# Patient Record
Sex: FEMALE | Race: ASIAN | NOT HISPANIC OR LATINO | Employment: FULL TIME | URBAN - METROPOLITAN AREA
[De-identification: names, ages, dates, MRNs, and addresses within clinical notes are randomized per-mention and may not be internally consistent; named-entity substitution may affect disease eponyms.]

---

## 2017-03-15 ENCOUNTER — OFFICE VISIT (OUTPATIENT)
Dept: URGENT CARE | Facility: PHYSICIAN GROUP | Age: 67
End: 2017-03-15
Payer: COMMERCIAL

## 2017-03-15 VITALS
DIASTOLIC BLOOD PRESSURE: 74 MMHG | HEART RATE: 82 BPM | SYSTOLIC BLOOD PRESSURE: 118 MMHG | OXYGEN SATURATION: 93 % | HEIGHT: 61 IN | TEMPERATURE: 98.8 F | RESPIRATION RATE: 16 BRPM | BODY MASS INDEX: 28.7 KG/M2 | WEIGHT: 152 LBS

## 2017-03-15 DIAGNOSIS — J06.9 VIRAL UPPER RESPIRATORY ILLNESS: ICD-10-CM

## 2017-03-15 LAB
INT CON NEG: NEGATIVE
INT CON POS: POSITIVE
S PYO AG THROAT QL: NEGATIVE

## 2017-03-15 PROCEDURE — 87880 STREP A ASSAY W/OPTIC: CPT | Performed by: FAMILY MEDICINE

## 2017-03-15 PROCEDURE — 99214 OFFICE O/P EST MOD 30 MIN: CPT | Performed by: FAMILY MEDICINE

## 2017-03-15 RX ORDER — BENZONATATE 200 MG/1
200 CAPSULE ORAL 3 TIMES DAILY PRN
Qty: 40 CAP | Refills: 0 | Status: SHIPPED | OUTPATIENT
Start: 2017-03-15 | End: 2017-12-06

## 2017-03-15 ASSESSMENT — ENCOUNTER SYMPTOMS
FEVER: 0
NAUSEA: 1
VOMITING: 0
ABDOMINAL PAIN: 0
HOARSE VOICE: 1
CHILLS: 0
SHORTNESS OF BREATH: 0
COUGH: 1
NECK PAIN: 0
DIZZINESS: 0
HEADACHES: 1

## 2017-03-15 NOTE — PROGRESS NOTES
Subjective:      Sue Can is a 66 y.o. female who presents with Sore Throat            Pharyngitis   This is a new problem. The current episode started 1 to 4 weeks ago (1 week). The problem has been gradually worsening. Neither side of throat is experiencing more pain than the other. There has been no fever. The pain is at a severity of 5/10. The pain is moderate. Associated symptoms include congestion, coughing, headaches and a hoarse voice. Pertinent negatives include no abdominal pain, plugged ear sensation, neck pain, shortness of breath or vomiting. Treatments tried: nyquil, robitussin. The treatment provided mild relief.       Review of Systems   Constitutional: Negative for fever and chills.   HENT: Positive for congestion and hoarse voice.    Respiratory: Positive for cough. Negative for shortness of breath.    Cardiovascular: Negative for chest pain.   Gastrointestinal: Positive for nausea. Negative for vomiting and abdominal pain.   Musculoskeletal: Negative for neck pain.   Neurological: Positive for headaches. Negative for dizziness.     PMH:  has a past medical history of Hyperlipidemia; Hypertension; GOUT; and Microscopic hematuria. She also has no past medical history of Breast cancer (CMS-[facility]).  MEDS:   Current outpatient prescriptions:   •  Pseudoephedrine-DM-GG (ROBITUSSIN COLD & COUGH PO), Take  by mouth., Disp: , Rfl:   •  Pseudoeph-Doxylamine-DM-APAP (NYQUIL PO), Take  by mouth., Disp: , Rfl:   •  losartan (COZAAR) 50 MG Tab, TAKE ONE TABLET BY MOUTH ONCE DAILY, Disp: 90 Tab, Rfl: 3  •  metoprolol SR (TOPROL XL) 25 MG TABLET SR 24 HR, TAKE ONE TABLET BY MOUTH ONCE DAILY, Disp: 90 Tab, Rfl: 3  •  atorvastatin (LIPITOR) 40 MG Tab, Take 1 Tab by mouth every evening., Disp: 90 Tab, Rfl: 1  •  allopurinol (ZYLOPRIM) 300 MG Tab, TAKE ONE TABLET BY MOUTH ONCE DAILY, Disp: 90 Tab, Rfl: 1  •  naproxen (NAPROSYN) 500 MG TABS, Take 1 Tab by mouth 2 times a day, with meals., Disp: 60 Tab, Rfl: 3  •   vitamin D, Ergocalciferol, (DRISDOL) 70268 UNITS CAPS capsule, Take 1 Cap by mouth every 7 days., Disp: 4 Cap, Rfl: 2  •  simvastatin (ZOCOR) 40 MG Tab, TAKE ONE TABLET BY MOUTH ONCE DAILY, Disp: 90 Tab, Rfl: 1  •  propranolol LA (INDERAL LA) 60 MG CAPSULE SR 24 HR, TAKE ONE CAPSULE BY MOUTH ONCE DAILY, Disp: 90 Cap, Rfl: 1  •  guaifenesin-codeine (CHERATUSSIN AC) SOLN, Take 5 mL by mouth every 6 hours as needed for Cough., Disp: 240 mL, Rfl: 0  •  losartan (COZAAR) 25 MG TABS, Take 1 Tab by mouth every day., Disp: 30 Tab, Rfl: 5  •  azithromycin (ZITHROMAX) 250 MG TABS, Use ATTILA as directed, Disp: 6 Tab, Rfl: 0  •  benzonatate (TESSALON) 100 MG CAPS, Take 1 Cap by mouth 3 times a day as needed for Cough., Disp: 30 Cap, Rfl: 0  •  KLOR-CON M20 20 MEQ TBCR, TAKE ONE TABLET BY MOUTH ONCE DAILY, Disp: 30 Tab, Rfl: 0  •  furosemide (LASIX) 20 MG TABS, TAKE ONE TABLET BY MOUTH ONCE DAILY, Disp: 30 Tab, Rfl: 0  •  promethazine-codeine (PHENERGAN-CODEINE) 6.25-10 MG/5ML SYRP, Take 5 mL by mouth 4 times a day as needed for Cough., Disp: 120 mL, Rfl: 0  •  cefdinir (OMNICEF) 300 MG CAPS, Take 1 Cap by mouth 2 times a day., Disp: 20 Cap, Rfl: 0  •  benzonatate (TESSALON) 100 MG CAPS, Take 1 Cap by mouth 3 times a day as needed for Cough., Disp: 30 Cap, Rfl: 0  •  lisinopril (PRINIVIL) 10 MG TABS, Take 1 Tab by mouth every day., Disp: 90 Tab, Rfl: 1  ALLERGIES:   Allergies   Allergen Reactions   • Lisinopril      cough     SURGHX:   Past Surgical History   Procedure Laterality Date   • Appendectomy     • Abdominal hysterectomy total       fibroid tumor   • Colonoscopy with polyp  11/10     benign polyp - repeat in 2020     SOCHX:  reports that she has never smoked. She has never used smokeless tobacco. She reports that she drinks alcohol. She reports that she does not use illicit drugs.  FH: Family history was reviewed, no pertinent findings to report       Objective:     /74 mmHg  Pulse 82  Temp(Src) 37.1 °C (98.8 °F)  " Resp 16  Ht 1.562 m (5' 1.5\")  Wt 68.947 kg (152 lb)  BMI 28.26 kg/m2  SpO2 93%     Physical Exam   Constitutional: She appears well-developed.   HENT:   Head: Normocephalic.   Right Ear: External ear normal.   Left Ear: External ear normal.   Mouth/Throat: Oropharyngeal exudate present.   Nasal congestion   Eyes: Pupils are equal, round, and reactive to light. Right eye exhibits no discharge. Left eye exhibits no discharge.   Neck: Neck supple. No thyromegaly present.   Cardiovascular: Normal rate.  Exam reveals no friction rub.    No murmur heard.  Pulmonary/Chest: Effort normal. No respiratory distress. She has no wheezes.   Abdominal: Soft. She exhibits no distension. There is no tenderness. There is no guarding.   Lymphadenopathy:     She has no cervical adenopathy.   Neurological: She is alert.   Skin: Skin is warm and dry. No erythema.   Psychiatric: She has a normal mood and affect. Her behavior is normal.               Assessment/Plan:     1. Viral upper respiratory illness  benzonatate (TESSALON) 200 MG capsule    mag hydrox-al hydrox-simeth-diphenhydrAMINE-lidocaine viscous 2%     Supportive care  Push fluids  Monitor temperature  Follow-up if symptoms worsen or fail to improve    "

## 2017-03-15 NOTE — MR AVS SNAPSHOT
"        Sue Can   3/15/2017 12:00 PM   Office Visit   MRN: [id number]    Department:  [facility]   Dept Phone:  [phone]    Description:  Female : 1950   Provider:  Hira Alvares M.D.           Reason for Visit     Sore Throat C/o cough, sore throat, runny nose x1 week      Allergies as of 3/15/2017     Allergen Noted Reactions    Lisinopril 2014       cough      You were diagnosed with     Viral upper respiratory illness   [[id number]]         Vital Signs     Blood Pressure Pulse Temperature Respirations Height Weight    118/74 mmHg 82 37.1 °C (98.8 °F) 16 1.562 m (5' 1.5\") 68.947 kg (152 lb)    Body Mass Index Oxygen Saturation Smoking Status             28.26 kg/m2 93% Never Smoker          Basic Information     Date Of Birth Sex Race Ethnicity Preferred Language    1950 Female  Non- English      Your appointments     2017  7:00 AM   Established Patient with Keeley Woodward M.D.   [facility] (--)    [address]  [address]  [facility] [id number] [phone]           You will be receiving a confirmation call a few days before your appointment from our automated call confirmation system.              Problem List              ICD-10-CM Priority Class Noted - Resolved    Gout M10.9   Unknown - Present    HTN (hypertension) I10   2013 - Present    Impaired fasting blood sugar R73.01   10/29/2013 - Present    Dyslipidemia E78.5   10/29/2013 - Present    Low TSH level R94.6   10/29/2013 - Present    Gout, arthropathy M10.9   10/29/2013 - Present    Type 2 diabetes mellitus without complication (CMS-HCC) E11.9   2015 - Present    Essential hypertension I10   2015 - Present      Health Maintenance        Date Due Completion Dates    DIABETES MONOFILAMENT / LE EXAM 1951 ---    IMM DTaP/Tdap/Td Vaccine (1 - Tdap) 1971 ---    RETINAL SCREENING 2015 (Done)    Override on 2014: Done    A1C SCREENING 2017 " 12/5/2016, 12/5/2016, 6/22/2016, 12/2/2015, 7/20/2015 (Done), 11/21/2014 (Done), 10/26/2013    Override on 7/20/2015: Done    Override on 11/21/2014: Done    FASTING LIPID PROFILE 12/5/2017 12/5/2016, 6/22/2016, 12/2/2015, 7/20/2015 (Done), 11/21/2014 (Done), 10/26/2013, 11/21/2011 (Done)    Override on 7/20/2015: Done    Override on 11/21/2014: Done    Override on 11/21/2011: Done    URINE ACR / MICROALBUMIN 12/5/2017 12/5/2016, 12/2/2015, 11/21/2014 (Done)    Override on 11/21/2014: Done    SERUM CREATININE 12/5/2017 12/5/2016, 6/22/2016, 12/2/2015, 7/20/2015 (Done), 11/21/2014 (Done), 10/26/2013    Override on 7/20/2015: Done    Override on 11/21/2014: Done    MAMMOGRAM 12/20/2017 12/20/2016, 11/25/2015, 10/31/2013, 10/30/2013, 2/9/2010 (Done)    Override on 2/9/2010: Done ([facility])    COLONOSCOPY 10/8/2022 10/8/2012 (Done)    Override on 10/8/2012: Done            Results     POCT Rapid Strep A      Component    Rapid Strep Screen    Negative    Internal Control Positive    Positive    Internal Control Negative    Negative                        Current Immunizations     13-VALENT PCV PREVNAR 12/14/2016    Influenza TIV (IM) 10/29/2013  4:36 PM    Influenza Vaccine Quad Inj (Pf) 12/2/2015    Influenza Vaccine Quad Inj (Preserved) 11/14/2016    Pneumococcal polysaccharide vaccine (PPSV-23) 8/11/2015    SHINGLES VACCINE 8/11/2015      Below and/or attached are the medications your provider expects you to take. Review all of your home medications and newly ordered medications with your provider and/or pharmacist. Follow medication instructions as directed by your provider and/or pharmacist. Please keep your medication list with you and share with your provider. Update the information when medications are discontinued, doses are changed, or new medications (including over-the-counter products) are added; and carry medication information at all times in the event of emergency situations     Allergies:   LISINOPRIL - (reactions not documented)               Medications  Valid as of: March 15, 2017 -  2:41 PM    Generic Name Brand Name Tablet Size Instructions for use    Allopurinol (Tab) ZYLOPRIM 300 MG TAKE ONE TABLET BY MOUTH ONCE DAILY        Atorvastatin Calcium (Tab) LIPITOR 40 MG Take 1 Tab by mouth every evening.        Azithromycin (Tab) ZITHROMAX 250 MG Use ATTILA as directed        Benzonatate (Cap) TESSALON 100 MG Take 1 Cap by mouth 3 times a day as needed for Cough.        Benzonatate (Cap) TESSALON 100 MG Take 1 Cap by mouth 3 times a day as needed for Cough.        Benzonatate (Cap) TESSALON 200 MG Take 1 Cap by mouth 3 times a day as needed for Cough.        Cefdinir (Cap) OMNICEF 300 MG Take 1 Cap by mouth 2 times a day.        Ergocalciferol (Cap) DRISDOL 80527 UNITS Take 1 Cap by mouth every 7 days.        Furosemide (Tab) LASIX 20 MG TAKE ONE TABLET BY MOUTH ONCE DAILY        Guaifenesin-Codeine (Solution) ROBITUSSIN -10 mg/5mL Take 5 mL by mouth every 6 hours as needed for Cough.        Lisinopril (Tab) PRINIVIL 10 MG Take 1 Tab by mouth every day.        Losartan Potassium (Tab) COZAAR 25 MG Take 1 Tab by mouth every day.        Losartan Potassium (Tab) COZAAR 50 MG TAKE ONE TABLET BY MOUTH ONCE DAILY        mag hydrox-al hydrox-simeth-diphenhydrAMINE-lidocaine viscous 2%   Take 30 mL by mouth 4 times a day as needed for up to 4 days.        Metoprolol Succinate (TABLET SR 24 HR) TOPROL XL 25 MG TAKE ONE TABLET BY MOUTH ONCE DAILY        Naproxen (Tab) NAPROSYN 500 MG Take 1 Tab by mouth 2 times a day, with meals.        Potassium Chloride Shanae CR (Tab CR) KLOR-CON M20 20 MEQ TAKE ONE TABLET BY MOUTH ONCE DAILY        Promethazine-Codeine (Syrup) PHENERGAN-CODEINE 6.25-10 MG/5ML Take 5 mL by mouth 4 times a day as needed for Cough.        Propranolol HCl (CAPSULE SR 24 HR) INDERAL LA 60 MG TAKE ONE CAPSULE BY MOUTH ONCE DAILY        Pseudoeph-Doxylamine-DM-APAP   Take  by mouth.         Pseudoephedrine-DM-GG   Take  by mouth.        Simvastatin (Tab) ZOCOR 40 MG TAKE ONE TABLET BY MOUTH ONCE DAILY        .                 Medicines prescribed today were sent to:     [facility] [address] [place] [address] - [address] [place] [address]    Phone: [phone] Fax: [phone]    Open 24 Hours?: No      Medication refill instructions:       If your prescription bottle indicates you have medication refills left, it is not necessary to call your provider’s office. Please contact your pharmacy and they will refill your medication.    If your prescription bottle indicates you do not have any refills left, you may request refills at any time through one of the following ways: The online [employer]ank system (except Urgent Care), by calling your provider’s office, or by asking your pharmacy to contact your provider’s office with a refill request. Medication refills are processed only during regular business hours and may not be available until the next business day. Your provider may request additional information or to have a follow-up visit with you prior to refilling your medication.   *Please Note: Medication refills are assigned a new Rx number when refilled electronically. Your pharmacy may indicate that no refills were authorized even though a new prescription for the same medication is available at the pharmacy. Please request the medicine by name with the pharmacy before contacting your provider for a refill.           Futubank Access Code: [email or web address]  Expires: 4/14/2017  2:41 PM    [employer]ank  A secure, online tool to manage your health information     [employer] Fut[employer]ank® is a secure, online tool that connects you to your personalized health information from the privacy of your home -- day or night - making it very easy for you to manage your healthcare. Once the activation process is completed, you can even access your medical information using the Futubank josh,  which is available for free in the Apple Serg store or Google Play store.     Tagwhat provides the following levels of access (as shown below):   My Chart Features   [facility] Primary [facility] Doctor [facility]  Specialists [facility]  Urgent  Care Non-[facility]  Primary Care  Doctor   Email your healthcare team securely and privately 24/7 X X X    Manage appointments: schedule your next appointment; view details of past/upcoming appointments X      Request prescription refills. X      View recent personal medical records, including lab and immunizations X X X X   View health record, including health history, allergies, medications X X X X   Read reports about your outpatient visits, procedures, consult and ER notes X X X X   See your discharge summary, which is a recap of your hospital and/or ER visit that includes your diagnosis, lab results, and care plan. X X       How to register for Tagwhat:  1. Go to  [email or web address].  2. Click on the Sign Up Now box, which takes you to the New Member Sign Up page. You will need to provide the following information:  a. Enter your Tagwhat Access Code exactly as it appears at the top of this page. (You will not need to use this code after you’ve completed the sign-up process. If you do not sign up before the expiration date, you must request a new code.)   b. Enter your date of birth.   c. Enter your home email address.   d. Click Submit, and follow the next screen’s instructions.  3. Create a Tagwhat ID. This will be your Tagwhat login ID and cannot be changed, so think of one that is secure and easy to remember.  4. Create a Tagwhat password. You can change your password at any time.  5. Enter your Password Reset Question and Answer. This can be used at a later time if you forget your password.   6. Enter your e-mail address. This allows you to receive e-mail notifications when new information is available in Tagwhat.  7. Click Sign Up. You can now view your health  information.    For assistance activating your [employer] account, call [phone]

## 2017-05-24 ENCOUNTER — HOSPITAL ENCOUNTER (OUTPATIENT)
Dept: LAB | Facility: MEDICAL CENTER | Age: 67
End: 2017-05-24
Attending: FAMILY MEDICINE
Payer: COMMERCIAL

## 2017-05-24 DIAGNOSIS — I10 ESSENTIAL HYPERTENSION: ICD-10-CM

## 2017-05-24 DIAGNOSIS — E11.9 TYPE 2 DIABETES MELLITUS WITHOUT COMPLICATION (HCC): ICD-10-CM

## 2017-05-24 DIAGNOSIS — R31.29 MICROSCOPIC HEMATURIA: ICD-10-CM

## 2017-05-24 DIAGNOSIS — E78.5 DYSLIPIDEMIA: ICD-10-CM

## 2017-05-24 DIAGNOSIS — M10.9 GOUT: ICD-10-CM

## 2017-05-24 LAB
25(OH)D3 SERPL-MCNC: 30 NG/ML (ref 30–100)
ALBUMIN SERPL BCP-MCNC: 3.7 G/DL (ref 3.2–4.9)
ALBUMIN/GLOB SERPL: 1 G/DL
ALP SERPL-CCNC: 60 U/L (ref 30–99)
ALT SERPL-CCNC: 18 U/L (ref 2–50)
ANION GAP SERPL CALC-SCNC: 7 MMOL/L (ref 0–11.9)
AST SERPL-CCNC: 21 U/L (ref 12–45)
BASOPHILS # BLD AUTO: 0.8 % (ref 0–1.8)
BASOPHILS # BLD: 0.05 K/UL (ref 0–0.12)
BILIRUB SERPL-MCNC: 0.9 MG/DL (ref 0.1–1.5)
BUN SERPL-MCNC: 16 MG/DL (ref 8–22)
CALCIUM SERPL-MCNC: 9.5 MG/DL (ref 8.5–10.5)
CHLORIDE SERPL-SCNC: 105 MMOL/L (ref 96–112)
CHOLEST SERPL-MCNC: 188 MG/DL (ref 100–199)
CO2 SERPL-SCNC: 26 MMOL/L (ref 20–33)
CREAT SERPL-MCNC: 0.59 MG/DL (ref 0.5–1.4)
CREAT UR-MCNC: 107.2 MG/DL
EOSINOPHIL # BLD AUTO: 0.18 K/UL (ref 0–0.51)
EOSINOPHIL NFR BLD: 3 % (ref 0–6.9)
ERYTHROCYTE [DISTWIDTH] IN BLOOD BY AUTOMATED COUNT: 41.7 FL (ref 35.9–50)
GFR SERPL CREATININE-BSD FRML MDRD: >60 ML/MIN/1.73 M 2
GLOBULIN SER CALC-MCNC: 3.8 G/DL (ref 1.9–3.5)
GLUCOSE SERPL-MCNC: 109 MG/DL (ref 65–99)
HCT VFR BLD AUTO: 47 % (ref 37–47)
HDLC SERPL-MCNC: 55 MG/DL
HGB BLD-MCNC: 14.8 G/DL (ref 12–16)
IMM GRANULOCYTES # BLD AUTO: 0.01 K/UL (ref 0–0.11)
IMM GRANULOCYTES NFR BLD AUTO: 0.2 % (ref 0–0.9)
LDLC SERPL CALC-MCNC: 90 MG/DL
LYMPHOCYTES # BLD AUTO: 2.84 K/UL (ref 1–4.8)
LYMPHOCYTES NFR BLD: 47.3 % (ref 22–41)
MCH RBC QN AUTO: 29 PG (ref 27–33)
MCHC RBC AUTO-ENTMCNC: 31.5 G/DL (ref 33.6–35)
MCV RBC AUTO: 92 FL (ref 81.4–97.8)
MICROALBUMIN UR-MCNC: 21.8 MG/DL
MICROALBUMIN/CREAT UR: 203 MG/G (ref 0–30)
MONOCYTES # BLD AUTO: 0.44 K/UL (ref 0–0.85)
MONOCYTES NFR BLD AUTO: 7.3 % (ref 0–13.4)
NEUTROPHILS # BLD AUTO: 2.49 K/UL (ref 2–7.15)
NEUTROPHILS NFR BLD: 41.4 % (ref 44–72)
NRBC # BLD AUTO: 0 K/UL
NRBC BLD AUTO-RTO: 0 /100 WBC
PLATELET # BLD AUTO: 244 K/UL (ref 164–446)
PMV BLD AUTO: 10 FL (ref 9–12.9)
POTASSIUM SERPL-SCNC: 3.6 MMOL/L (ref 3.6–5.5)
PROT SERPL-MCNC: 7.5 G/DL (ref 6–8.2)
RBC # BLD AUTO: 5.11 M/UL (ref 4.2–5.4)
SODIUM SERPL-SCNC: 138 MMOL/L (ref 135–145)
TRIGL SERPL-MCNC: 214 MG/DL (ref 0–149)
URATE SERPL-MCNC: 5.9 MG/DL (ref 1.9–8.2)
WBC # BLD AUTO: 6 K/UL (ref 4.8–10.8)

## 2017-05-24 PROCEDURE — 36415 COLL VENOUS BLD VENIPUNCTURE: CPT

## 2017-05-24 PROCEDURE — 85025 COMPLETE CBC W/AUTO DIFF WBC: CPT

## 2017-05-24 PROCEDURE — 82043 UR ALBUMIN QUANTITATIVE: CPT

## 2017-05-24 PROCEDURE — 80061 LIPID PANEL: CPT

## 2017-05-24 PROCEDURE — 84550 ASSAY OF BLOOD/URIC ACID: CPT

## 2017-05-24 PROCEDURE — 82306 VITAMIN D 25 HYDROXY: CPT

## 2017-05-24 PROCEDURE — 82570 ASSAY OF URINE CREATININE: CPT

## 2017-05-24 PROCEDURE — 83036 HEMOGLOBIN GLYCOSYLATED A1C: CPT

## 2017-05-24 PROCEDURE — 80053 COMPREHEN METABOLIC PANEL: CPT

## 2017-05-26 LAB
EST. AVERAGE GLUCOSE BLD GHB EST-MCNC: 146 MG/DL
HBA1C MFR BLD: 6.7 % (ref 0–5.6)

## 2017-06-01 ENCOUNTER — OFFICE VISIT (OUTPATIENT)
Dept: MEDICAL GROUP | Facility: PHYSICIAN GROUP | Age: 67
End: 2017-06-01
Payer: COMMERCIAL

## 2017-06-01 VITALS
DIASTOLIC BLOOD PRESSURE: 72 MMHG | RESPIRATION RATE: 16 BRPM | HEIGHT: 62 IN | WEIGHT: 160 LBS | SYSTOLIC BLOOD PRESSURE: 132 MMHG | HEART RATE: 94 BPM | TEMPERATURE: 98.6 F | OXYGEN SATURATION: 100 % | BODY MASS INDEX: 29.44 KG/M2

## 2017-06-01 DIAGNOSIS — I10 ESSENTIAL HYPERTENSION: ICD-10-CM

## 2017-06-01 DIAGNOSIS — M10.9 GOUT, ARTHROPATHY: ICD-10-CM

## 2017-06-01 DIAGNOSIS — Z23 NEED FOR TDAP VACCINATION: ICD-10-CM

## 2017-06-01 DIAGNOSIS — E78.5 DYSLIPIDEMIA: ICD-10-CM

## 2017-06-01 DIAGNOSIS — E11.9 TYPE 2 DIABETES MELLITUS WITHOUT COMPLICATION, WITHOUT LONG-TERM CURRENT USE OF INSULIN (HCC): ICD-10-CM

## 2017-06-01 PROCEDURE — 99214 OFFICE O/P EST MOD 30 MIN: CPT | Mod: 25 | Performed by: FAMILY MEDICINE

## 2017-06-01 PROCEDURE — 90471 IMMUNIZATION ADMIN: CPT | Performed by: FAMILY MEDICINE

## 2017-06-01 PROCEDURE — 90715 TDAP VACCINE 7 YRS/> IM: CPT | Performed by: FAMILY MEDICINE

## 2017-06-01 NOTE — PROGRESS NOTES
"Subjective:      Sue Can is a 66 y.o. female who presents with Follow-Up            HPI     Patient is here for follow-up of her medical problems. She is accompanied by her sister always comes to her appointments with her.    She continues to manage her diabetes mellitus type 2 with her own efforts. She however is not very good in watching her diet in terms of sweets and carbohydrates. She snacks on donuts and cookies at work. According to her sister she likes to eat bread at home.    For her hypertension, this is under control on losartan and metoprolol.    In terms of her dyslipidemia, we switched her from simvastatin to atorvastatin because the LDL was still not at target. Blood work was done before this visit. She is tolerating the new medication without any side effects.    She continues to take allopurinol for gout. She has not had any flareup in a long time.    She has some calluses in her feet which cause discomfort.    She needs tdap today.    Past medical history, past surgical history, family history reviewed-no changes    Social history reviewed-no changes    Allergies reviewed-no changes    Medications reviewed-no changes    ROS     Review of systems as per history of present illness, the rest are negative.       Objective:     /72 mmHg  Pulse 94  Temp(Src) 37 °C (98.6 °F)  Resp 16  Ht 1.575 m (5' 2\")  Wt 72.576 kg (160 lb)  BMI 29.26 kg/m2  SpO2 100%     Physical Exam     Examined alert, awake, oriented, not in distress    Neck-supple, no lymphadenopathy, no thyromegaly  Lungs-clear to auscultation, no rales, no wheezes  Heart-regular rate and rhythm, no murmur  Extremities-no edema, clubbing, cyanosis, thick callus plantar aspect of the fifth MTP joint bilaterally, strong pedal pulses, no open sores, wounds, maceration, monofilament test intact on areas of the feet tested       Hospital Outpatient Visit on 05/24/2017   Component Date Value   • Cholesterol,Tot 05/24/2017 188    • " Triglycerides 05/24/2017 214*previously 160    • HDL 05/24/2017 55 previously 55    • LDL 05/24/2017 90 previously 112    • Sodium 05/24/2017 138    • Potassium 05/24/2017 3.6    • Chloride 05/24/2017 105    • Co2 05/24/2017 26    • Anion Gap 05/24/2017 7.0    • Glucose 05/24/2017 109*   • Bun 05/24/2017 16    • Creatinine 05/24/2017 0.59    • Calcium 05/24/2017 9.5    • AST(SGOT) 05/24/2017 21    • ALT(SGPT) 05/24/2017 18    • Alkaline Phosphatase 05/24/2017 60    • Total Bilirubin 05/24/2017 0.9    • Albumin 05/24/2017 3.7    • Total Protein 05/24/2017 7.5    • Globulin 05/24/2017 3.8*   • A-G Ratio 05/24/2017 1.0    • Glycohemoglobin 05/24/2017 6.7*previously 6.5   • Est Avg Glucose 05/24/2017 146    • Creatinine, Urine 05/24/2017 107.20    • Microalbumin, Urine Rand* 05/24/2017 21.8    • Micro Alb Creat Ratio 05/24/2017 203*   • 25-Hydroxy   Vitamin D 25 05/24/2017 30    • WBC 05/24/2017 6.0    • RBC 05/24/2017 5.11    • Hemoglobin 05/24/2017 14.8    • Hematocrit 05/24/2017 47.0    • MCV 05/24/2017 92.0    • MCH 05/24/2017 29.0    • MCHC 05/24/2017 31.5*   • RDW 05/24/2017 41.7    • Platelet Count 05/24/2017 244    • MPV 05/24/2017 10.0    • Neutrophils-Polys 05/24/2017 41.40*   • Lymphocytes 05/24/2017 47.30*   • Monocytes 05/24/2017 7.30    • Eosinophils 05/24/2017 3.00    • Basophils 05/24/2017 0.80    • Immature Granulocytes 05/24/2017 0.20    • Nucleated RBC 05/24/2017 0.00    • Neutrophils (Absolute) 05/24/2017 2.49    • Lymphs (Absolute) 05/24/2017 2.84    • Monos (Absolute) 05/24/2017 0.44    • Eos (Absolute) 05/24/2017 0.18    • Baso (Absolute) 05/24/2017 0.05    • Immature Granulocytes (a* 05/24/2017 0.01    • NRBC (Absolute) 05/24/2017 0.00    • Uric Acid 05/24/2017 5.9    • GFR If  05/24/2017 >60    • GFR If Non  Ameri* 05/24/2017 >60       Assessment/Plan:     1. Type 2 diabetes mellitus without complication, without long-term current use of insulin (CMS-[facility])  Controlled with  her own efforts only. Advised work harder on diet with avoidance of the sweets and decreasing carbohydrates, regular exercises and weight loss. Advised to do yearly eye exam. Elevated microalbumin/creatinine ratio. She is already on losartan.  - LIPID PROFILE; Future  - COMP METABOLIC PANEL; Future  - HEMOGLOBIN A1C; Future  - Diabetic Monofilament Lower Extremity Exam    2. Essential hypertension  Controlled on her 2 medications.  - LIPID PROFILE; Future  - COMP METABOLIC PANEL; Future  - HEMOGLOBIN A1C; Future    3. Dyslipidemia  LDL improved and now down to goal. HDL at goal. Triglycerides increased and out of goal. Advised to avoid sweets and decreasing carbohydrates. Recheck blood work next visit.  - LIPID PROFILE; Future  - COMP METABOLIC PANEL; Future  - HEMOGLOBIN A1C; Future    4. Gout, arthropathy  Uric acid level is below 6.0. Continue allopurinol.  - LIPID PROFILE; Future  - COMP METABOLIC PANEL; Future  - HEMOGLOBIN A1C; Future    5. Need for Tdap vaccination  Tdap was given.  - TDAP VACCINE =>6YO IM        Please note that this dictation was created using voice recognition software. I have worked with consultants from the vendor as well as technical experts from [employer] to optimize the interface. I have made every reasonable attempt to correct obvious errors, but I expect that there are errors of grammar and possibly content I did not discover before finalizing the note.

## 2017-06-01 NOTE — MR AVS SNAPSHOT
"        Sue Millerney   2017 7:20 AM   Office Visit   MRN: [id number]    Department:  [facility]   Dept Phone:  [phone]    Description:  Female : 1950   Provider:  Keeley Woodward M.D.           Reason for Visit     Follow-Up 4 month follow up       Allergies as of 2017     Allergen Noted Reactions    Lisinopril 2014       cough      You were diagnosed with     Type 2 diabetes mellitus without complication, without long-term current use of insulin (CMS-[facility])   [4144156]       Essential hypertension   [4557778]       Dyslipidemia   [353942]       Gout, arthropathy   [230770]       Need for Tdap vaccination   [408494]         Vital Signs     Blood Pressure Pulse Temperature Respirations Height Weight    132/72 mmHg 94 37 °C (98.6 °F) 16 1.575 m (5' 2\") 72.576 kg (160 lb)    Body Mass Index Oxygen Saturation Smoking Status             29.26 kg/m2 100% Never Smoker          Basic Information     Date Of Birth Sex Race Ethnicity Preferred Language    1950 Female  Non- English      Your appointments     Dec 06, 2017  7:00 AM   Established Patient with Keeley Woodward M.D.   [facility] (--)    [address]  [address]  [facility] [address] [phone]           You will be receiving a confirmation call a few days before your appointment from our automated call confirmation system.              Problem List              ICD-10-CM Priority Class Noted - Resolved    Gout M10.9   Unknown - Present    HTN (hypertension) I10   2013 - Present    Impaired fasting blood sugar R73.01   10/29/2013 - Present    Dyslipidemia E78.5   10/29/2013 - Present    Low TSH level R94.6   10/29/2013 - Present    Gout, arthropathy M10.9   10/29/2013 - Present    Type 2 diabetes mellitus without complication (CMS-[facility]) E11.9   2015 - Present    Essential hypertension I10   2015 - Present      Health Maintenance        Date Due Completion Dates    DIABETES MONOFILAMENT / LE " EXAM 2/1/1951 ---    IMM DTaP/Tdap/Td Vaccine (1 - Tdap) 8/1/1971 ---    RETINAL SCREENING 11/1/2017 11/1/2016 (Done), 7/14/2014 (Done)    Override on 11/1/2016: Done    Override on 7/14/2014: Done    A1C SCREENING 11/24/2017 5/24/2017, 12/5/2016, 12/5/2016, 6/22/2016, 12/2/2015, 7/20/2015 (Done), 11/21/2014 (Done), 10/26/2013    Override on 7/20/2015: Done    Override on 11/21/2014: Done    MAMMOGRAM 12/20/2017 12/20/2016, 11/25/2015, 10/31/2013, 2/9/2010 (Done)    Override on 2/9/2010: Done ([facility])    FASTING LIPID PROFILE 5/24/2018 5/24/2017, 12/5/2016, 6/22/2016, 12/2/2015, 7/20/2015 (Done), 11/21/2014 (Done), 10/26/2013, 11/21/2011 (Done)    Override on 7/20/2015: Done    Override on 11/21/2014: Done    Override on 11/21/2011: Done    URINE ACR / MICROALBUMIN 5/24/2018 5/24/2017, 12/5/2016, 12/2/2015, 11/21/2014 (Done)    Override on 11/21/2014: Done    SERUM CREATININE 5/24/2018 5/24/2017, 12/5/2016, 6/22/2016, 12/2/2015, 7/20/2015 (Done), 11/21/2014 (Done), 10/26/2013    Override on 7/20/2015: Done    Override on 11/21/2014: Done    COLONOSCOPY 10/8/2022 10/8/2012 (Done)    Override on 10/8/2012: Done            Current Immunizations     13-VALENT PCV PREVNAR 12/14/2016    Influenza TIV (IM) 10/29/2013  4:36 PM    Influenza Vaccine Quad Inj (Pf) 12/2/2015    Influenza Vaccine Quad Inj (Preserved) 11/14/2016    Pneumococcal polysaccharide vaccine (PPSV-23) 8/11/2015    SHINGLES VACCINE 8/11/2015    Tdap Vaccine  Incomplete      Below and/or attached are the medications your provider expects you to take. Review all of your home medications and newly ordered medications with your provider and/or pharmacist. Follow medication instructions as directed by your provider and/or pharmacist. Please keep your medication list with you and share with your provider. Update the information when medications are discontinued, doses are changed, or new medications (including over-the-counter products) are  added; and carry medication information at all times in the event of emergency situations     Allergies:  LISINOPRIL - (reactions not documented)               Medications  Valid as of: June 01, 2017 -  7:45 AM    Generic Name Brand Name Tablet Size Instructions for use    Allopurinol (Tab) ZYLOPRIM 300 MG TAKE ONE TABLET BY MOUTH ONCE DAILY        Atorvastatin Calcium (Tab) LIPITOR 40 MG Take 1 Tab by mouth every evening.        Azithromycin (Tab) ZITHROMAX 250 MG Use ATTILA as directed        Benzonatate (Cap) TESSALON 100 MG Take 1 Cap by mouth 3 times a day as needed for Cough.        Benzonatate (Cap) TESSALON 100 MG Take 1 Cap by mouth 3 times a day as needed for Cough.        Benzonatate (Cap) TESSALON 200 MG Take 1 Cap by mouth 3 times a day as needed for Cough.        Cefdinir (Cap) OMNICEF 300 MG Take 1 Cap by mouth 2 times a day.        Ergocalciferol (Cap) DRISDOL 48865 UNITS Take 1 Cap by mouth every 7 days.        Furosemide (Tab) LASIX 20 MG TAKE ONE TABLET BY MOUTH ONCE DAILY        Guaifenesin-Codeine (Solution) ROBITUSSIN -10 mg/5mL Take 5 mL by mouth every 6 hours as needed for Cough.        Lisinopril (Tab) PRINIVIL 10 MG Take 1 Tab by mouth every day.        Losartan Potassium (Tab) COZAAR 25 MG Take 1 Tab by mouth every day.        Losartan Potassium (Tab) COZAAR 50 MG TAKE ONE TABLET BY MOUTH ONCE DAILY        Metoprolol Succinate (TABLET SR 24 HR) TOPROL XL 25 MG TAKE ONE TABLET BY MOUTH ONCE DAILY        Naproxen (Tab) NAPROSYN 500 MG Take 1 Tab by mouth 2 times a day, with meals.        Potassium Chloride Shanae CR (Tab CR) KLOR-CON M20 20 MEQ TAKE ONE TABLET BY MOUTH ONCE DAILY        Promethazine-Codeine (Syrup) PHENERGAN-CODEINE 6.25-10 MG/5ML Take 5 mL by mouth 4 times a day as needed for Cough.        Propranolol HCl (CAPSULE SR 24 HR) INDERAL LA 60 MG TAKE ONE CAPSULE BY MOUTH ONCE DAILY        Pseudoeph-Doxylamine-DM-APAP   Take  by mouth.        Pseudoephedrine-DM-GG   Take  by  mouth.        Simvastatin (Tab) ZOCOR 40 MG TAKE ONE TABLET BY MOUTH ONCE DAILY        .                 Medicines prescribed today were sent to:     [facility] [address] [place] [address] - [address] [place] [address]    Phone: [phone] Fax: [phone]    Open 24 Hours?: No      Medication refill instructions:       If your prescription bottle indicates you have medication refills left, it is not necessary to call your provider’s office. Please contact your pharmacy and they will refill your medication.    If your prescription bottle indicates you do not have any refills left, you may request refills at any time through one of the following ways: The online [employer] system (except Urgent Care), by calling your provider’s office, or by asking your pharmacy to contact your provider’s office with a refill request. Medication refills are processed only during regular business hours and may not be available until the next business day. Your provider may request additional information or to have a follow-up visit with you prior to refilling your medication.   *Please Note: Medication refills are assigned a new Rx number when refilled electronically. Your pharmacy may indicate that no refills were authorized even though a new prescription for the same medication is available at the pharmacy. Please request the medicine by name with the pharmacy before contacting your provider for a refill.        Your To Do List     Future Labs/Procedures Complete By Expires    COMP METABOLIC PANEL  As directed 6/2/2018    HEMOGLOBIN A1C  As directed 6/2/2018    LIPID PROFILE  As directed 6/2/2018      Instructions    Patient instructions given regarding labs, x-rays, medications, referral and followup appointment.          Dash [employer] Access Code: [email or web address]  Expires: 7/1/2017  7:45 AM    Dash Hudson  A secure, online tool to manage your health information     [employer]’s Dash Hudson® is a secure,  online tool that connects you to your personalized health information from the privacy of your home -- day or night - making it very easy for you to manage your healthcare. Once the activation process is completed, you can even access your medical information using the AudioSnaps serg, which is available for free in the Apple Serg store or Google Play store.     AudioSnaps provides the following levels of access (as shown below):   My Chart Features   [facility] Doctor [facility]  Specialists [facility]  Urgent  Care Non-[facility]  Primary Care  Doctor   Email your healthcare team securely and privately 24/7 X X X    Manage appointments: schedule your next appointment; view details of past/upcoming appointments X      Request prescription refills. X      View recent personal medical records, including lab and immunizations X X X X   View health record, including health history, allergies, medications X X X X   Read reports about your outpatient visits, procedures, consult and ER notes X X X X   See your discharge summary, which is a recap of your hospital and/or ER visit that includes your diagnosis, lab results, and care plan. X X       How to register for AudioSnaps:  1. Go to  [email or web address].  2. Click on the Sign Up Now box, which takes you to the New Member Sign Up page. You will need to provide the following information:  a. Enter your AudioSnaps Access Code exactly as it appears at the top of this page. (You will not need to use this code after you’ve completed the sign-up process. If you do not sign up before the expiration date, you must request a new code.)   b. Enter your date of birth.   c. Enter your home email address.   d. Click Submit, and follow the next screen’s instructions.  3. Create a AudioSnaps ID. This will be your AudioSnaps login ID and cannot be changed, so think of one that is secure and easy to remember.  4. Create a AudioSnaps password. You can change your password at any time.  5. Enter your  Password Reset Question and Answer. This can be used at a later time if you forget your password.   6. Enter your e-mail address. This allows you to receive e-mail notifications when new information is available in [employer].  7. Click Sign Up. You can now view your health information.    For assistance activating your [employer]st[employer] account, call [phone]

## 2017-06-19 RX ORDER — ALLOPURINOL 300 MG/1
TABLET ORAL
Qty: 90 TAB | Refills: 0 | Status: SHIPPED | OUTPATIENT
Start: 2017-06-19 | End: 2017-10-03 | Stop reason: SDUPTHER

## 2017-11-29 ENCOUNTER — HOSPITAL ENCOUNTER (OUTPATIENT)
Dept: LAB | Facility: MEDICAL CENTER | Age: 67
End: 2017-11-29
Attending: FAMILY MEDICINE
Payer: COMMERCIAL

## 2017-11-29 DIAGNOSIS — I10 ESSENTIAL HYPERTENSION: ICD-10-CM

## 2017-11-29 DIAGNOSIS — E11.9 TYPE 2 DIABETES MELLITUS WITHOUT COMPLICATION, WITHOUT LONG-TERM CURRENT USE OF INSULIN (HCC): ICD-10-CM

## 2017-11-29 DIAGNOSIS — E78.5 DYSLIPIDEMIA: ICD-10-CM

## 2017-11-29 DIAGNOSIS — M10.9 GOUT, ARTHROPATHY: ICD-10-CM

## 2017-11-29 LAB
ALBUMIN SERPL BCP-MCNC: 4.2 G/DL (ref 3.2–4.9)
ALBUMIN/GLOB SERPL: 1.3 G/DL
ALP SERPL-CCNC: 55 U/L (ref 30–99)
ALT SERPL-CCNC: 21 U/L (ref 2–50)
ANION GAP SERPL CALC-SCNC: 8 MMOL/L (ref 0–11.9)
AST SERPL-CCNC: 22 U/L (ref 12–45)
BILIRUB SERPL-MCNC: 0.8 MG/DL (ref 0.1–1.5)
BUN SERPL-MCNC: 11 MG/DL (ref 8–22)
CALCIUM SERPL-MCNC: 9.8 MG/DL (ref 8.5–10.5)
CHLORIDE SERPL-SCNC: 103 MMOL/L (ref 96–112)
CHOLEST SERPL-MCNC: 157 MG/DL (ref 100–199)
CO2 SERPL-SCNC: 25 MMOL/L (ref 20–33)
CREAT SERPL-MCNC: 0.53 MG/DL (ref 0.5–1.4)
GFR SERPL CREATININE-BSD FRML MDRD: >60 ML/MIN/1.73 M 2
GLOBULIN SER CALC-MCNC: 3.3 G/DL (ref 1.9–3.5)
GLUCOSE SERPL-MCNC: 89 MG/DL (ref 65–99)
HDLC SERPL-MCNC: 54 MG/DL
LDLC SERPL CALC-MCNC: 70 MG/DL
POTASSIUM SERPL-SCNC: 3.8 MMOL/L (ref 3.6–5.5)
PROT SERPL-MCNC: 7.5 G/DL (ref 6–8.2)
SODIUM SERPL-SCNC: 136 MMOL/L (ref 135–145)
TRIGL SERPL-MCNC: 167 MG/DL (ref 0–149)

## 2017-11-29 PROCEDURE — 83036 HEMOGLOBIN GLYCOSYLATED A1C: CPT

## 2017-11-29 PROCEDURE — 80061 LIPID PANEL: CPT

## 2017-11-29 PROCEDURE — 80053 COMPREHEN METABOLIC PANEL: CPT

## 2017-11-29 PROCEDURE — 36415 COLL VENOUS BLD VENIPUNCTURE: CPT

## 2017-11-30 LAB
EST. AVERAGE GLUCOSE BLD GHB EST-MCNC: 151 MG/DL
HBA1C MFR BLD: 6.9 % (ref 0–5.6)

## 2017-12-06 ENCOUNTER — OFFICE VISIT (OUTPATIENT)
Dept: MEDICAL GROUP | Facility: PHYSICIAN GROUP | Age: 67
End: 2017-12-06
Payer: COMMERCIAL

## 2017-12-06 VITALS
WEIGHT: 155.2 LBS | SYSTOLIC BLOOD PRESSURE: 126 MMHG | OXYGEN SATURATION: 90 % | HEART RATE: 98 BPM | TEMPERATURE: 99 F | BODY MASS INDEX: 28.56 KG/M2 | DIASTOLIC BLOOD PRESSURE: 80 MMHG | HEIGHT: 62 IN

## 2017-12-06 DIAGNOSIS — M10.9 GOUT, ARTHROPATHY: ICD-10-CM

## 2017-12-06 DIAGNOSIS — E78.5 DYSLIPIDEMIA: ICD-10-CM

## 2017-12-06 DIAGNOSIS — E55.9 VITAMIN D DEFICIENCY: ICD-10-CM

## 2017-12-06 DIAGNOSIS — E11.9 TYPE 2 DIABETES MELLITUS WITHOUT COMPLICATION, WITHOUT LONG-TERM CURRENT USE OF INSULIN (HCC): ICD-10-CM

## 2017-12-06 DIAGNOSIS — I10 ESSENTIAL HYPERTENSION: ICD-10-CM

## 2017-12-06 DIAGNOSIS — Z78.0 POSTMENOPAUSAL: ICD-10-CM

## 2017-12-06 PROCEDURE — 99214 OFFICE O/P EST MOD 30 MIN: CPT | Performed by: FAMILY MEDICINE

## 2017-12-06 ASSESSMENT — PATIENT HEALTH QUESTIONNAIRE - PHQ9: CLINICAL INTERPRETATION OF PHQ2 SCORE: 0

## 2017-12-06 NOTE — PROGRESS NOTES
"Subjective:      Sneha Ho is a 67 y.o. female who presents with Follow-Up            HPI     Patient returns for follow-up of her medical problems. She is accompanied by her sister always comes to her appointment. Her sister drives her to her appointments. Sister is asking for [facility] paperwork to be filled out so she can take time off to take her to her appointments and if she needs to go to urgent care for acute problems.    In terms of her diabetes mellitus type 2, she continues to manage this with her own efforts only. She has lost 5 pounds since last visit. She has been more careful in her diet.    She continues to take atorvastatin for dyslipidemia without myalgias.    For her hypertension, she continues to take losartan and metoprolol with good control of her blood pressure.    She continues to take allopurinol for gout. No recent gout attack.    She has been taking her vitamin D supplementation for vitamin D deficiency regularly.    She is scheduled to have her mammogram later this month. She will also have her eye exam later this month. She already had a flu shot in September at work. She is up-to-date with all the rest of her immunizations.    Past medical history, past surgical history, family history reviewed-no changes    Social history reviewed-no changes    Allergies reviewed-no changes    Medications reviewed-no changes    ROS     Review of systems as per history of present illness, the rest are negative.     Objective:     /80   Pulse 98   Temp 37.2 °C (99 °F)   Ht 1.575 m (5' 2\")   Wt 70.4 kg (155 lb 3.3 oz)   SpO2 90%   BMI 28.39 kg/m²      Physical Exam     Examined alert, awake, oriented, not in distress    Neck-supple, no lymphadenopathy, no thyromegaly  Lungs-clear to auscultation, no rales, no wheezes  Heart-regular rate and rhythm, no murmur  Extremities-no edema, clubbing, cyanosis        Results for SNEHA HO (MRN [id number]) as of 12/6/2017 07:23   Ref. Range " 5/24/2017 06:20 11/29/2017 06:56   Sodium Latest Ref Range: 135 - 145 mmol/L 138 136   Potassium Latest Ref Range: 3.6 - 5.5 mmol/L 3.6 3.8   Chloride Latest Ref Range: 96 - 112 mmol/L 105 103   Co2 Latest Ref Range: 20 - 33 mmol/L 26 25   Anion Gap Latest Ref Range: 0.0 - 11.9  7.0 8.0   Glucose Latest Ref Range: 65 - 99 mg/dL 109 (H) 89   Bun Latest Ref Range: 8 - 22 mg/dL 16 11   Creatinine Latest Ref Range: 0.50 - 1.40 mg/dL 0.59 0.53   GFR If  Latest Ref Range: >60 mL/min/1.73 m 2 >60 >60   GFR If Non  Latest Ref Range: >60 mL/min/1.73 m 2 >60 >60   Calcium Latest Ref Range: 8.5 - 10.5 mg/dL 9.5 9.8   AST(SGOT) Latest Ref Range: 12 - 45 U/L 21 22   ALT(SGPT) Latest Ref Range: 2 - 50 U/L 18 21   Alkaline Phosphatase Latest Ref Range: 30 - 99 U/L 60 55   Total Bilirubin Latest Ref Range: 0.1 - 1.5 mg/dL 0.9 0.8   Albumin Latest Ref Range: 3.2 - 4.9 g/dL 3.7 4.2   Total Protein Latest Ref Range: 6.0 - 8.2 g/dL 7.5 7.5   Globulin Latest Ref Range: 1.9 - 3.5 g/dL 3.8 (H) 3.3   A-G Ratio Latest Units: g/dL 1.0 1.3   Uric Acid Latest Ref Range: 1.9 - 8.2 mg/dL 5.9    Glycohemoglobin Latest Ref Range: 0.0 - 5.6 % 6.7 (H) 6.9 (H)   Estim. Avg Glu Latest Units: mg/dL 146 151   Cholesterol,Tot Latest Ref Range: 100 - 199 mg/dL 188 157   Triglycerides Latest Ref Range: 0 - 149 mg/dL 214 (H) 167 (H)   HDL Latest Ref Range: >=40 mg/dL 55 54   LDL Latest Ref Range: <100 mg/dL 90 70   Micro Alb Creat Ratio Latest Ref Range: 0 - 30 mg/g 203 (H)    Creatinine, Urine Latest Units: mg/dL 107.20    Microalbumin, Urine Random Latest Units: mg/dL 21.8      Assessment/Plan:     1. Type 2 diabetes mellitus without complication, without long-term current use of insulin (CMS-[facility])  This is still controlled with her own efforts. She will continue to watch her diet, exercise regularly and lose weight. Keep appointment for retinal exam.  - LIPID PROFILE; Future  - COMP METABOLIC PANEL; Future  - HEMOGLOBIN  A1C; Future  - CBC WITH DIFFERENTIAL; Future  - VITAMIN D,25 HYDROXY; Future  - MICROALBUMIN CREAT RATIO URINE; Future  - URIC ACID; Future    2. Dyslipidemia  Greatly improved triglycerides and they are now closer to goal. HDL and LDL at target. Continue atorvastatin.  - LIPID PROFILE; Future  - COMP METABOLIC PANEL; Future  - HEMOGLOBIN A1C; Future  - CBC WITH DIFFERENTIAL; Future  - VITAMIN D,25 HYDROXY; Future  - MICROALBUMIN CREAT RATIO URINE; Future  - URIC ACID; Future    3. Essential hypertension  Controlled on her medications.  - LIPID PROFILE; Future  - COMP METABOLIC PANEL; Future  - HEMOGLOBIN A1C; Future  - CBC WITH DIFFERENTIAL; Future  - VITAMIN D,25 HYDROXY; Future  - MICROALBUMIN CREAT RATIO URINE; Future  - URIC ACID; Future    4. Gout, arthropathy  Last uric acid level was 5.9 in May 2017. Continue allopurinol. No attacks of gout in a while. We will check another uric acid level next visit.  - LIPID PROFILE; Future  - COMP METABOLIC PANEL; Future  - HEMOGLOBIN A1C; Future  - CBC WITH DIFFERENTIAL; Future  - VITAMIN D,25 HYDROXY; Future  - MICROALBUMIN CREAT RATIO URINE; Future  - URIC ACID; Future    5. Vitamin D deficiency  Continue vitamin D supplementation. We will check vitamin D level next visit.  - LIPID PROFILE; Future  - COMP METABOLIC PANEL; Future  - HEMOGLOBIN A1C; Future  - CBC WITH DIFFERENTIAL; Future  - VITAMIN D,25 HYDROXY; Future  - MICROALBUMIN CREAT RATIO URINE; Future  - URIC ACID; Future    6. Postmenopausal  She will do her bone density scan for screening purposes.  - DS-BONE DENSITY STUDY (DEXA); Future      Please note that this dictation was created using voice recognition software. I have worked with consultants from the vendor as well as technical experts from [facility]  [employer] to optimize the interface. I have made every reasonable attempt to correct obvious errors, but I expect that there are errors of grammar and possibly content I did not discover before finalizing the  note.

## 2017-12-22 ENCOUNTER — APPOINTMENT (OUTPATIENT)
Dept: RADIOLOGY | Facility: IMAGING CENTER | Age: 67
End: 2017-12-22
Attending: NURSE PRACTITIONER
Payer: COMMERCIAL

## 2017-12-22 ENCOUNTER — OFFICE VISIT (OUTPATIENT)
Dept: URGENT CARE | Facility: PHYSICIAN GROUP | Age: 67
End: 2017-12-22
Payer: COMMERCIAL

## 2017-12-22 VITALS
HEART RATE: 119 BPM | TEMPERATURE: 102 F | HEIGHT: 62 IN | BODY MASS INDEX: 26.5 KG/M2 | DIASTOLIC BLOOD PRESSURE: 60 MMHG | WEIGHT: 144 LBS | OXYGEN SATURATION: 93 % | SYSTOLIC BLOOD PRESSURE: 128 MMHG

## 2017-12-22 DIAGNOSIS — J10.1 INFLUENZA A: ICD-10-CM

## 2017-12-22 DIAGNOSIS — R05.8 NONPRODUCTIVE COUGH: ICD-10-CM

## 2017-12-22 DIAGNOSIS — R50.9 FEVER, UNSPECIFIED FEVER CAUSE: ICD-10-CM

## 2017-12-22 DIAGNOSIS — R68.89 FLU-LIKE SYMPTOMS: ICD-10-CM

## 2017-12-22 LAB
FLUAV+FLUBV AG SPEC QL IA: NORMAL
INT CON NEG: NEGATIVE
INT CON POS: POSITIVE

## 2017-12-22 PROCEDURE — 94760 N-INVAS EAR/PLS OXIMETRY 1: CPT | Performed by: NURSE PRACTITIONER

## 2017-12-22 PROCEDURE — 94640 AIRWAY INHALATION TREATMENT: CPT | Performed by: NURSE PRACTITIONER

## 2017-12-22 PROCEDURE — 99214 OFFICE O/P EST MOD 30 MIN: CPT | Performed by: NURSE PRACTITIONER

## 2017-12-22 PROCEDURE — 71020 DX-CHEST-2 VIEWS: CPT | Mod: TC | Performed by: NURSE PRACTITIONER

## 2017-12-22 PROCEDURE — 87804 INFLUENZA ASSAY W/OPTIC: CPT | Performed by: NURSE PRACTITIONER

## 2017-12-22 RX ORDER — ALBUTEROL SULFATE 90 UG/1
2 AEROSOL, METERED RESPIRATORY (INHALATION) EVERY 6 HOURS PRN
Qty: 8.5 G | Refills: 0 | Status: SHIPPED | OUTPATIENT
Start: 2017-12-22 | End: 2019-10-29

## 2017-12-22 RX ORDER — ACETAMINOPHEN 500 MG
1000 TABLET ORAL ONCE
Status: COMPLETED | OUTPATIENT
Start: 2017-12-22 | End: 2017-12-22

## 2017-12-22 RX ORDER — IPRATROPIUM BROMIDE AND ALBUTEROL SULFATE 2.5; .5 MG/3ML; MG/3ML
3 SOLUTION RESPIRATORY (INHALATION) ONCE
Status: COMPLETED | OUTPATIENT
Start: 2017-12-22 | End: 2017-12-22

## 2017-12-22 RX ORDER — OSELTAMIVIR PHOSPHATE 75 MG/1
75 CAPSULE ORAL 2 TIMES DAILY
Qty: 10 CAP | Refills: 0 | Status: SHIPPED | OUTPATIENT
Start: 2017-12-22 | End: 2018-06-13

## 2017-12-22 RX ORDER — ACETAMINOPHEN 325 MG/1
650 TABLET ORAL EVERY 4 HOURS PRN
COMMUNITY
End: 2019-10-29

## 2017-12-22 RX ORDER — CODEINE PHOSPHATE AND GUAIFENESIN 10; 100 MG/5ML; MG/5ML
5 SOLUTION ORAL EVERY 6 HOURS PRN
Qty: 140 ML | Refills: 0 | Status: SHIPPED | OUTPATIENT
Start: 2017-12-22 | End: 2017-12-29

## 2017-12-22 RX ADMIN — Medication 1000 MG: at 19:22

## 2017-12-22 RX ADMIN — IPRATROPIUM BROMIDE AND ALBUTEROL SULFATE 3 ML: 2.5; .5 SOLUTION RESPIRATORY (INHALATION) at 19:23

## 2017-12-22 ASSESSMENT — ENCOUNTER SYMPTOMS
WEAKNESS: 0
SHORTNESS OF BREATH: 0
CONSTIPATION: 0
SORE THROAT: 1
NAUSEA: 0
DIZZINESS: 0
DIARRHEA: 0
ORTHOPNEA: 0
MYALGIAS: 1
VOMITING: 0
PALPITATIONS: 0
FEVER: 1
ABDOMINAL PAIN: 0
SINUS PAIN: 0
HEADACHES: 0
SPUTUM PRODUCTION: 0
EYE REDNESS: 0
EYE DISCHARGE: 0
CHILLS: 1
WHEEZING: 0
COUGH: 1

## 2017-12-22 NOTE — LETTER
December 22, 2017       Patient: Sue Can   YOB: 1950   Date of Visit: 12/22/2017         To Whom It May Concern:    It is my medical opinion that Sue Can be excused from work due to positive for Influenza A. May return on 12/25/17.    If you have any questions or concerns, please don't hesitate to call [phone]          Sincerely,          NALLELY Figueroa.N.P.  Electronically Signed

## 2017-12-22 NOTE — PROGRESS NOTES
Subjective:      Sue Can is a 67 y.o. female who presents with Cough (Sore throat, fever, headache x 3 days )            HPI  Fever 102 last night, taking Mucinex, works at [employer]. Nasal congestion- runny nose, PND. Admits to poor water drinking. Dry hacking cough without SOB, chest tightness or wheeze, has bronchospasing. Denies sore throat. Requesting work note. Sister/caretaker present. Sister states uncooperative with OTC treatment and water drinking.    PMH:  has a past medical history of GOUT; Hyperlipidemia; Hypertension; and Microscopic hematuria. She also has no past medical history of Breast cancer (CMS-HCC).  MEDS:   Current Outpatient Prescriptions:   •  acetaminophen (TYLENOL) 325 MG Tab, Take 650 mg by mouth every four hours as needed., Disp: , Rfl:   •  GuaiFENesin (MUCINEX PO), Take  by mouth., Disp: , Rfl:   •  oseltamivir (TAMIFLU) 75 MG Cap, Take 1 Cap by mouth 2 times a day., Disp: 10 Cap, Rfl: 0  •  guaifenesin-codeine (ROBITUSSIN AC) Solution oral solution, Take 5 mL by mouth every 6 hours as needed for Cough for up to 7 days. Causes drowsiness, do not drive or work while using, Disp: 140 mL, Rfl: 0  •  albuterol 108 (90 Base) MCG/ACT Aero Soln inhalation aerosol, Inhale 2 Puffs by mouth every 6 hours as needed for Shortness of Breath., Disp: 8.5 g, Rfl: 0  •  allopurinol (ZYLOPRIM) 300 MG Tab, TAKE ONE TABLET BY MOUTH ONCE DAILY, Disp: 90 Tab, Rfl: 1  •  atorvastatin (LIPITOR) 40 MG Tab, TAKE ONE TABLET BY MOUTH ONCE DAILY IN THE EVENING, Disp: 90 Tab, Rfl: 1  •  losartan (COZAAR) 50 MG Tab, TAKE ONE TABLET BY MOUTH ONCE DAILY, Disp: 90 Tab, Rfl: 3  •  metoprolol SR (TOPROL XL) 25 MG TABLET SR 24 HR, TAKE ONE TABLET BY MOUTH ONCE DAILY, Disp: 90 Tab, Rfl: 3  •  propranolol LA (INDERAL LA) 60 MG CAPSULE SR 24 HR, TAKE ONE CAPSULE BY MOUTH ONCE DAILY, Disp: 90 Cap, Rfl: 1  •  vitamin D, Ergocalciferol, (DRISDOL) 72801 UNITS CAPS capsule, Take 1 Cap by mouth every 7 days., Disp: 4  "Cap, Rfl: 2    Current Facility-Administered Medications:   •  ipratropium-albuterol (DUONEB) nebulizer solution 3 mL, 3 mL, Nebulization, Once, Bhumika Branch, F.N.P.  •  acetaminophen (TYLENOL) tablet 1,000 mg, 1,000 mg, Oral, Once, Bhumika Branch F.N.P.  ALLERGIES:   Allergies   Allergen Reactions   • Lisinopril      cough     SURGHX:   Past Surgical History:   Procedure Laterality Date   • COLONOSCOPY WITH POLYP  11/10    benign polyp - repeat in 2020   • ABDOMINAL HYSTERECTOMY TOTAL      fibroid tumor   • APPENDECTOMY       SOCHX:  reports that she has never smoked. She has never used smokeless tobacco. She reports that she drinks alcohol. She reports that she does not use drugs.  FH: Family history was reviewed, no pertinent findings to report    Review of Systems   Constitutional: Positive for chills, fever and malaise/fatigue.   HENT: Positive for congestion and sore throat. Negative for ear pain and sinus pain.    Eyes: Negative for discharge and redness.   Respiratory: Positive for cough. Negative for sputum production, shortness of breath and wheezing.    Cardiovascular: Negative for chest pain, palpitations and orthopnea.   Gastrointestinal: Negative for abdominal pain, constipation, diarrhea, nausea and vomiting.   Musculoskeletal: Positive for myalgias.   Neurological: Negative for dizziness, weakness and headaches.   All other systems reviewed and are negative.         Objective:     /60   Pulse (!) 119   Temp (!) 38.9 °C (102 °F)   Ht 1.575 m (5' 2\")   Wt 65.3 kg (144 lb)   SpO2 93%   BMI 26.34 kg/m²      Physical Exam   Constitutional: She appears well-developed and well-nourished. She is active and cooperative.  Non-toxic appearance. She does not have a sickly appearance. She appears ill. No distress.   HENT:   Head: Normocephalic.   Right Ear: Hearing, tympanic membrane, external ear and ear canal normal.   Left Ear: Hearing, tympanic membrane, external ear and ear canal " normal.   Nose: Mucosal edema and rhinorrhea present. No sinus tenderness.   Mouth/Throat: Uvula is midline. Mucous membranes are dry. No uvula swelling. Posterior oropharyngeal erythema present. No posterior oropharyngeal edema.   Eyes: Conjunctivae and EOM are normal. Pupils are equal, round, and reactive to light.   Neck: Normal range of motion.   Cardiovascular: Regular rhythm.  Tachycardia present.    Pulmonary/Chest: Effort normal and breath sounds normal. No accessory muscle usage. No respiratory distress. She has no decreased breath sounds. She has no wheezes. She has no rhonchi. She has no rales.   Musculoskeletal: Normal range of motion.   Lymphadenopathy:     She has no cervical adenopathy.   Neurological: She is alert.   Skin: Skin is warm and dry. She is not diaphoretic.   Vitals reviewed.            Duo neb breathing treatment: Patient has chest tightness, bronchospasm without wheeze, SOB or CP. Cough induced especially with deep breathing. After, patient states able to breathe deep without coughing. Air movement throughout. Post tx 95%.    CXRFINDINGS:  No pulmonary infiltrates or consolidations are noted.  No pleural effusions, no pneumothorax are appreciated.  Normal cardiopericardial silhouette.    [facility] Aware web site evaluation: I have obtained and reviewed patient utilization report from [facility] pharmacy database prior to writing prescription for controlled substance II, III or IV. Based on the report and my clinical assessment the prescription is medically necessary    Post Tylenol temp 99 degrees  Assessment/Plan:     1. Fever, unspecified fever cause    - POCT Influenza A/B  - acetaminophen (TYLENOL) tablet 1,000 mg; Take 2 Tabs by mouth Once.    2. Flu-like symptoms    - POCT Influenza A/B    3. Influenza A    - oseltamivir (TAMIFLU) 75 MG Cap; Take 1 Cap by mouth 2 times a day.  Dispense: 10 Cap; Refill: 0    4. Nonproductive cough    - guaifenesin-codeine (ROBITUSSIN AC) Solution  oral solution; Take 5 mL by mouth every 6 hours as needed for Cough for up to 7 days. Causes drowsiness, do not drive or work while using  Dispense: 140 mL; Refill: 0  - DX-CHEST-2 VIEWS; Future  - ipratropium-albuterol (DUONEB) nebulizer solution 3 mL; 3 mL by Nebulization route Once.  - albuterol 108 (90 Base) MCG/ACT Aero Soln inhalation aerosol; Inhale 2 Puffs by mouth every 6 hours as needed for Shortness of Breath.  Dispense: 8.5 g; Refill: 0    Increase water intake  May use Ibuprofen/Tylenol prn for fever or body aches  Get rest  May use saline nasal spray prn to flush nasal congestion  May use Mucinex as an expectorant prn  Use inhaler prn for SOB with cough  Monitor for fevers, productive cough, SOB, CP, chest tightness- need re-evaluation

## 2018-03-06 ENCOUNTER — HOSPITAL ENCOUNTER (OUTPATIENT)
Dept: RADIOLOGY | Facility: MEDICAL CENTER | Age: 68
End: 2018-03-06
Attending: FAMILY MEDICINE
Payer: COMMERCIAL

## 2018-03-06 DIAGNOSIS — Z78.0 POSTMENOPAUSAL: ICD-10-CM

## 2018-03-06 DIAGNOSIS — Z12.31 SCREENING MAMMOGRAM, ENCOUNTER FOR: ICD-10-CM

## 2018-03-06 PROCEDURE — 77067 SCR MAMMO BI INCL CAD: CPT

## 2018-03-06 PROCEDURE — 77080 DXA BONE DENSITY AXIAL: CPT

## 2018-05-02 ENCOUNTER — OFFICE VISIT (OUTPATIENT)
Dept: URGENT CARE | Facility: PHYSICIAN GROUP | Age: 68
End: 2018-05-02
Payer: COMMERCIAL

## 2018-05-02 VITALS
DIASTOLIC BLOOD PRESSURE: 80 MMHG | WEIGHT: 152 LBS | HEIGHT: 59 IN | SYSTOLIC BLOOD PRESSURE: 132 MMHG | TEMPERATURE: 98.4 F | BODY MASS INDEX: 30.64 KG/M2 | OXYGEN SATURATION: 95 % | HEART RATE: 104 BPM

## 2018-05-02 DIAGNOSIS — J98.01 ACUTE BRONCHOSPASM: ICD-10-CM

## 2018-05-02 DIAGNOSIS — R05.9 COUGH: ICD-10-CM

## 2018-05-02 DIAGNOSIS — J22 ACUTE LOWER RESPIRATORY INFECTION: Primary | ICD-10-CM

## 2018-05-02 PROCEDURE — 99214 OFFICE O/P EST MOD 30 MIN: CPT | Performed by: PHYSICIAN ASSISTANT

## 2018-05-02 RX ORDER — DOXYCYCLINE HYCLATE 100 MG
100 TABLET ORAL 2 TIMES DAILY
Qty: 20 TAB | Refills: 0 | Status: SHIPPED | OUTPATIENT
Start: 2018-05-02 | End: 2018-05-12

## 2018-05-02 RX ORDER — CODEINE PHOSPHATE/GUAIFENESIN 10-100MG/5
5 LIQUID (ML) ORAL 4 TIMES DAILY PRN
Qty: 100 ML | Refills: 0 | Status: SHIPPED | OUTPATIENT
Start: 2018-05-02 | End: 2018-05-07

## 2018-05-02 RX ORDER — ALBUTEROL SULFATE 90 UG/1
2 AEROSOL, METERED RESPIRATORY (INHALATION) 4 TIMES DAILY
Qty: 1 INHALER | Refills: 0 | Status: SHIPPED | OUTPATIENT
Start: 2018-05-02 | End: 2018-06-13

## 2018-05-02 ASSESSMENT — ENCOUNTER SYMPTOMS
NAUSEA: 0
SPUTUM PRODUCTION: 1
COUGH: 1
VOMITING: 0
SORE THROAT: 1
SHORTNESS OF BREATH: 0
HEADACHES: 1
STRIDOR: 0
RHINORRHEA: 1
WHEEZING: 1
FEVER: 1

## 2018-05-02 NOTE — PROGRESS NOTES
Subjective:      Sue Can is a 67 y.o. female who presents with Cough (Sinus congestion and sore throat x 1 wk )    PMH:  has a past medical history of GOUT; Hyperlipidemia; Hypertension; and Microscopic hematuria. She also has no past medical history of Breast cancer (HCC).  MEDS:   Current Outpatient Prescriptions:   •  doxycycline (VIBRAMYCIN) 100 MG Tab, Take 1 Tab by mouth 2 times a day for 10 days., Disp: 20 Tab, Rfl: 0  •  guaifenesin-codeine (TUSSI-ORGANIDIN NR) 100-10 MG/5ML syrup, Take 5 mL by mouth 4 times a day as needed for up to 5 days., Disp: 100 mL, Rfl: 0  •  albuterol 108 (90 Base) MCG/ACT Aero Soln inhalation aerosol, Inhale 2 Puffs by mouth 4 times a day., Disp: 1 Inhaler, Rfl: 0  •  allopurinol (ZYLOPRIM) 300 MG Tab, TAKE ONE TABLET BY MOUTH ONCE DAILY, Disp: 90 Tab, Rfl: 1  •  metoprolol SR (TOPROL XL) 25 MG TABLET SR 24 HR, TAKE ONE TABLET BY MOUTH ONCE DAILY, Disp: 90 Tab, Rfl: 1  •  atorvastatin (LIPITOR) 40 MG Tab, TAKE ONE TABLET BY MOUTH ONCE DAILY IN THE EVENING, Disp: 90 Tab, Rfl: 1  •  losartan (COZAAR) 50 MG Tab, TAKE ONE TABLET BY MOUTH ONCE DAILY, Disp: 90 Tab, Rfl: 1  •  acetaminophen (TYLENOL) 325 MG Tab, Take 650 mg by mouth every four hours as needed., Disp: , Rfl:   •  GuaiFENesin (MUCINEX PO), Take  by mouth., Disp: , Rfl:   •  oseltamivir (TAMIFLU) 75 MG Cap, Take 1 Cap by mouth 2 times a day., Disp: 10 Cap, Rfl: 0  •  albuterol 108 (90 Base) MCG/ACT Aero Soln inhalation aerosol, Inhale 2 Puffs by mouth every 6 hours as needed for Shortness of Breath., Disp: 8.5 g, Rfl: 0  •  propranolol LA (INDERAL LA) 60 MG CAPSULE SR 24 HR, TAKE ONE CAPSULE BY MOUTH ONCE DAILY, Disp: 90 Cap, Rfl: 1  •  vitamin D, Ergocalciferol, (DRISDOL) 41543 UNITS CAPS capsule, Take 1 Cap by mouth every 7 days., Disp: 4 Cap, Rfl: 2  ALLERGIES:   Allergies   Allergen Reactions   • Lisinopril      cough     SURGHX:   Past Surgical History:   Procedure Laterality Date   • COLONOSCOPY WITH POLYP  " 11/10    benign polyp - repeat in 2020   • ABDOMINAL HYSTERECTOMY TOTAL      fibroid tumor   • APPENDECTOMY       SOCHX:  reports that she has never smoked. She has never used smokeless tobacco. She reports that she drinks alcohol. She reports that she does not use drugs.  FH: Reviewed with patient/family. Not pertinent to this complaint.          Cough   This is a new problem. The current episode started 1 to 4 weeks ago. The problem has been gradually worsening. The problem occurs every few minutes. The cough is productive of sputum. Associated symptoms include a fever, headaches, nasal congestion, postnasal drip, rhinorrhea, a sore throat (when coughing) and wheezing. Pertinent negatives include no shortness of breath. The symptoms are aggravated by exercise and lying down. Risk factors for lung disease include smoking/tobacco exposure. She has tried body position changes, a beta-agonist inhaler, OTC cough suppressant and rest for the symptoms. The treatment provided no relief. Her past medical history is significant for bronchitis.       Review of Systems   Constitutional: Positive for fever.   HENT: Positive for congestion, postnasal drip, rhinorrhea and sore throat (when coughing).    Respiratory: Positive for cough, sputum production and wheezing. Negative for shortness of breath and stridor.    Gastrointestinal: Negative for nausea and vomiting.   Neurological: Positive for headaches.   All other systems reviewed and are negative.         Objective:     /80   Pulse (!) 104   Temp 36.9 °C (98.4 °F)   Ht 1.499 m (4' 11\")   Wt 68.9 kg (152 lb)   SpO2 95%   BMI 30.70 kg/m²      Physical Exam   Constitutional: She is oriented to person, place, and time. She appears well-developed and well-nourished. She is cooperative.  Non-toxic appearance. No distress.   HENT:   Head: Normocephalic and atraumatic.   Right Ear: Tympanic membrane normal.   Left Ear: Tympanic membrane normal.   Nose: Mucosal edema and " rhinorrhea present.   Mouth/Throat: Uvula is midline. Posterior oropharyngeal erythema present. No tonsillar exudate.   Eyes: Conjunctivae and EOM are normal. Pupils are equal, round, and reactive to light.   Neck: Normal range of motion. Neck supple.   Cardiovascular: Regular rhythm and normal heart sounds.  Tachycardia present.    Pulmonary/Chest: Effort normal. No respiratory distress. She has wheezes in the right middle field and the left middle field. She has rhonchi. She has no rales.   Abdominal: Soft. Bowel sounds are normal.   Musculoskeletal: Normal range of motion.   Neurological: She is alert and oriented to person, place, and time. Gait normal.   Skin: Skin is warm and dry. Capillary refill takes less than 2 seconds.   Psychiatric: She has a normal mood and affect.   Nursing note and vitals reviewed.         Assessment/Plan:     1. Acute lower respiratory infection  doxycycline (VIBRAMYCIN) 100 MG Tab    guaifenesin-codeine (TUSSI-ORGANIDIN NR) 100-10 MG/5ML syrup    albuterol 108 (90 Base) MCG/ACT Aero Soln inhalation aerosol   2. Cough  doxycycline (VIBRAMYCIN) 100 MG Tab    guaifenesin-codeine (TUSSI-ORGANIDIN NR) 100-10 MG/5ML syrup    albuterol 108 (90 Base) MCG/ACT Aero Soln inhalation aerosol   3. Acute bronchospasm  doxycycline (VIBRAMYCIN) 100 MG Tab    guaifenesin-codeine (TUSSI-ORGANIDIN NR) 100-10 MG/5ML syrup    albuterol 108 (90 Base) MCG/ACT Aero Soln inhalation aerosol     PT can continue OTC medications, increase fluids and rest until symptoms improve.      PT instructed not to drive or operate heavy machinery or drink alcohol while taking this medication because it contains either narcotic/benzodiazepines and causes drowsiness. PT verbalized understanding of these instructions.     [facility] Aware web site evaluation: I have obtained and reviewed patient utilization report from [facility] pharmacy database prior to writing prescription for controlled substance.  No history of abuse.       PT should follow up with PCP in 1-2 days for re-evaluation if symptoms have not improved.  Discussed red flags and reasons to return to UC or ED.  Pt and/or family verbalized understanding of diagnosis and follow up instructions and was offered informational handout on diagnosis.  PT discharged.

## 2018-05-02 NOTE — LETTER
May 2, 2018         Patient: Sue Can   YOB: 1950   Date of Visit: 5/2/2018           To Whom it May Concern:    Sue Can was seen in my clinic on 5/2/2018. She may return to work on 05/05/2018.     If you have any questions or concerns, please don't hesitate to call.        Sincerely,           Harmony Crabtree P.A.-C.  Electronically Signed

## 2018-06-05 ENCOUNTER — HOSPITAL ENCOUNTER (OUTPATIENT)
Dept: LAB | Facility: MEDICAL CENTER | Age: 68
End: 2018-06-05
Attending: FAMILY MEDICINE
Payer: COMMERCIAL

## 2018-06-05 DIAGNOSIS — M10.9 GOUT, ARTHROPATHY: ICD-10-CM

## 2018-06-05 DIAGNOSIS — I10 ESSENTIAL HYPERTENSION: ICD-10-CM

## 2018-06-05 DIAGNOSIS — E11.9 TYPE 2 DIABETES MELLITUS WITHOUT COMPLICATION, WITHOUT LONG-TERM CURRENT USE OF INSULIN (HCC): ICD-10-CM

## 2018-06-05 DIAGNOSIS — E55.9 VITAMIN D DEFICIENCY: ICD-10-CM

## 2018-06-05 DIAGNOSIS — E78.5 DYSLIPIDEMIA: ICD-10-CM

## 2018-06-05 LAB
25(OH)D3 SERPL-MCNC: 29 NG/ML (ref 30–100)
ALBUMIN SERPL BCP-MCNC: 4.2 G/DL (ref 3.2–4.9)
ALBUMIN/GLOB SERPL: 1.2 G/DL
ALP SERPL-CCNC: 52 U/L (ref 30–99)
ALT SERPL-CCNC: 24 U/L (ref 2–50)
ANION GAP SERPL CALC-SCNC: 8 MMOL/L (ref 0–11.9)
AST SERPL-CCNC: 20 U/L (ref 12–45)
BASOPHILS # BLD AUTO: 0.8 % (ref 0–1.8)
BASOPHILS # BLD: 0.05 K/UL (ref 0–0.12)
BILIRUB SERPL-MCNC: 0.7 MG/DL (ref 0.1–1.5)
BUN SERPL-MCNC: 13 MG/DL (ref 8–22)
CALCIUM SERPL-MCNC: 9.7 MG/DL (ref 8.5–10.5)
CHLORIDE SERPL-SCNC: 102 MMOL/L (ref 96–112)
CHOLEST SERPL-MCNC: 165 MG/DL (ref 100–199)
CO2 SERPL-SCNC: 26 MMOL/L (ref 20–33)
CREAT SERPL-MCNC: 0.55 MG/DL (ref 0.5–1.4)
CREAT UR-MCNC: 107 MG/DL
EOSINOPHIL # BLD AUTO: 0.15 K/UL (ref 0–0.51)
EOSINOPHIL NFR BLD: 2.3 % (ref 0–6.9)
ERYTHROCYTE [DISTWIDTH] IN BLOOD BY AUTOMATED COUNT: 43.5 FL (ref 35.9–50)
EST. AVERAGE GLUCOSE BLD GHB EST-MCNC: 146 MG/DL
GLOBULIN SER CALC-MCNC: 3.4 G/DL (ref 1.9–3.5)
GLUCOSE SERPL-MCNC: 103 MG/DL (ref 65–99)
HBA1C MFR BLD: 6.7 % (ref 0–5.6)
HCT VFR BLD AUTO: 44.7 % (ref 37–47)
HDLC SERPL-MCNC: 51 MG/DL
HGB BLD-MCNC: 14.2 G/DL (ref 12–16)
IMM GRANULOCYTES # BLD AUTO: 0.02 K/UL (ref 0–0.11)
IMM GRANULOCYTES NFR BLD AUTO: 0.3 % (ref 0–0.9)
LDLC SERPL CALC-MCNC: 81 MG/DL
LYMPHOCYTES # BLD AUTO: 3.08 K/UL (ref 1–4.8)
LYMPHOCYTES NFR BLD: 48 % (ref 22–41)
MCH RBC QN AUTO: 29.3 PG (ref 27–33)
MCHC RBC AUTO-ENTMCNC: 31.8 G/DL (ref 33.6–35)
MCV RBC AUTO: 92.2 FL (ref 81.4–97.8)
MICROALBUMIN UR-MCNC: 10.5 MG/DL
MICROALBUMIN/CREAT UR: 98 MG/G (ref 0–30)
MONOCYTES # BLD AUTO: 0.41 K/UL (ref 0–0.85)
MONOCYTES NFR BLD AUTO: 6.4 % (ref 0–13.4)
NEUTROPHILS # BLD AUTO: 2.71 K/UL (ref 2–7.15)
NEUTROPHILS NFR BLD: 42.2 % (ref 44–72)
NRBC # BLD AUTO: 0 K/UL
NRBC BLD-RTO: 0 /100 WBC
PLATELET # BLD AUTO: 219 K/UL (ref 164–446)
PMV BLD AUTO: 10.1 FL (ref 9–12.9)
POTASSIUM SERPL-SCNC: 3.8 MMOL/L (ref 3.6–5.5)
PROT SERPL-MCNC: 7.6 G/DL (ref 6–8.2)
RBC # BLD AUTO: 4.85 M/UL (ref 4.2–5.4)
SODIUM SERPL-SCNC: 136 MMOL/L (ref 135–145)
TRIGL SERPL-MCNC: 165 MG/DL (ref 0–149)
URATE SERPL-MCNC: 4.6 MG/DL (ref 1.9–8.2)
WBC # BLD AUTO: 6.4 K/UL (ref 4.8–10.8)

## 2018-06-05 PROCEDURE — 85025 COMPLETE CBC W/AUTO DIFF WBC: CPT

## 2018-06-05 PROCEDURE — 82570 ASSAY OF URINE CREATININE: CPT

## 2018-06-05 PROCEDURE — 82043 UR ALBUMIN QUANTITATIVE: CPT

## 2018-06-05 PROCEDURE — 80061 LIPID PANEL: CPT

## 2018-06-05 PROCEDURE — 83036 HEMOGLOBIN GLYCOSYLATED A1C: CPT

## 2018-06-05 PROCEDURE — 84550 ASSAY OF BLOOD/URIC ACID: CPT

## 2018-06-05 PROCEDURE — 82306 VITAMIN D 25 HYDROXY: CPT

## 2018-06-05 PROCEDURE — 80053 COMPREHEN METABOLIC PANEL: CPT

## 2018-06-05 PROCEDURE — 36415 COLL VENOUS BLD VENIPUNCTURE: CPT

## 2018-06-13 ENCOUNTER — OFFICE VISIT (OUTPATIENT)
Dept: MEDICAL GROUP | Facility: PHYSICIAN GROUP | Age: 68
End: 2018-06-13
Payer: COMMERCIAL

## 2018-06-13 VITALS
TEMPERATURE: 98.7 F | BODY MASS INDEX: 30.62 KG/M2 | HEART RATE: 109 BPM | HEIGHT: 59 IN | SYSTOLIC BLOOD PRESSURE: 120 MMHG | DIASTOLIC BLOOD PRESSURE: 60 MMHG | WEIGHT: 151.9 LBS | OXYGEN SATURATION: 97 %

## 2018-06-13 DIAGNOSIS — M85.89 OSTEOPENIA OF MULTIPLE SITES: ICD-10-CM

## 2018-06-13 DIAGNOSIS — I10 ESSENTIAL HYPERTENSION: ICD-10-CM

## 2018-06-13 DIAGNOSIS — E66.9 OBESITY (BMI 30-39.9): ICD-10-CM

## 2018-06-13 DIAGNOSIS — E11.9 TYPE 2 DIABETES MELLITUS WITHOUT COMPLICATION, WITHOUT LONG-TERM CURRENT USE OF INSULIN (HCC): ICD-10-CM

## 2018-06-13 DIAGNOSIS — E78.5 DYSLIPIDEMIA: ICD-10-CM

## 2018-06-13 DIAGNOSIS — M10.9 GOUT, ARTHRITIS: ICD-10-CM

## 2018-06-13 DIAGNOSIS — E55.9 VITAMIN D DEFICIENCY: ICD-10-CM

## 2018-06-13 PROCEDURE — 99214 OFFICE O/P EST MOD 30 MIN: CPT | Performed by: FAMILY MEDICINE

## 2018-06-13 RX ORDER — LOSARTAN POTASSIUM 50 MG/1
TABLET ORAL
Qty: 90 TAB | Refills: 1 | Status: SHIPPED | OUTPATIENT
Start: 2018-06-13 | End: 2019-12-31

## 2018-06-13 RX ORDER — METOPROLOL SUCCINATE 25 MG/1
TABLET, EXTENDED RELEASE ORAL
Qty: 90 TAB | Refills: 1 | Status: SHIPPED | OUTPATIENT
Start: 2018-06-13 | End: 2021-11-30

## 2018-06-13 RX ORDER — ATORVASTATIN CALCIUM 40 MG/1
TABLET, FILM COATED ORAL
Qty: 90 TAB | Refills: 1 | Status: SHIPPED | OUTPATIENT
Start: 2018-06-13 | End: 2019-10-29

## 2018-06-13 RX ORDER — ALLOPURINOL 300 MG/1
TABLET ORAL
Qty: 90 TAB | Refills: 1 | Status: SHIPPED | OUTPATIENT
Start: 2018-06-13 | End: 2019-06-03 | Stop reason: SDUPTHER

## 2018-06-13 NOTE — PROGRESS NOTES
"Subjective:      Sneha Ho is a 67 y.o. female who presents with Diabetes            HPI     Patient returns for follow-up for medical problems. She is accompanied by her sister who always comes to her appointments.    She continues to manage her diabetes mellitus type 2 with her own efforts only. Blood work was done before his visit. According to the sister patient has not been good in watching what she eats especially the carbs. Weight has increased since the last visit. Patient also has not been active or exercising regularly.    She continues to take atorvastatin for dyslipidemia. She denies any myalgias.    For her hypertension, this is under control on metoprolol and losartan. She denies any side effects.    For her gout, she continues to take allopurinol. She has not had any gout attacks in a while.    She takes vitamin D supplementation for vitamin D deficiency. She couldn't specify what dose. We sent her for bone density scan recently which came back osteopenia both lumbar spine and hip. She is on calcium and vitamin D supplementation.    Past medical history, past surgical history, family history reviewed-no changes    Social history reviewed-no changes    Allergies reviewed-no changes    Medications reviewed-no changes    ROS     As per history of present illness, the rest are negative.       Objective:     /60   Pulse (!) 109   Temp 37.1 °C (98.7 °F)   Ht 1.499 m (4' 11\")   Wt 68.9 kg (151 lb 14.4 oz)   SpO2 97%   BMI 30.68 kg/m²      Physical Exam     Examined alert, awake, oriented, not in distress    Neck-supple, no lymphadenopathy, no thyromegaly  Lungs-clear to auscultation, no rales, no wheezes  Heart-regular rate and rhythm, no murmur  Extremities-no edema, clubbing, cyanosis       Results for SNEHA HO (MRN [id number]) as of 6/13/2018 07:21   Ref. Range 5/24/2017 06:20 11/29/2017 06:56 6/5/2018 08:25 6/5/2018 08:26   Micro Alb Creat Ratio Latest Ref Range: 0 - 30 " mg/g 203 (H)  98 (H)    Creatinine, Urine Latest Units: mg/dL 107.20  107.00    Microalbumin, Urine Random Latest Units: mg/dL 21.8  10.5       Results for SNEHA HO (MRN [id number]) as of 6/13/2018 07:21   Ref. Range 11/29/2017 06:56 6/5/2018 08:25 6/5/2018 08:26   Sodium Latest Ref Range: 135 - 145 mmol/L 136  136   Potassium Latest Ref Range: 3.6 - 5.5 mmol/L 3.8  3.8   Chloride Latest Ref Range: 96 - 112 mmol/L 103  102   Co2 Latest Ref Range: 20 - 33 mmol/L 25  26   Anion Gap Latest Ref Range: 0.0 - 11.9  8.0  8.0   Glucose Latest Ref Range: 65 - 99 mg/dL 89  103 (H)   Bun Latest Ref Range: 8 - 22 mg/dL 11  13   Creatinine Latest Ref Range: 0.50 - 1.40 mg/dL 0.53  0.55   GFR If  Latest Ref Range: >60 mL/min/1.73 m 2 >60  >60   GFR If Non  Latest Ref Range: >60 mL/min/1.73 m 2 >60  >60   Calcium Latest Ref Range: 8.5 - 10.5 mg/dL 9.8  9.7   AST(SGOT) Latest Ref Range: 12 - 45 U/L 22  20   ALT(SGPT) Latest Ref Range: 2 - 50 U/L 21  24   Alkaline Phosphatase Latest Ref Range: 30 - 99 U/L 55  52   Total Bilirubin Latest Ref Range: 0.1 - 1.5 mg/dL 0.8  0.7   Albumin Latest Ref Range: 3.2 - 4.9 g/dL 4.2  4.2   Total Protein Latest Ref Range: 6.0 - 8.2 g/dL 7.5  7.6   Globulin Latest Ref Range: 1.9 - 3.5 g/dL 3.3  3.4   A-G Ratio Latest Units: g/dL 1.3  1.2   Uric Acid Latest Ref Range: 1.9 - 8.2 mg/dL   4.6   Glycohemoglobin Latest Ref Range: 0.0 - 5.6 % 6.9 (H)  6.7 (H)   Estim. Avg Glu Latest Units: mg/dL 151  146   Cholesterol,Tot Latest Ref Range: 100 - 199 mg/dL 157  165   Triglycerides Latest Ref Range: 0 - 149 mg/dL 167 (H)  165 (H)   HDL Latest Ref Range: >=40 mg/dL 54  51   LDL Latest Ref Range: <100 mg/dL 70  81   Results for SNEHA HO (MRN [id number]) as of 6/13/2018 07:21   Ref. Range 5/24/2017 06:20 6/5/2018 08:26   WBC Latest Ref Range: 4.8 - 10.8 K/uL 6.0 6.4   RBC Latest Ref Range: 4.20 - 5.40 M/uL 5.11 4.85   Hemoglobin Latest Ref Range: 12.0 - 16.0  g/dL 14.8 14.2   Hematocrit Latest Ref Range: 37.0 - 47.0 % 47.0 44.7   MCV Latest Ref Range: 81.4 - 97.8 fL 92.0 92.2   MCH Latest Ref Range: 27.0 - 33.0 pg 29.0 29.3   MCHC Latest Ref Range: 33.6 - 35.0 g/dL 31.5 (L) 31.8 (L)   RDW Latest Ref Range: 35.9 - 50.0 fL 41.7 43.5   Platelet Count Latest Ref Range: 164 - 446 K/uL 244 219   MPV Latest Ref Range: 9.0 - 12.9 fL 10.0 10.1   Neutrophils-Polys Latest Ref Range: 44.00 - 72.00 % 41.40 (L) 42.20 (L)   Neutrophils (Absolute) Latest Ref Range: 2.00 - 7.15 K/uL 2.49 2.71   Lymphocytes Latest Ref Range: 22.00 - 41.00 % 47.30 (H) 48.00 (H)   Lymphs (Absolute) Latest Ref Range: 1.00 - 4.80 K/uL 2.84 3.08   Monocytes Latest Ref Range: 0.00 - 13.40 % 7.30 6.40   Monos (Absolute) Latest Ref Range: 0.00 - 0.85 K/uL 0.44 0.41   Eosinophils Latest Ref Range: 0.00 - 6.90 % 3.00 2.30   Eos (Absolute) Latest Ref Range: 0.00 - 0.51 K/uL 0.18 0.15   Basophils Latest Ref Range: 0.00 - 1.80 % 0.80 0.80   Baso (Absolute) Latest Ref Range: 0.00 - 0.12 K/uL 0.05 0.05   Immature Granulocytes Latest Ref Range: 0.00 - 0.90 % 0.20 0.30   Immature Granulocytes (abs) Latest Ref Range: 0.00 - 0.11 K/uL 0.01 0.02   Nucleated RBC Latest Units: /100 WBC 0.00 0.00   NRBC (Absolute) Latest Units: K/uL 0.00 0.00   Results for SNEHA HO (MRN [id number]) as of 6/13/2018 07:21   Ref. Range 5/24/2017 06:20 6/5/2018 08:26   25-Hydroxy   Vitamin D 25 Latest Ref Range: 30 - 100 ng/mL 30 29 (L)     Assessment/Plan:     1. Type 2 diabetes mellitus without complication, without long-term current use of insulin (HCC)  This is under control without medication. Advised work harder on diet, exercise and weight loss.  - LIPID PROFILE; Future  - COMP METABOLIC PANEL; Future  - HEMOGLOBIN A1C; Future  - VITAMIN D,25 HYDROXY; Future    2. Dyslipidemia  All at target except triglycerides are still slightly high. Advised decreased the carbohydrates and avoid sweets. Advised regular exercises and weight loss.  Continue cholesterol medication.  - LIPID PROFILE; Future  - COMP METABOLIC PANEL; Future  - HEMOGLOBIN A1C; Future  - VITAMIN D,25 HYDROXY; Future  - atorvastatin (LIPITOR) 40 MG Tab; TAKE ONE TABLET BY MOUTH ONCE DAILY IN THE EVENING  Dispense: 90 Tab; Refill: 1    3. Essential hypertension  Controlled on her meds.  - LIPID PROFILE; Future  - COMP METABOLIC PANEL; Future  - HEMOGLOBIN A1C; Future  - VITAMIN D,25 HYDROXY; Future  - metoprolol SR (TOPROL XL) 25 MG TABLET SR 24 HR; TAKE ONE TABLET BY MOUTH ONCE DAILY  Dispense: 90 Tab; Refill: 1  - losartan (COZAAR) 50 MG Tab; TAKE ONE TABLET BY MOUTH ONCE DAILY  Dispense: 90 Tab; Refill: 1    4. Gout, arthritis  Uric acid is at target below 6.0. No attack of gout in a while. Continue allopurinol.  - allopurinol (ZYLOPRIM) 300 MG Tab; TAKE ONE TABLET BY MOUTH ONCE DAILY  Dispense: 90 Tab; Refill: 1    5. Vitamin D deficiency  Vitamin D dropped to 29. Advised to increase vitamin D supplementation by adding 1000 units more to her current dose. Recheck vitamin D level next visit.  - LIPID PROFILE; Future  - COMP METABOLIC PANEL; Future  - HEMOGLOBIN A1C; Future  - VITAMIN D,25 HYDROXY; Future    6. Osteopenia of multiple sites  Discussed weightbearing exercises. Discussed calcium and vitamin D supplementation regularly.  - LIPID PROFILE; Future  - COMP METABOLIC PANEL; Future  - HEMOGLOBIN A1C; Future  - VITAMIN D,25 HYDROXY; Future    7. Obesity (BMI 30-39.9)  Discussed diet, exercise and weight loss.  - Patient identified as having weight management issue.  Appropriate orders and counseling given.      Please note that this dictation was created using voice recognition software. I have worked with consultants from the vendor as well as technical experts from [employer] to optimize the interface. I have made every reasonable attempt to correct obvious errors, but I expect that there are errors of grammar and possibly content I did not discover before finalizing the  note.

## 2018-10-11 ENCOUNTER — OFFICE VISIT (OUTPATIENT)
Dept: MEDICAL GROUP | Facility: PHYSICIAN GROUP | Age: 68
End: 2018-10-11
Payer: COMMERCIAL

## 2018-10-11 VITALS
WEIGHT: 162.04 LBS | HEART RATE: 110 BPM | SYSTOLIC BLOOD PRESSURE: 160 MMHG | BODY MASS INDEX: 32.67 KG/M2 | TEMPERATURE: 99 F | DIASTOLIC BLOOD PRESSURE: 90 MMHG | OXYGEN SATURATION: 94 % | HEIGHT: 59 IN

## 2018-10-11 DIAGNOSIS — Z23 NEED FOR IMMUNIZATION AGAINST INFLUENZA: ICD-10-CM

## 2018-10-11 DIAGNOSIS — G89.29 CHRONIC PAIN OF BOTH KNEES: ICD-10-CM

## 2018-10-11 DIAGNOSIS — M25.562 CHRONIC PAIN OF BOTH KNEES: ICD-10-CM

## 2018-10-11 DIAGNOSIS — M25.561 CHRONIC PAIN OF BOTH KNEES: ICD-10-CM

## 2018-10-11 PROCEDURE — 99214 OFFICE O/P EST MOD 30 MIN: CPT | Mod: 25 | Performed by: FAMILY MEDICINE

## 2018-10-11 PROCEDURE — 90471 IMMUNIZATION ADMIN: CPT | Performed by: FAMILY MEDICINE

## 2018-10-11 PROCEDURE — 90662 IIV NO PRSV INCREASED AG IM: CPT | Performed by: FAMILY MEDICINE

## 2018-10-11 RX ORDER — MELOXICAM 15 MG/1
15 TABLET ORAL DAILY
Qty: 30 TAB | Refills: 1 | Status: SHIPPED | OUTPATIENT
Start: 2018-10-11 | End: 2021-11-30

## 2018-10-11 NOTE — LETTER
October 11, 2018         Patient: Sue Can   YOB: 1950   Date of Visit: 10/11/2018           To Whom it May Concern:    Sue Can was seen in my clinic on 10/11/2018. She has a learning disability which causes problems with understanding and following instructions.     Please allow patient's sister Noa Goodrich or brother in law Chan Goodrich to be her spokeperson and/or .    If you have any questions or concerns, please don't hesitate to call.        Sincerely,           Keeley Woodward M.D.  Electronically Signed

## 2018-10-13 NOTE — PROGRESS NOTES
"Subjective:      Sue Can is a 68 y.o. female who presents with Pain (left leg)            HPI     Patient is here accompanied by her sister always takes her to her appointments because of pain in both knees for the last 5 months.  Pain is noted when she is getting up from bed or from sitting position and when she is walking going up the stairs.  Takes Aleve on as-needed basis.    Sister is asking for a letter that she and her  (patient's brother-in-law) can act as patient's  or spokesperson on her behalf when she goes for interview when she processes her paperwork for Social Security benefits.  Patient has learning disability and has difficulty understanding instructions and filling out forms.    Past medical history, past surgical history, family history reviewed-no changes    Social history reviewed-no changes    Allergies reviewed-no changes    Medications reviewed-no changes        ROS     As per HPI.       Objective:     /90 (BP Location: Left arm, Patient Position: Sitting, BP Cuff Size: Adult)   Pulse (!) 110   Temp 37.2 °C (99 °F) (Temporal)   Ht 1.499 m (4' 11\")   Wt 73.5 kg (162 lb 0.6 oz)   SpO2 94%   BMI 32.73 kg/m²      Physical Exam     Examined alert, awake, oriented, not in distress    Neck-supple, no lymphadenopathy, no thyromegaly  Lungs-clear to auscultation, no rales, no wheezes  Heart-regular rate and rhythm, no murmur  Extremities-no edema, clubbing, cyanosis, knee exam-no effusion, no swelling, no redness, no point tenderness, full range of movement on passive flexion and extension without laxity or instability            Assessment/Plan:     1. Chronic pain of both knees  Most likely from arthritis probably degenerative arthritis/osteoarthritis.  We will get x-rays for evaluation.  Meloxicam 15 mg 1 tablet daily as needed with food.  Further recommendation will depend on results.  - DX-KNEE COMPLETE 4+ LEFT; Future  - DX-KNEE COMPLETE 4+ RIGHT; " Future  - meloxicam (MOBIC) 15 MG tablet; Take 1 Tab by mouth every day.  Dispense: 30 Tab; Refill: 1    2. Need for immunization against influenza  High-dose flu shot was given.  - INFLUENZA VACCINE, HIGH DOSE (65+ ONLY)    3.  Learning disability   Letter was written that sister and brother-in-law can be patient's  or spokesperson on her behalf to assist her with legal matters or paperwork.      Please note that this dictation was created using voice recognition software. I have worked with consultants from the vendor as well as technical experts from [facility] to optimize the interface. I have made every reasonable attempt to correct obvious errors, but I expect that there are errors of grammar and possibly content I did not discover before finalizing the note.

## 2018-10-24 ENCOUNTER — HOSPITAL ENCOUNTER (OUTPATIENT)
Dept: RADIOLOGY | Facility: MEDICAL CENTER | Age: 68
End: 2018-10-24
Attending: FAMILY MEDICINE
Payer: COMMERCIAL

## 2018-10-24 DIAGNOSIS — M25.562 CHRONIC PAIN OF BOTH KNEES: ICD-10-CM

## 2018-10-24 DIAGNOSIS — G89.29 CHRONIC PAIN OF BOTH KNEES: ICD-10-CM

## 2018-10-24 DIAGNOSIS — M25.561 CHRONIC PAIN OF BOTH KNEES: ICD-10-CM

## 2018-10-24 PROCEDURE — 73564 X-RAY EXAM KNEE 4 OR MORE: CPT | Mod: LT

## 2018-12-04 ENCOUNTER — HOSPITAL ENCOUNTER (OUTPATIENT)
Dept: LAB | Facility: MEDICAL CENTER | Age: 68
End: 2018-12-04
Attending: FAMILY MEDICINE
Payer: COMMERCIAL

## 2018-12-04 DIAGNOSIS — E78.5 DYSLIPIDEMIA: ICD-10-CM

## 2018-12-04 DIAGNOSIS — M85.89 OSTEOPENIA OF MULTIPLE SITES: ICD-10-CM

## 2018-12-04 DIAGNOSIS — I10 ESSENTIAL HYPERTENSION: ICD-10-CM

## 2018-12-04 DIAGNOSIS — E55.9 VITAMIN D DEFICIENCY: ICD-10-CM

## 2018-12-04 DIAGNOSIS — E11.9 TYPE 2 DIABETES MELLITUS WITHOUT COMPLICATION, WITHOUT LONG-TERM CURRENT USE OF INSULIN (HCC): ICD-10-CM

## 2018-12-04 LAB
25(OH)D3 SERPL-MCNC: 60 NG/ML (ref 30–100)
ALBUMIN SERPL BCP-MCNC: 4.4 G/DL (ref 3.2–4.9)
ALBUMIN/GLOB SERPL: 1.3 G/DL
ALP SERPL-CCNC: 65 U/L (ref 30–99)
ALT SERPL-CCNC: 20 U/L (ref 2–50)
ANION GAP SERPL CALC-SCNC: 8 MMOL/L (ref 0–11.9)
AST SERPL-CCNC: 21 U/L (ref 12–45)
BILIRUB SERPL-MCNC: 0.8 MG/DL (ref 0.1–1.5)
BUN SERPL-MCNC: 12 MG/DL (ref 8–22)
CALCIUM SERPL-MCNC: 9.8 MG/DL (ref 8.5–10.5)
CHLORIDE SERPL-SCNC: 105 MMOL/L (ref 96–112)
CHOLEST SERPL-MCNC: 185 MG/DL (ref 100–199)
CO2 SERPL-SCNC: 27 MMOL/L (ref 20–33)
CREAT SERPL-MCNC: 0.65 MG/DL (ref 0.5–1.4)
EST. AVERAGE GLUCOSE BLD GHB EST-MCNC: 154 MG/DL
FASTING STATUS PATIENT QL REPORTED: NORMAL
GLOBULIN SER CALC-MCNC: 3.5 G/DL (ref 1.9–3.5)
GLUCOSE SERPL-MCNC: 97 MG/DL (ref 65–99)
HBA1C MFR BLD: 7 % (ref 0–5.6)
HDLC SERPL-MCNC: 55 MG/DL
LDLC SERPL CALC-MCNC: 101 MG/DL
POTASSIUM SERPL-SCNC: 3.9 MMOL/L (ref 3.6–5.5)
PROT SERPL-MCNC: 7.9 G/DL (ref 6–8.2)
SODIUM SERPL-SCNC: 140 MMOL/L (ref 135–145)
TRIGL SERPL-MCNC: 146 MG/DL (ref 0–149)

## 2018-12-04 PROCEDURE — 36415 COLL VENOUS BLD VENIPUNCTURE: CPT

## 2018-12-04 PROCEDURE — 82306 VITAMIN D 25 HYDROXY: CPT

## 2018-12-04 PROCEDURE — 83036 HEMOGLOBIN GLYCOSYLATED A1C: CPT

## 2018-12-04 PROCEDURE — 80053 COMPREHEN METABOLIC PANEL: CPT

## 2018-12-04 PROCEDURE — 80061 LIPID PANEL: CPT

## 2018-12-11 ENCOUNTER — OFFICE VISIT (OUTPATIENT)
Dept: MEDICAL GROUP | Facility: PHYSICIAN GROUP | Age: 68
End: 2018-12-11
Payer: COMMERCIAL

## 2018-12-11 VITALS
SYSTOLIC BLOOD PRESSURE: 124 MMHG | WEIGHT: 153.88 LBS | BODY MASS INDEX: 31.02 KG/M2 | DIASTOLIC BLOOD PRESSURE: 80 MMHG | TEMPERATURE: 98.8 F | OXYGEN SATURATION: 95 % | HEART RATE: 114 BPM | HEIGHT: 59 IN

## 2018-12-11 DIAGNOSIS — M10.9 GOUT, ARTHROPATHY: ICD-10-CM

## 2018-12-11 DIAGNOSIS — E78.5 DYSLIPIDEMIA: ICD-10-CM

## 2018-12-11 DIAGNOSIS — M17.0 BILATERAL PRIMARY OSTEOARTHRITIS OF KNEE: ICD-10-CM

## 2018-12-11 DIAGNOSIS — M25.471 RIGHT ANKLE SWELLING: ICD-10-CM

## 2018-12-11 DIAGNOSIS — E11.9 TYPE 2 DIABETES MELLITUS WITHOUT COMPLICATION, WITHOUT LONG-TERM CURRENT USE OF INSULIN (HCC): ICD-10-CM

## 2018-12-11 DIAGNOSIS — I10 ESSENTIAL HYPERTENSION: ICD-10-CM

## 2018-12-11 PROCEDURE — 99214 OFFICE O/P EST MOD 30 MIN: CPT | Performed by: FAMILY MEDICINE

## 2018-12-11 ASSESSMENT — PATIENT HEALTH QUESTIONNAIRE - PHQ9: CLINICAL INTERPRETATION OF PHQ2 SCORE: 0

## 2018-12-11 NOTE — PROGRESS NOTES
"Subjective:      Sue Can is a 68 y.o. female who presents with Diabetes        HPI:  The patient presents today for follow up after receiving ordered Xrays for bilateral knee pain 2 months. Radiology results showed osteoarthritis with the right knee being worse than the left. She adds the pain has improved since her last visit. She has been taking her Meloxicam which alleviates her pain.  She also uses over-the-counter liniment.  Massage also works.  She was referred to an orthopedist, but she would not like to go.     She also states she has been having right ankle pain and sweling for the past 2 weeks. She notes the pain is exacerbated by walking on it. Denies any recent injury or twisting of her ankle.    She is also here for regular follow up of other medical problems. She does not take medications for her  Diabetes and controls it at home with her diet. She adds she has lost 10 lbs since her last visit due to an improved diet.     She continues to take her medications for hypertension which are metoprolol and losartan.  Blood pressure is now down to goal compared to previous visits.    For the dyslipidemia, she continues to take atorvastatin without myalgias.    She takes allopurinol for gout.  She has not had attack of gout in a while.  The last uric acid level was 4.6 in June 2018.        Past medical history, past surgical history, family history reviewed-no changes    Social history reviewed-no changes    Allergies reviewed-no changes    Medications reviewed-no changes      ROS:  As per the HPI as shown above, the rest are negative.       Objective:     /80 (BP Location: Left arm, Patient Position: Sitting, BP Cuff Size: Adult)   Pulse (!) 114   Temp 37.1 °C (98.8 °F) (Temporal)   Ht 1.499 m (4' 11\")   Wt 69.8 kg (153 lb 14.1 oz)   SpO2 95%   BMI 31.08 kg/m²     Physical Exam   Examined alert, awake, oriented, not in distress    Neck-supple, no lymphadenopathy, no " thyromegaly  Lungs-clear to auscultation, no rales, no wheezes  Heart-regular rate and rhythm, no murmur  Extremities-there is swelling of the area around the right lateral malleolus with mild tenderness, no warmth, no redness, full range of movement of the right foot  Monofilament testing with a 10 gram force: sensation intact: intact bilaterally  Visual Inspection: Feet without maceration, ulcers, fissures.  Pedal pulses: intact bilaterally  No open sores wounds or ulcers.       Labs:  No visits with results within 1 Week(s) from this visit.   Latest known visit with results is:   Hospital Outpatient Visit on 12/04/2018   Component Date Value Ref Range Status   • Cholesterol,Tot 12/04/2018 185 previously 165 100 - 199 mg/dL Final   • Triglycerides 12/04/2018 146 previously 165 0 - 149 mg/dL Final   • HDL 12/04/2018 55 previously 51 >=40 mg/dL Final   • LDL 12/04/2018 101*previously 81 <100 mg/dL Final   • Sodium 12/04/2018 140  135 - 145 mmol/L Final   • Potassium 12/04/2018 3.9  3.6 - 5.5 mmol/L Final   • Chloride 12/04/2018 105  96 - 112 mmol/L Final   • Co2 12/04/2018 27  20 - 33 mmol/L Final   • Anion Gap 12/04/2018 8.0  0.0 - 11.9 Final   • Glucose 12/04/2018 97  65 - 99 mg/dL Final   • Bun 12/04/2018 12  8 - 22 mg/dL Final   • Creatinine 12/04/2018 0.65  0.50 - 1.40 mg/dL Final   • Calcium 12/04/2018 9.8  8.5 - 10.5 mg/dL Final   • AST(SGOT) 12/04/2018 21  12 - 45 U/L Final   • ALT(SGPT) 12/04/2018 20  2 - 50 U/L Final   • Alkaline Phosphatase 12/04/2018 65  30 - 99 U/L Final   • Total Bilirubin 12/04/2018 0.8  0.1 - 1.5 mg/dL Final   • Albumin 12/04/2018 4.4  3.2 - 4.9 g/dL Final   • Total Protein 12/04/2018 7.9  6.0 - 8.2 g/dL Final   • Globulin 12/04/2018 3.5  1.9 - 3.5 g/dL Final   • A-G Ratio 12/04/2018 1.3  g/dL Final   • Glycohemoglobin 12/04/2018 7.0*previously 6.7 0.0 - 5.6 % Final    Comment: Increased risk for diabetes:  5.7 -6.4%  Diabetes:  >6.4%  Glycemic control for adults with diabetes:   <7.0%  The above interpretations are per ADA guidelines.  Diagnosis  of diabetes mellitus on the basis of elevated Hemoglobin A1c  should be confirmed by repeating the Hb A1c test.     • Est Avg Glucose 12/04/2018 154  mg/dL Final    Comment: The eAG calculation is based on the A1c-Derived Daily Glucose  (ADAG) study.  See the ADA's website for additional information.     • 25-Hydroxy   Vitamin D 25 12/04/2018 60  30 - 100 ng/mL Final    Comment: Adult Ranges:   <20 ng/mL - Deficiency  20-29 ng/mL - Insufficiency   ng/mL - Sufficiency  The [employer] Centaur Vitamin D Assay is standardized to the  [facility] reference measurement procedures, a  reference method for the Vitamin D Standardization Program  (VDSP).  The VDSP aligns patient results among 25 (OH)  Vitamin D methods.     • Fasting Status 12/04/2018 Fasting   Final   • GFR If  12/04/2018 >60  >60 mL/min/1.73 m 2 Final   • GFR If Non  12/04/2018 >60  >60 mL/min/1.73 m 2 Final              Assessment/Plan:   1. Type 2 diabetes mellitus without complication, without long-term current use of insulin (HCC)  Hemoglobin A1c increased from 6.7-7.0.  This is at the upper limit of goal.  Advised to work harder on diet, exercise and weight loss.  If hemoglobin A1c goes up higher than 7.0 we may have to start her on medication.  Recheck blood work in 4 months.  - Lipid Profile; Future  - COMP METABOLIC PANEL; Future  - HEMOGLOBIN A1C; Future  - MICROALBUMIN CREAT RATIO URINE; Future  - URIC ACID; Future    2. Essential hypertension  Patient's blood pressure has improved with recent weight loss (secondary to improved diet) and compliance with Metoprolol and Losartan.  - Lipid Profile; Future  - COMP METABOLIC PANEL; Future  - HEMOGLOBIN A1C; Future  - MICROALBUMIN CREAT RATIO URINE; Future  - URIC ACID; Future    3. Dyslipidemia  LDL increased from .  The goal is below 100.  I counseled her on her fat consumption and overall  diet. We will be repeating lab tests to follow-up on these levels.  Continue current dose of atorvastatin.  - Lipid Profile; Future  - COMP METABOLIC PANEL; Future  - HEMOGLOBIN A1C; Future  - MICROALBUMIN CREAT RATIO URINE; Future  - URIC ACID; Future    4. Gout, arthropathy  Patient's gout has been well-managed with compliance of Allopurinol.  We will check uric acid next visit.  - Lipid Profile; Future  - COMP METABOLIC PANEL; Future  - HEMOGLOBIN A1C; Future  - MICROALBUMIN CREAT RATIO URINE; Future  - URIC ACID; Future    5. Bilateral primary osteoarthritis of knee  Patient's radiology results show osteoarthritis in bilateral knees with right worse than left. Patient would not like to follow up with orthopedics at this time.  Continue meloxicam, over-the-counter liniment and massage.    6. Right ankle swelling  The patient has been ordered an ankle xray. Based on physical exam, I have low suspicion for presentation of gout or an ankle fracture.  This may be osteoarthritis.  May take meloxicam as needed for the pain and swelling.  Further recommendation will depend on results.  - DX-ANKLE 2- VIEWS RIGHT; Future          Conner CAMARA (Scribe), am scribing for, and in the presence of, Keeley Woodward MD     Electronically signed by: Conner Pappas (Scribe), 12/11/2018    IKeeley MD personally performed the services described in this documentation, as scribed by Conner Pappas in my presence, and it is both accurate and complete.

## 2018-12-12 ENCOUNTER — HOSPITAL ENCOUNTER (OUTPATIENT)
Dept: RADIOLOGY | Facility: MEDICAL CENTER | Age: 68
End: 2018-12-12
Attending: FAMILY MEDICINE
Payer: COMMERCIAL

## 2018-12-12 DIAGNOSIS — M25.471 RIGHT ANKLE SWELLING: ICD-10-CM

## 2018-12-12 PROCEDURE — 73600 X-RAY EXAM OF ANKLE: CPT | Mod: RT

## 2018-12-13 ENCOUNTER — TELEPHONE (OUTPATIENT)
Dept: MEDICAL GROUP | Facility: PHYSICIAN GROUP | Age: 68
End: 2018-12-13

## 2018-12-13 NOTE — TELEPHONE ENCOUNTER
1. Caller Name: Sue Luo Angelaney                                         Call Back Number: [phone] (home)      Left voicemail to go over xrays

## 2018-12-13 NOTE — TELEPHONE ENCOUNTER
----- Message from Keeley Woodward M.D. sent at 12/12/2018  6:57 PM PST -----  X-ray of the ankle did not show any fracture or dislocation.  It showed soft tissue swelling which may be from twisting the ankle/ankle sprain.  Continue taking meloxicam for pain and inflammation.  May do warm compresses with warm washcloth or heating pad 15 minutes 3 times a day.  Elevate the right foot at the end of the workday when she is already at home.

## 2019-04-16 ENCOUNTER — APPOINTMENT (OUTPATIENT)
Dept: MEDICAL GROUP | Facility: PHYSICIAN GROUP | Age: 69
End: 2019-04-16
Payer: COMMERCIAL

## 2019-07-30 ENCOUNTER — OFFICE VISIT (OUTPATIENT)
Dept: URGENT CARE | Facility: PHYSICIAN GROUP | Age: 69
End: 2019-07-30
Payer: COMMERCIAL

## 2019-07-30 VITALS
HEIGHT: 59 IN | TEMPERATURE: 98.4 F | HEART RATE: 84 BPM | SYSTOLIC BLOOD PRESSURE: 130 MMHG | OXYGEN SATURATION: 98 % | BODY MASS INDEX: 30.84 KG/M2 | WEIGHT: 153 LBS | DIASTOLIC BLOOD PRESSURE: 84 MMHG

## 2019-07-30 DIAGNOSIS — L25.3 CHEMICAL DERMATITIS: ICD-10-CM

## 2019-07-30 PROCEDURE — 99214 OFFICE O/P EST MOD 30 MIN: CPT | Performed by: PHYSICIAN ASSISTANT

## 2019-07-30 RX ORDER — TRIAMCINOLONE ACETONIDE 1 MG/G
1 CREAM TOPICAL 2 TIMES DAILY
Qty: 1 TUBE | Refills: 0 | Status: SHIPPED | OUTPATIENT
Start: 2019-07-30 | End: 2019-10-29

## 2019-07-31 ASSESSMENT — ENCOUNTER SYMPTOMS
DIARRHEA: 0
RHINORRHEA: 0
FATIGUE: 0
SORE THROAT: 0
COUGH: 0
VOMITING: 0
SHORTNESS OF BREATH: 0
FEVER: 0

## 2019-07-31 NOTE — PROGRESS NOTES
"Subjective:      Sue Can is a 68 y.o. female who presents with Rash (red, itchy rash on both shins)            Patient having rash on lower shins.  Upon further questioning she has been having bilateral knee pain and has been trying several over-the-counter measures.  She has used icy hot salonpos, apple cider vinegar, garlic paste for her joint pain.  She then wore knee braces over all of these different topical meds causing a burn    Rash   This is a new problem. The current episode started in the past 7 days. The problem is unchanged. The affected locations include the left lower leg and right lower leg. The rash is characterized by swelling, redness, itchiness and burning. She was exposed to chemicals. Pertinent negatives include no congestion, cough, diarrhea, fatigue, fever, joint pain, rhinorrhea, shortness of breath, sore throat or vomiting. Past treatments include nothing. The treatment provided no relief.       Review of Systems   Constitutional: Negative for fatigue and fever.   HENT: Negative for congestion, rhinorrhea and sore throat.    Respiratory: Negative for cough and shortness of breath.    Gastrointestinal: Negative for diarrhea and vomiting.   Musculoskeletal: Negative for joint pain.   Skin: Positive for rash.          Objective:     /84 (BP Location: Right arm, Patient Position: Sitting, BP Cuff Size: Adult)   Pulse 84   Temp 36.9 °C (98.4 °F) (*RETIRED* Temporal)   Ht 1.499 m (4' 11\")   Wt 69.4 kg (153 lb)   SpO2 98%   BMI 30.90 kg/m²      Physical Exam   Constitutional: She is oriented to person, place, and time. She appears well-developed and well-nourished. No distress.   HENT:   Head: Normocephalic and atraumatic.   Eyes: Conjunctivae are normal.   Neck: Normal range of motion. Neck supple.   Cardiovascular: Normal rate, regular rhythm and normal heart sounds.   Pulmonary/Chest: Effort normal and breath sounds normal. No respiratory distress. She has no wheezes.   "   Neurological: She is alert and oriented to person, place, and time.   Skin: Skin is warm and dry. Rash (Erythematous, burning, pruritic rash on bilateral shins consistent with chemical dermatitis) noted. She is not diaphoretic.        Psychiatric: She has a normal mood and affect. Her behavior is normal. Judgment and thought content normal.   Nursing note and vitals reviewed.              Assessment/Plan:     1. Chemical dermatitis  triamcinolone acetonide (KENALOG) 0.1 % Cream     Advised patient to not use any other over-the-counter topical remedies for her knees at this time.  Kenalog cream for symptomatic relief.  OTC meds and conservative measures as discussed    Return to clinic or go to ED if symptoms worsen or persist. Indications for ED discussed at length. Patient voices understanding. Follow-up with your primary care provider in 3-5 days. Red flags discussed. All side effects of medication discussed including allergic response, GI upset, tendon injury, etc.    Please note that this dictation was created using voice recognition software. I have made every reasonable attempt to correct obvious errors, but I expect that there are errors of grammar and possibly content that I did not discover before finalizing the note.

## 2019-09-21 DIAGNOSIS — M10.9 GOUT, ARTHRITIS: ICD-10-CM

## 2019-09-23 RX ORDER — LOSARTAN POTASSIUM 50 MG/1
TABLET ORAL
Qty: 90 TAB | Refills: 0 | Status: SHIPPED | OUTPATIENT
Start: 2019-09-23 | End: 2019-10-29

## 2019-09-23 RX ORDER — ALLOPURINOL 300 MG/1
TABLET ORAL
Qty: 90 TAB | Refills: 1 | Status: SHIPPED | OUTPATIENT
Start: 2019-09-23 | End: 2020-03-09 | Stop reason: SDUPTHER

## 2019-09-23 RX ORDER — ATORVASTATIN CALCIUM 80 MG/1
TABLET, FILM COATED ORAL
Qty: 45 TAB | Refills: 1 | Status: SHIPPED | OUTPATIENT
Start: 2019-09-23 | End: 2020-05-02

## 2019-09-23 RX ORDER — METOPROLOL SUCCINATE 25 MG/1
TABLET, EXTENDED RELEASE ORAL
Qty: 90 TAB | Refills: 1 | Status: SHIPPED | OUTPATIENT
Start: 2019-09-23 | End: 2019-10-29

## 2019-10-05 ENCOUNTER — IMMUNIZATION (OUTPATIENT)
Dept: SOCIAL WORK | Facility: CLINIC | Age: 69
End: 2019-10-05
Payer: COMMERCIAL

## 2019-10-05 DIAGNOSIS — Z23 NEED FOR VACCINATION: ICD-10-CM

## 2019-10-05 PROCEDURE — 90471 IMMUNIZATION ADMIN: CPT | Performed by: REGISTERED NURSE

## 2019-10-05 PROCEDURE — 90662 IIV NO PRSV INCREASED AG IM: CPT | Performed by: REGISTERED NURSE

## 2019-10-08 ENCOUNTER — HOSPITAL ENCOUNTER (OUTPATIENT)
Dept: LAB | Facility: MEDICAL CENTER | Age: 69
End: 2019-10-08
Attending: FAMILY MEDICINE
Payer: COMMERCIAL

## 2019-10-08 DIAGNOSIS — I10 ESSENTIAL HYPERTENSION: ICD-10-CM

## 2019-10-08 DIAGNOSIS — E11.9 TYPE 2 DIABETES MELLITUS WITHOUT COMPLICATION, WITHOUT LONG-TERM CURRENT USE OF INSULIN (HCC): ICD-10-CM

## 2019-10-08 DIAGNOSIS — M10.9 GOUT, ARTHROPATHY: ICD-10-CM

## 2019-10-08 DIAGNOSIS — E78.5 DYSLIPIDEMIA: ICD-10-CM

## 2019-10-08 LAB
ALBUMIN SERPL BCP-MCNC: 4.2 G/DL (ref 3.2–4.9)
ALBUMIN/GLOB SERPL: 1.4 G/DL
ALP SERPL-CCNC: 63 U/L (ref 30–99)
ALT SERPL-CCNC: 39 U/L (ref 2–50)
ANION GAP SERPL CALC-SCNC: 8 MMOL/L (ref 0–11.9)
AST SERPL-CCNC: 30 U/L (ref 12–45)
BILIRUB SERPL-MCNC: 0.7 MG/DL (ref 0.1–1.5)
BUN SERPL-MCNC: 14 MG/DL (ref 8–22)
CALCIUM SERPL-MCNC: 9.5 MG/DL (ref 8.5–10.5)
CHLORIDE SERPL-SCNC: 107 MMOL/L (ref 96–112)
CHOLEST SERPL-MCNC: 194 MG/DL (ref 100–199)
CO2 SERPL-SCNC: 26 MMOL/L (ref 20–33)
CREAT SERPL-MCNC: 0.55 MG/DL (ref 0.5–1.4)
CREAT UR-MCNC: 83.9 MG/DL
EST. AVERAGE GLUCOSE BLD GHB EST-MCNC: 143 MG/DL
FASTING STATUS PATIENT QL REPORTED: NORMAL
GLOBULIN SER CALC-MCNC: 3 G/DL (ref 1.9–3.5)
GLUCOSE SERPL-MCNC: 104 MG/DL (ref 65–99)
HBA1C MFR BLD: 6.6 % (ref 0–5.6)
HDLC SERPL-MCNC: 57 MG/DL
LDLC SERPL CALC-MCNC: 111 MG/DL
MICROALBUMIN UR-MCNC: 19.2 MG/DL
MICROALBUMIN/CREAT UR: 229 MG/G (ref 0–30)
POTASSIUM SERPL-SCNC: 4 MMOL/L (ref 3.6–5.5)
PROT SERPL-MCNC: 7.2 G/DL (ref 6–8.2)
SODIUM SERPL-SCNC: 141 MMOL/L (ref 135–145)
TRIGL SERPL-MCNC: 128 MG/DL (ref 0–149)
URATE SERPL-MCNC: 4.7 MG/DL (ref 1.9–8.2)

## 2019-10-08 PROCEDURE — 80061 LIPID PANEL: CPT

## 2019-10-08 PROCEDURE — 82570 ASSAY OF URINE CREATININE: CPT

## 2019-10-08 PROCEDURE — 80053 COMPREHEN METABOLIC PANEL: CPT

## 2019-10-08 PROCEDURE — 84550 ASSAY OF BLOOD/URIC ACID: CPT

## 2019-10-08 PROCEDURE — 82043 UR ALBUMIN QUANTITATIVE: CPT

## 2019-10-08 PROCEDURE — 83036 HEMOGLOBIN GLYCOSYLATED A1C: CPT

## 2019-10-08 PROCEDURE — 36415 COLL VENOUS BLD VENIPUNCTURE: CPT

## 2019-10-29 ENCOUNTER — OFFICE VISIT (OUTPATIENT)
Dept: MEDICAL GROUP | Facility: PHYSICIAN GROUP | Age: 69
End: 2019-10-29
Payer: COMMERCIAL

## 2019-10-29 VITALS
HEIGHT: 59 IN | WEIGHT: 155.87 LBS | DIASTOLIC BLOOD PRESSURE: 70 MMHG | BODY MASS INDEX: 31.42 KG/M2 | OXYGEN SATURATION: 95 % | HEART RATE: 79 BPM | TEMPERATURE: 98.7 F | SYSTOLIC BLOOD PRESSURE: 116 MMHG

## 2019-10-29 DIAGNOSIS — E11.29 TYPE 2 DIABETES MELLITUS WITH MICROALBUMINURIA, WITHOUT LONG-TERM CURRENT USE OF INSULIN (HCC): ICD-10-CM

## 2019-10-29 DIAGNOSIS — R80.9 MICROALBUMINURIA: ICD-10-CM

## 2019-10-29 DIAGNOSIS — E78.5 DYSLIPIDEMIA: ICD-10-CM

## 2019-10-29 DIAGNOSIS — M10.9 GOUT, ARTHROPATHY: ICD-10-CM

## 2019-10-29 DIAGNOSIS — I10 ESSENTIAL HYPERTENSION: ICD-10-CM

## 2019-10-29 DIAGNOSIS — R80.9 TYPE 2 DIABETES MELLITUS WITH MICROALBUMINURIA, WITHOUT LONG-TERM CURRENT USE OF INSULIN (HCC): ICD-10-CM

## 2019-10-29 DIAGNOSIS — Z11.59 NEED FOR HEPATITIS C SCREENING TEST: ICD-10-CM

## 2019-10-29 PROCEDURE — 99214 OFFICE O/P EST MOD 30 MIN: CPT | Performed by: FAMILY MEDICINE

## 2019-10-29 NOTE — LETTER
[facility]  Keeley Woodward M.D.  [address]  [facility] [address]  Fax: [phone]   Authorization for Release/Disclosure of   Protected Health Information   Name: SUE HO : 1950 SSN: [id number]   Address: [address] [place] [address]  [place] [address] Phone:    [phone] (home)    I authorize the entity listed below to release/disclose the PHI below to:   [facility]/Keeley Woodward M.D. and Keeley Woodward M.D.   Provider or Entity Name:    [facility]   Address   [facility], [facility], [place], [place] Phone:      Fax:     Reason for request: continuity of care   Information to be released:    [  ] LAST COLONOSCOPY,  including any PATH REPORT and follow-up  [  ] LAST FIT/COLOGUARD RESULT [  ] LAST DEXA  [  ] LAST MAMMOGRAM  [  ] LAST PAP  [  ] LAST LABS [x  ] RETINA EXAM REPORT  [  ] IMMUNIZATION RECORDS  [  ] Release all info      [  ] Check here and initial the line next to each item to release ALL health information INCLUDING  _____ Care and treatment for drug and / or alcohol abuse  _____ HIV testing, infection status, or AIDS  _____ Genetic Testing    DATES OF SERVICE OR TIME PERIOD TO BE DISCLOSED: _____________  I understand and acknowledge that:  * This Authorization may be revoked at any time by you in writing, except if your health information has already been used or disclosed.  * Your health information that will be used or disclosed as a result of you signing this authorization could be re-disclosed by the recipient. If this occurs, your re-disclosed health information may no longer be protected by State or Federal laws.  * You may refuse to sign this Authorization. Your refusal will not affect your ability to obtain treatment.  * This Authorization becomes effective upon signing and will  on (date) __________.      If no date is indicated, this Authorization will  one (1) year from the signature date.    Name: Sue Luo  Juan José    Signature:   Date:     10/29/2019       PLEASE FAX REQUESTED RECORDS BACK TO: [phone]

## 2019-10-29 NOTE — LETTER
[facility]  Keeley Woodward M.D.  [address]  [place] [address]  Fax: [phone]   Authorization for Release/Disclosure of   Protected Health Information   Name: SUE CAN : 1950 SSN: [id number]   Address: [address] [place] [address]  [place] [address] Phone:    [phone] (home)    I authorize the entity listed below to release/disclose the PHI below to:   [facility]/Keeley Woodward M.D. and Keeley Woodward M.D.   Provider or Entity Name:    [facility]   Address   City, State, Zip   Phone:      Fax:     Reason for request: continuity of care   Information to be released:    [ x ] LAST COLONOSCOPY,  including any PATH REPORT and follow-up  [  ] LAST FIT/COLOGUARD RESULT [  ] LAST DEXA  [  ] LAST MAMMOGRAM  [  ] LAST PAP  [  ] LAST LABS [  ] RETINA EXAM REPORT  [  ] IMMUNIZATION RECORDS  [  ] Release all info      [  ] Check here and initial the line next to each item to release ALL health information INCLUDING  _____ Care and treatment for drug and / or alcohol abuse  _____ HIV testing, infection status, or AIDS  _____ Genetic Testing    DATES OF SERVICE OR TIME PERIOD TO BE DISCLOSED: _____________  I understand and acknowledge that:  * This Authorization may be revoked at any time by you in writing, except if your health information has already been used or disclosed.  * Your health information that will be used or disclosed as a result of you signing this authorization could be re-disclosed by the recipient. If this occurs, your re-disclosed health information may no longer be protected by State or Federal laws.  * You may refuse to sign this Authorization. Your refusal will not affect your ability to obtain treatment.  * This Authorization becomes effective upon signing and will  on (date) __________.      If no date is indicated, this Authorization will  one (1) year from the signature date.    Name: Sue Can    Signature:   Date:     10/29/2019       PLEASE  FAX REQUESTED RECORDS BACK TO: [phone]

## 2019-10-29 NOTE — PATIENT INSTRUCTIONS

## 2019-10-29 NOTE — LETTER
[facility]  Keeley Woodward M.D.  [address]  [place] [address]  Fax: [phone]   Authorization for Release/Disclosure of   Protected Health Information   Name: SUE CAN : 1950 SSN: [id number]   Address: [address] [place] [address]  [place] [address] Phone:    [phone] (home)    I authorize the entity listed below to release/disclose the PHI below to:   [facility]/Keeley Woodward M.D. and Keeley Woodward M.D.   Provider or Entity Name:    [facility]   Address   City, [facility], [facility] [[email or web address]]   Phone:      Fax:     Reason for request: continuity of care   Information to be released:    [  ] LAST COLONOSCOPY,  including any PATH REPORT and follow-up  [  ] LAST FIT/COLOGUARD RESULT [  ] LAST DEXA  [  ] LAST MAMMOGRAM  [  ] LAST PAP  [  ] LAST LABS [  ] RETINA EXAM REPORT  [  ] IMMUNIZATION RECORDS  [ x ] Release all info      [  ] Check here and initial the line next to each item to release ALL health information INCLUDING  _____ Care and treatment for drug and / or alcohol abuse  _____ HIV testing, infection status, or AIDS  _____ Genetic Testing    DATES OF SERVICE OR TIME PERIOD TO BE DISCLOSED: _____________  I understand and acknowledge that:  * This Authorization may be revoked at any time by you in writing, except if your health information has already been used or disclosed.  * Your health information that will be used or disclosed as a result of you signing this authorization could be re-disclosed by the recipient. If this occurs, your re-disclosed health information may no longer be protected by State or Federal laws.  * You may refuse to sign this Authorization. Your refusal will not affect your ability to obtain treatment.  * This Authorization becomes effective upon signing and will  on (date) __________.      If no date is indicated, this Authorization will  one (1) year from the signature date.    Name: Sue Can    Signature:   Date:          10/29/2019       PLEASE FAX REQUESTED RECORDS BACK TO: [phone]

## 2019-10-29 NOTE — PROGRESS NOTES
Subjective:      Sue Can is a 69 y.o. female who presents with Diabetes and Annual Exam    HPI:    The patient is here for follow up on her chronic medical problems.  Her last visit was in December 2018.  She was supposed to come back last April but her appointment was canceled and she was not able to reschedule until now.    Diabetes Mellitus  Microalbuminuria  She continues to manage her diabetes mellitus type 2 through her own efforts of diet and exercise. She previously completed a diabetic retinal eye exam in January with Dr. Sue who is with [facility].  She was sequently referred to Dr. Tidwell, ophthalmologist for evaluation and treatment of glaucoma for which she was subsequently started on eyedrops. Blood work was completed prior to this visit. Which show her glucose is 104 and A1C is 6.6.  We have also been following her for microalbuminuria and her most recent results showed increased from .  Patient is already on losartan.    Essential Hypertension  She takes losartan 50 mg daily and metoprolol SR 25 mg daily for her hypertension without any side effects. Blood pressure in the office today is within goal.    Dyslipidemia  She has been taking atorvastatin 80 mg daily as prescribed for her dyslipidemia without myalgias or other side effects. Blood work was done before this visit showed her LDL remains elevated but all other results were at goal. She admits to being non-compliant with the advised diet to control her cholesterol.    Gout  She continues to take Allopurinol 300 mg mg for management of her gout attacks. She has not had any gout attacks recently.  Uric acid level remains below 6.0 at 4.7.    Health Maintenance  Patient is due for her mammogram and this is already scheduled for next month.  According to her record her last colonoscopy was in 2012 but we do not have the official report.  She also qualifies for a Hepatitis C screening based on her age.    Past medical  "history, past surgical history, family history reviewed-no changes    Social history reviewed-no changes    Allergies reviewed-no changes    Medications reviewed-no changes    ROS:  As per the HPI as shown above, the rest are negative.       Objective:     /70 (BP Location: Left arm, Patient Position: Sitting, BP Cuff Size: Adult)   Pulse 79   Temp 37.1 °C (98.7 °F) (Temporal)   Ht 1.499 m (4' 11\")   Wt 70.7 kg (155 lb 13.8 oz)   SpO2 95%   BMI 31.48 kg/m²     Physical Exam   Examined alert, awake, oriented, not in distress    Neck-supple, no lymphadenopathy, no thyromegaly  Lungs-clear to auscultation, no rales, no wheezes  Heart-regular rate and rhythm, no murmur  Extremities-no edema, clubbing, cyanosis     Labs:  No visits with results within 3 Week(s) from this visit.   Latest known visit with results is:   Hospital Outpatient Visit on 10/08/2019   Component Date Value Ref Range Status   • Uric Acid 10/08/2019 4.7  1.9 - 8.2 mg/dL Final   • Creatinine, Urine 10/08/2019 83.90  mg/dL Final   • Microalbumin, Urine Random 10/08/2019 19.2  mg/dL Final   • Micro Alb Creat Ratio 10/08/2019 229* 0 - 30 mg/g Final   • Glycohemoglobin 10/08/2019 6.6* 0.0 - 5.6 % Final    Comment: Increased risk for diabetes:  5.7 -6.4%  Diabetes:  >6.4%  Glycemic control for adults with diabetes:  <7.0%  The above interpretations are per ADA guidelines.  Diagnosis  of diabetes mellitus on the basis of elevated Hemoglobin A1c  should be confirmed by repeating the Hb A1c test.     • Est Avg Glucose 10/08/2019 143  mg/dL Final    Comment: The eAG calculation is based on the A1c-Derived Daily Glucose  (ADAG) study.  See the ADA's website for additional information.     • Sodium 10/08/2019 141  135 - 145 mmol/L Final   • Potassium 10/08/2019 4.0  3.6 - 5.5 mmol/L Final   • Chloride 10/08/2019 107  96 - 112 mmol/L Final   • Co2 10/08/2019 26  20 - 33 mmol/L Final   • Anion Gap 10/08/2019 8.0  0.0 - 11.9 Final   • Glucose 10/08/2019 " 104* 65 - 99 mg/dL Final   • Bun 10/08/2019 14  8 - 22 mg/dL Final   • Creatinine 10/08/2019 0.55  0.50 - 1.40 mg/dL Final   • Calcium 10/08/2019 9.5  8.5 - 10.5 mg/dL Final   • AST(SGOT) 10/08/2019 30  12 - 45 U/L Final   • ALT(SGPT) 10/08/2019 39  2 - 50 U/L Final   • Alkaline Phosphatase 10/08/2019 63  30 - 99 U/L Final   • Total Bilirubin 10/08/2019 0.7  0.1 - 1.5 mg/dL Final   • Albumin 10/08/2019 4.2  3.2 - 4.9 g/dL Final   • Total Protein 10/08/2019 7.2  6.0 - 8.2 g/dL Final   • Globulin 10/08/2019 3.0  1.9 - 3.5 g/dL Final   • A-G Ratio 10/08/2019 1.4  g/dL Final   • Cholesterol,Tot 10/08/2019 194  100 - 199 mg/dL Final   • Triglycerides 10/08/2019 128  0 - 149 mg/dL Final   • HDL 10/08/2019 57  >=40 mg/dL Final   • LDL 10/08/2019 111* <100 mg/dL Final   • Fasting Status 10/08/2019 Fasting   Final   • GFR If  10/08/2019 >60  >60 mL/min/1.73 m 2 Final   • GFR If Non  10/08/2019 >60  >60 mL/min/1.73 m 2 Final            Assessment/Plan:     1. Type 2 diabetes mellitus with microalbuminuria, without long-term current use of insulin (HCC)  Patient's diabetes is well controlled with a blood glucose of 104 and an A1C of 6.6.  She will continue to manage this with her own efforts without the need for medication. I advised her to make diet changes to improve her glucose levels. Advised her to avoid sweets, decrease her carbohydrate intake, exercise regularly and keep a healthy weight. Labs have been ordered for the next follow up visit.   - Lipid Profile; Future  - Comp Metabolic Panel; Future  - HEMOGLOBIN A1C; Future  - CBC WITH DIFFERENTIAL; Future  - HEP C VIRUS ANTIBODY; Future    2. Microalbuminuria  Urine microalbumin has increased from before.  She is already on losartan which affords renal protection.  Her overall kidney function is still in normal range.  We will continue to follow.  - Lipid Profile; Future  - Comp Metabolic Panel; Future  - HEMOGLOBIN A1C; Future  - CBC  WITH DIFFERENTIAL; Future  - HEP C VIRUS ANTIBODY; Future    3. Essential hypertension  Blood pressure is stable and within goal on current medications. We will continue the current dosages. I have advised her to avoid salty foods and exercise regularly.  - Lipid Profile; Future  - Comp Metabolic Panel; Future  - HEMOGLOBIN A1C; Future  - CBC WITH DIFFERENTIAL; Future  - HEP C VIRUS ANTIBODY; Future    4. Dyslipidemia  Her lipid panel shows LDL is not yet at goal.  She is noncompliant with her diet.  Discussed at length the diet she needs to follow.  Also advised regular exercise and weight loss.  She is already on maximum dose of atorvastatin 80 mg daily.  We will recheck lab work in 4 months.  She was given diet sheet to follow.  - Lipid Profile; Future  - Comp Metabolic Panel; Future  - HEMOGLOBIN A1C; Future  - CBC WITH DIFFERENTIAL; Future  - HEP C VIRUS ANTIBODY; Future    5. Gout, arthropathy  Patient has been stable with current management without gout attacks in a long time.  Uric acid level at target.  The new current dose of allopurinol.    - Lipid Profile; Future  - Comp Metabolic Panel; Future  - HEMOGLOBIN A1C; Future  - CBC WITH DIFFERENTIAL; Future  - HEP C VIRUS ANTIBODY; Future    6. Need for hepatitis C screening test  She qualifies for a Hepatitis C screening based on the CDC guidelines. Screening lab work ordered. She agrees to proceed with the test.   - Lipid Profile; Future  - Comp Metabolic Panel; Future  - HEMOGLOBIN A1C; Future  - CBC WITH DIFFERENTIAL; Future  - HEP C VIRUS ANTIBODY; Future      Martin CAMAAR (Segundo), am scribing for, and in the presence of, Keeley Woodward MD     Electronically signed by: Martin Prieto (Segundo), 10/29/2019    Keeley CAMARA MD personally performed the services described in this documentation, as scribed by Martin Prieto in my presence, and it is both accurate and complete.

## 2019-11-02 ENCOUNTER — HOSPITAL ENCOUNTER (OUTPATIENT)
Dept: RADIOLOGY | Facility: MEDICAL CENTER | Age: 69
End: 2019-11-02
Attending: FAMILY MEDICINE
Payer: COMMERCIAL

## 2019-11-02 DIAGNOSIS — Z12.31 VISIT FOR SCREENING MAMMOGRAM: ICD-10-CM

## 2019-11-02 PROCEDURE — 77063 BREAST TOMOSYNTHESIS BI: CPT

## 2019-11-14 ENCOUNTER — OFFICE VISIT (OUTPATIENT)
Dept: MEDICAL GROUP | Facility: PHYSICIAN GROUP | Age: 69
End: 2019-11-14
Payer: COMMERCIAL

## 2019-11-14 VITALS
TEMPERATURE: 99.5 F | HEART RATE: 93 BPM | SYSTOLIC BLOOD PRESSURE: 126 MMHG | WEIGHT: 155.2 LBS | HEIGHT: 59 IN | OXYGEN SATURATION: 94 % | DIASTOLIC BLOOD PRESSURE: 70 MMHG | BODY MASS INDEX: 31.29 KG/M2

## 2019-11-14 DIAGNOSIS — J06.9 ACUTE URI: ICD-10-CM

## 2019-11-14 PROCEDURE — 99214 OFFICE O/P EST MOD 30 MIN: CPT | Performed by: FAMILY MEDICINE

## 2019-11-14 RX ORDER — CODEINE PHOSPHATE AND GUAIFENESIN 10; 100 MG/5ML; MG/5ML
5 SOLUTION ORAL EVERY 4 HOURS PRN
Qty: 210 ML | Refills: 0 | Status: SHIPPED
Start: 2019-11-14 | End: 2020-03-19 | Stop reason: SDUPTHER

## 2019-11-14 ASSESSMENT — PATIENT HEALTH QUESTIONNAIRE - PHQ9: CLINICAL INTERPRETATION OF PHQ2 SCORE: 0

## 2019-11-14 NOTE — PROGRESS NOTES
"Subjective:      Sue Can is a 69 y.o. female who presents with Fever (cough)    HPI:    The patient is here for evaluation of an persistent cough which began 5 days ago.  She is accompanied by her sister.  She notes that 3 days ago the cough was at its worst, and it is productive with clear/white sputum without any blood.  The cough has made it difficult for the patient to sleep. She has not recently been exposed to any illness. The patient endorses associated fevers, headaches, and a sore throat which all began 5 days ago.  She has been drinking tea with turmeric and ankit with minimal help.    Past medical history, past surgical history, family history reviewed-no changes    Social history reviewed-no changes    Allergies reviewed-no changes    Medications reviewed-no changes    ROS:  As per the HPI as shown above, the rest are negative.     Objective:     /70 (BP Location: Left arm, Patient Position: Sitting, BP Cuff Size: Adult)   Pulse 93   Temp 37.5 °C (99.5 °F) (Temporal)   Ht 1.499 m (4' 11\")   Wt 70.4 kg (155 lb 3.3 oz)   SpO2 94%   BMI 31.35 kg/m²     Physical Exam  Examined alert, awake, oriented, not in distress    Ears-bilateral TM intact without signs of infection  Nose-turbinates normal without swelling, discharge, or obstruction   Throat-tonsils are normal size without exudate, or erythema  Neck-supple, no lymphadenopathy, no thyromegaly  Lungs-clear to auscultation, no rales, no wheezes  Heart-regular rate and rhythm, no murmur  Extremities-no edema, clubbing, cyanosis    Assessment/Plan:     1. Acute URI  I informed patient that this is likely a viral URI.  We will do conservative measures.  Advised increase p.o. fluids and rest.  Patient is on vacation all week this week and so she will have a chance to rest.  I will give her guaifenesin with codeine to help with her cough especially at night so she can sleep.  Made aware of potential for dizziness or grogginess.  Patient " does not drive so this will not create the problem in her situation.  Her sister will communicate with me if her symptoms worsen especially if she has colored phlegm as she may need antibiotic at that time.  - guaifenesin-codeine (ROBITUSSIN AC) Solution oral solution; Take 5 mL by mouth every four hours as needed for Cough for up to 7 days.  Dispense: 210 mL; Refill: 0    Conner CAMARA (Scribe), am scribing for, and in the presence of, Keeley Woodward MD     Electronically signed by: Conner Alberto (Scribe), 11/14/2019    Keeley CAMARA MD personally performed the services described in this documentation, as scribed by Conner Alberto in my presence, and it is both accurate and complete.

## 2019-12-31 DIAGNOSIS — I10 ESSENTIAL HYPERTENSION: ICD-10-CM

## 2019-12-31 RX ORDER — LOSARTAN POTASSIUM 50 MG/1
TABLET ORAL
Qty: 90 TAB | Refills: 1 | Status: SHIPPED | OUTPATIENT
Start: 2019-12-31 | End: 2020-08-18

## 2020-03-03 ENCOUNTER — APPOINTMENT (OUTPATIENT)
Dept: MEDICAL GROUP | Facility: PHYSICIAN GROUP | Age: 70
End: 2020-03-03
Payer: COMMERCIAL

## 2020-03-03 ENCOUNTER — HOSPITAL ENCOUNTER (OUTPATIENT)
Dept: LAB | Facility: MEDICAL CENTER | Age: 70
End: 2020-03-03
Attending: FAMILY MEDICINE
Payer: COMMERCIAL

## 2020-03-03 DIAGNOSIS — Z11.59 NEED FOR HEPATITIS C SCREENING TEST: ICD-10-CM

## 2020-03-03 DIAGNOSIS — E11.29 TYPE 2 DIABETES MELLITUS WITH MICROALBUMINURIA, WITHOUT LONG-TERM CURRENT USE OF INSULIN (HCC): ICD-10-CM

## 2020-03-03 DIAGNOSIS — R80.9 TYPE 2 DIABETES MELLITUS WITH MICROALBUMINURIA, WITHOUT LONG-TERM CURRENT USE OF INSULIN (HCC): ICD-10-CM

## 2020-03-03 DIAGNOSIS — M10.9 GOUT, ARTHROPATHY: ICD-10-CM

## 2020-03-03 DIAGNOSIS — E78.5 DYSLIPIDEMIA: ICD-10-CM

## 2020-03-03 DIAGNOSIS — R80.9 MICROALBUMINURIA: ICD-10-CM

## 2020-03-03 DIAGNOSIS — I10 ESSENTIAL HYPERTENSION: ICD-10-CM

## 2020-03-03 LAB
ALBUMIN SERPL BCP-MCNC: 3.8 G/DL (ref 3.2–4.9)
ALBUMIN/GLOB SERPL: 1.2 G/DL
ALP SERPL-CCNC: 53 U/L (ref 30–99)
ALT SERPL-CCNC: 26 U/L (ref 2–50)
ANION GAP SERPL CALC-SCNC: 10 MMOL/L (ref 0–11.9)
AST SERPL-CCNC: 21 U/L (ref 12–45)
BASOPHILS # BLD AUTO: 0.7 % (ref 0–1.8)
BASOPHILS # BLD: 0.05 K/UL (ref 0–0.12)
BILIRUB SERPL-MCNC: 0.9 MG/DL (ref 0.1–1.5)
BUN SERPL-MCNC: 12 MG/DL (ref 8–22)
CALCIUM SERPL-MCNC: 9.5 MG/DL (ref 8.5–10.5)
CHLORIDE SERPL-SCNC: 105 MMOL/L (ref 96–112)
CHOLEST SERPL-MCNC: 174 MG/DL (ref 100–199)
CO2 SERPL-SCNC: 25 MMOL/L (ref 20–33)
CREAT SERPL-MCNC: 0.64 MG/DL (ref 0.5–1.4)
EOSINOPHIL # BLD AUTO: 0.17 K/UL (ref 0–0.51)
EOSINOPHIL NFR BLD: 2.5 % (ref 0–6.9)
ERYTHROCYTE [DISTWIDTH] IN BLOOD BY AUTOMATED COUNT: 45.1 FL (ref 35.9–50)
EST. AVERAGE GLUCOSE BLD GHB EST-MCNC: 148 MG/DL
GLOBULIN SER CALC-MCNC: 3.3 G/DL (ref 1.9–3.5)
GLUCOSE SERPL-MCNC: 95 MG/DL (ref 65–99)
HBA1C MFR BLD: 6.8 % (ref 0–5.6)
HCT VFR BLD AUTO: 46 % (ref 37–47)
HCV AB SER QL: NEGATIVE
HDLC SERPL-MCNC: 58 MG/DL
HGB BLD-MCNC: 14.6 G/DL (ref 12–16)
IMM GRANULOCYTES # BLD AUTO: 0.03 K/UL (ref 0–0.11)
IMM GRANULOCYTES NFR BLD AUTO: 0.4 % (ref 0–0.9)
LDLC SERPL CALC-MCNC: 87 MG/DL
LYMPHOCYTES # BLD AUTO: 3.02 K/UL (ref 1–4.8)
LYMPHOCYTES NFR BLD: 44.9 % (ref 22–41)
MCH RBC QN AUTO: 29.9 PG (ref 27–33)
MCHC RBC AUTO-ENTMCNC: 31.7 G/DL (ref 33.6–35)
MCV RBC AUTO: 94.3 FL (ref 81.4–97.8)
MONOCYTES # BLD AUTO: 0.38 K/UL (ref 0–0.85)
MONOCYTES NFR BLD AUTO: 5.7 % (ref 0–13.4)
NEUTROPHILS # BLD AUTO: 3.07 K/UL (ref 2–7.15)
NEUTROPHILS NFR BLD: 45.8 % (ref 44–72)
NRBC # BLD AUTO: 0 K/UL
NRBC BLD-RTO: 0 /100 WBC
PLATELET # BLD AUTO: 207 K/UL (ref 164–446)
PMV BLD AUTO: 9.7 FL (ref 9–12.9)
POTASSIUM SERPL-SCNC: 3.9 MMOL/L (ref 3.6–5.5)
PROT SERPL-MCNC: 7.1 G/DL (ref 6–8.2)
RBC # BLD AUTO: 4.88 M/UL (ref 4.2–5.4)
SODIUM SERPL-SCNC: 140 MMOL/L (ref 135–145)
TRIGL SERPL-MCNC: 143 MG/DL (ref 0–149)
WBC # BLD AUTO: 6.7 K/UL (ref 4.8–10.8)

## 2020-03-03 PROCEDURE — 36415 COLL VENOUS BLD VENIPUNCTURE: CPT

## 2020-03-03 PROCEDURE — 80053 COMPREHEN METABOLIC PANEL: CPT

## 2020-03-03 PROCEDURE — 83036 HEMOGLOBIN GLYCOSYLATED A1C: CPT

## 2020-03-03 PROCEDURE — 86803 HEPATITIS C AB TEST: CPT

## 2020-03-03 PROCEDURE — 80061 LIPID PANEL: CPT

## 2020-03-03 PROCEDURE — 85025 COMPLETE CBC W/AUTO DIFF WBC: CPT

## 2020-03-09 ENCOUNTER — OFFICE VISIT (OUTPATIENT)
Dept: MEDICAL GROUP | Facility: PHYSICIAN GROUP | Age: 70
End: 2020-03-09
Payer: COMMERCIAL

## 2020-03-09 VITALS
BODY MASS INDEX: 32.36 KG/M2 | WEIGHT: 160.5 LBS | OXYGEN SATURATION: 95 % | TEMPERATURE: 97.7 F | DIASTOLIC BLOOD PRESSURE: 80 MMHG | HEART RATE: 109 BPM | HEIGHT: 59 IN | SYSTOLIC BLOOD PRESSURE: 142 MMHG

## 2020-03-09 DIAGNOSIS — E78.5 DYSLIPIDEMIA: ICD-10-CM

## 2020-03-09 DIAGNOSIS — R80.9 TYPE 2 DIABETES MELLITUS WITH MICROALBUMINURIA, WITHOUT LONG-TERM CURRENT USE OF INSULIN (HCC): ICD-10-CM

## 2020-03-09 DIAGNOSIS — M10.9 GOUT, ARTHRITIS: ICD-10-CM

## 2020-03-09 DIAGNOSIS — E11.29 TYPE 2 DIABETES MELLITUS WITH MICROALBUMINURIA, WITHOUT LONG-TERM CURRENT USE OF INSULIN (HCC): ICD-10-CM

## 2020-03-09 DIAGNOSIS — R80.9 MICROALBUMINURIA: ICD-10-CM

## 2020-03-09 DIAGNOSIS — I10 ESSENTIAL HYPERTENSION: ICD-10-CM

## 2020-03-09 PROCEDURE — 99214 OFFICE O/P EST MOD 30 MIN: CPT | Performed by: FAMILY MEDICINE

## 2020-03-09 RX ORDER — ATORVASTATIN CALCIUM 40 MG/1
40 TABLET, FILM COATED ORAL EVERY EVENING
Qty: 90 TAB | Refills: 1 | Status: SHIPPED | OUTPATIENT
Start: 2020-03-09 | End: 2021-02-17 | Stop reason: SDUPTHER

## 2020-03-09 RX ORDER — LATANOPROST 50 UG/ML
SOLUTION/ DROPS OPHTHALMIC
COMMUNITY
Start: 2020-02-24

## 2020-03-09 RX ORDER — METOPROLOL SUCCINATE 25 MG/1
TABLET, EXTENDED RELEASE ORAL
Qty: 90 TAB | Refills: 1 | Status: SHIPPED | OUTPATIENT
Start: 2020-03-09 | End: 2020-05-02

## 2020-03-09 RX ORDER — ALLOPURINOL 300 MG/1
TABLET ORAL
Qty: 90 TAB | Refills: 1 | Status: SHIPPED | OUTPATIENT
Start: 2020-03-09 | End: 2020-05-02

## 2020-03-09 ASSESSMENT — PATIENT HEALTH QUESTIONNAIRE - PHQ9: CLINICAL INTERPRETATION OF PHQ2 SCORE: 0

## 2020-03-09 ASSESSMENT — FIBROSIS 4 INDEX: FIB4 SCORE: 1.33

## 2020-03-09 NOTE — PROGRESS NOTES
"Subjective:      Sue Can is a 69 y.o. female who presents with Diabetes and Results (labs)      HPI:    The patient is here for follow up on her chronic medical problems.      Diabetes Mellitus  Microalbuminuria  She continues to manage her diabetes mellitus type 2 through her own efforts of diet and exercise. She is due for a monofilament exam today.  She is on losartan for microalbuminuria and hypertension.  Blood work was completed prior to this visit. We have also been following her for microalbuminuria and her most recent results showed an increase from 98 to 229 in 10/2019.  Patient is already on losartan.    Essential Hypertension  She takes losartan 50 mg and metoprolol SR 25 mg for her hypertension without any side effects. Blood pressure in the office today is elevated at 140/80. Upon recheck, it is 142/80.  According to the sister who always comes with her to the appointment, patient has been eating salty foods specifically soy sauce.    Dyslipidemia  She has been taking atorvastatin 80 mg half a tablet daily as prescribed for her dyslipidemia without myalgias or other side effects. Blood work was done before this visit.  She has occasional left leg cramps after sitting for a long time in the bus or when she is sleeping at night but denies any muscle pain.     Gout, arthritis  She continues to take Allopurinol 300 mg mg for management of her gout attacks. She has not had any gout attacks recently.      Past medical history, past surgical history, family history reviewed-no changes    Social history reviewed-no changes    Allergies reviewed-no changes    Medications reviewed-no changes     ROS:  As per the HPI as shown above, the rest are negative.       Objective:     /80   Pulse (!) 109   Temp 36.5 °C (97.7 °F)   Ht 1.499 m (4' 11\")   Wt 72.8 kg (160 lb 7.9 oz)   SpO2 95%   BMI 32.42 kg/m²     Physical Exam  Examined alert, awake, oriented, not in distress    Neck-supple, no " lymphadenopathy, no thyromegaly  Lungs-clear to auscultation, no rales, no wheezes  Heart-regular rate and rhythm, no murmur  Extremities-no edema, clubbing, cyanosis. Callous on the medial side of the left big toe and lateral side of the distal 5th metatarsal. There is another callous on the right distal 5th metatarsal. Left great toe is onychomycotic      Monofilament testing with a 10 gram force: sensation intact: intact bilaterally  Visual Inspection: Feet without maceration, ulcers, fissures.  Pedal pulses: intact bilaterally        Labs:  Hospital Outpatient Visit on 03/03/2020   Component Date Value Ref Range Status   • Hepatitis C Antibody 03/03/2020 Negative  Negative Final    Comment: The [employer] Centaur HCV assay is limited to the detection of IgG  antibodies to hepatitis C virus in human serum.  The results  from this or any other diagnostic kit should be used and interpreted  only in the context of the overall clinical picture.  A negative  test result does not exclude the possibility of exposure to  hepatitis C virus.     • WBC 03/03/2020 6.7  4.8 - 10.8 K/uL Final   • RBC 03/03/2020 4.88  4.20 - 5.40 M/uL Final   • Hemoglobin 03/03/2020 14.6  12.0 - 16.0 g/dL Final   • Hematocrit 03/03/2020 46.0  37.0 - 47.0 % Final   • MCV 03/03/2020 94.3  81.4 - 97.8 fL Final   • MCH 03/03/2020 29.9  27.0 - 33.0 pg Final   • MCHC 03/03/2020 31.7* 33.6 - 35.0 g/dL Final   • RDW 03/03/2020 45.1  35.9 - 50.0 fL Final   • Platelet Count 03/03/2020 207  164 - 446 K/uL Final   • MPV 03/03/2020 9.7  9.0 - 12.9 fL Final   • Neutrophils-Polys 03/03/2020 45.80  44.00 - 72.00 % Final   • Lymphocytes 03/03/2020 44.90* 22.00 - 41.00 % Final   • Monocytes 03/03/2020 5.70  0.00 - 13.40 % Final   • Eosinophils 03/03/2020 2.50  0.00 - 6.90 % Final   • Basophils 03/03/2020 0.70  0.00 - 1.80 % Final   • Immature Granulocytes 03/03/2020 0.40  0.00 - 0.90 % Final   • Nucleated RBC 03/03/2020 0.00  /100 WBC Final   • Neutrophils (Absolute)  03/03/2020 3.07  2.00 - 7.15 K/uL Final    Includes immature neutrophils, if present.   • Lymphs (Absolute) 03/03/2020 3.02  1.00 - 4.80 K/uL Final   • Monos (Absolute) 03/03/2020 0.38  0.00 - 0.85 K/uL Final   • Eos (Absolute) 03/03/2020 0.17  0.00 - 0.51 K/uL Final   • Baso (Absolute) 03/03/2020 0.05  0.00 - 0.12 K/uL Final   • Immature Granulocytes (abs) 03/03/2020 0.03  0.00 - 0.11 K/uL Final   • NRBC (Absolute) 03/03/2020 0.00  K/uL Final   • Glycohemoglobin 03/03/2020 6.8* 0.0 - 5.6 % Final    Comment: Increased risk for diabetes:  5.7 -6.4%  Diabetes:  >6.4%  Glycemic control for adults with diabetes:  <7.0%  The above interpretations are per ADA guidelines.  Diagnosis  of diabetes mellitus on the basis of elevated Hemoglobin A1c  should be confirmed by repeating the Hb A1c test.     • Est Avg Glucose 03/03/2020 148  mg/dL Final    Comment: The eAG calculation is based on the A1c-Derived Daily Glucose  (ADAG) study.  See the ADA's website for additional information.     • Sodium 03/03/2020 140  135 - 145 mmol/L Final   • Potassium 03/03/2020 3.9  3.6 - 5.5 mmol/L Final   • Chloride 03/03/2020 105  96 - 112 mmol/L Final   • Co2 03/03/2020 25  20 - 33 mmol/L Final   • Anion Gap 03/03/2020 10.0  0.0 - 11.9 Final   • Glucose 03/03/2020 95  65 - 99 mg/dL Final   • Bun 03/03/2020 12  8 - 22 mg/dL Final   • Creatinine 03/03/2020 0.64  0.50 - 1.40 mg/dL Final   • Calcium 03/03/2020 9.5  8.5 - 10.5 mg/dL Final   • AST(SGOT) 03/03/2020 21  12 - 45 U/L Final   • ALT(SGPT) 03/03/2020 26  2 - 50 U/L Final   • Alkaline Phosphatase 03/03/2020 53  30 - 99 U/L Final   • Total Bilirubin 03/03/2020 0.9  0.1 - 1.5 mg/dL Final   • Albumin 03/03/2020 3.8  3.2 - 4.9 g/dL Final   • Total Protein 03/03/2020 7.1  6.0 - 8.2 g/dL Final   • Globulin 03/03/2020 3.3  1.9 - 3.5 g/dL Final   • A-G Ratio 03/03/2020 1.2  g/dL Final   • Cholesterol,Tot 03/03/2020 174  100 - 199 mg/dL Final   • Triglycerides 03/03/2020 143  0 - 149 mg/dL Final    • HDL 03/03/2020 58  >=40 mg/dL Final   • LDL 03/03/2020 87  <100 mg/dL Final   • GFR If  03/03/2020 >60  >60 mL/min/1.73 m 2 Final   • GFR If Non  03/03/2020 >60  >60 mL/min/1.73 m 2 Final        Assessment/Plan:     1. Type 2 diabetes mellitus with microalbuminuria, without long-term current use of insulin (HCC)  Patient's diabetes is well controlled with a blood glucose of 95 and an A1C of 6.8. She will continue to manage this with her own efforts without the need for medication. I advised her to make diet changes to improve her glucose levels. Advised her to avoid sweets, decrease her carbohydrate intake, exercise regularly and keep a healthy weight.   - Diabetic Monofilament Lower Extremity Exam  - Lipid Profile; Future  - Comp Metabolic Panel; Future  - HEMOGLOBIN A1C; Future  - MICROALBUMIN CREAT RATIO URINE; Future    2. Microalbuminuria  Most recent urine microalbumin from 10/2019 was 229, increased from before. She is already on losartan which affords renal protection.  Her overall kidney function is still in normal range.  Labs have been ordered for the next follow up visit.   - Lipid Profile; Future  - Comp Metabolic Panel; Future  - HEMOGLOBIN A1C; Future  - MICROALBUMIN CREAT RATIO URINE; Future    3. Essential hypertension  Systolic blood pressure is borderline high and higher than previous visit.  I have advised her to avoid salty foods and exercise regularly.  Advised to lose weight.  Monitor blood pressure at home and keep a record.  I will reevaluate in 4 months.  - Lipid Profile; Future  - Comp Metabolic Panel; Future  - HEMOGLOBIN A1C; Future  - MICROALBUMIN CREAT RATIO URINE; Future  - metoprolol SR (TOPROL XL) 25 MG TABLET SR 24 HR; TAKE 1 TABLET BY MOUTH ONCE DAILY  Dispense: 90 Tab; Refill: 1    4. Dyslipidemia  Her lipid panel shows improved LDL from 111 to 87. Cholesterol, triglycerides and HDL are within normal limits. Stable on current atorvastatin 80 mg  half a tablet daily.  I wrote a prescription for atorvastatin 40 mg and she will take 1 whole tablet every night.  I have advised the patient to increase her exercise regimen, lose weight, and avoid fatty foods. Labs have been ordered for the next follow up visit.    - Lipid Profile; Future  - Comp Metabolic Panel; Future  - HEMOGLOBIN A1C; Future  - MICROALBUMIN CREAT RATIO URINE; Future  - atorvastatin (LIPITOR) 40 MG Tab; Take 1 Tab by mouth every evening.  Dispense: 90 Tab; Refill: 1    5. Gout, arthropathy  Patient is stable with current management without recent gout attacks. She will continue on allopurinol as prescribed.  Last uric acid was at goal 4.7 in October 2019.  - allopurinol (ZYLOPRIM) 300 MG Tab; TAKE 1 TABLET BY MOUTH ONCE DAILY  Dispense: 90 Tab; Refill: 1      Elver CAMARA (Scribe), am scribing for, and in the presence of, Keeley Woodward MD     Electronically signed by: Elver Valles (Sandrae), 3/9/2020    Keeley CAMARA MD personally performed the services described in this documentation, as scribed by Elver Valles in my presence, and it is both accurate and complete.

## 2020-03-19 ENCOUNTER — TELEPHONE (OUTPATIENT)
Dept: HEALTH INFORMATION MANAGEMENT | Facility: OTHER | Age: 70
End: 2020-03-19

## 2020-03-19 DIAGNOSIS — J06.9 ACUTE URI: ICD-10-CM

## 2020-03-19 RX ORDER — CODEINE PHOSPHATE AND GUAIFENESIN 10; 100 MG/5ML; MG/5ML
5 SOLUTION ORAL EVERY 4 HOURS PRN
Qty: 210 ML | Refills: 0 | Status: SHIPPED
Start: 2020-03-19 | End: 2020-03-26

## 2020-03-19 NOTE — TELEPHONE ENCOUNTER
1. Caller Name: Noa                       Call Back Number: [phone]  [facility] PCP or Specialty Provider: Jennyfer Woodward        2.  Does patient have any active symptoms of respiratory illness (fever OR cough OR shortness of breath)? Yes, the patient reports the following respiratory symptoms: cough and fever 99*.    3.  Does patient have any comoribidities? None     4.  In the last 30 days, has the patient traveled outside of the country OR in a high risk area within the [place] OR have any known contact with someone who has or is suspected to have COVID-19?  No.    5. Disposition: Advised to perform self care, monitor for worsening symptoms and to call back in 3 days if no improvement    Note routed to PCP: Provider action needed: Patient needs a note to be excused from work to continue to stay home. Her sister is also wondering if she could get a refill of the cough syrup that she got last 11/2019. Advised that I would route a note PCP to determine if this was an option.

## 2020-03-19 NOTE — TELEPHONE ENCOUNTER
I will excuse her from work for 3 days starting today (3/19-21/2020).  Work note done.  Patient can  the work note at the .  Prescription for cough syrup with codeine will be sent to pharmacy.

## 2020-05-01 DIAGNOSIS — I10 ESSENTIAL HYPERTENSION: ICD-10-CM

## 2020-05-01 DIAGNOSIS — M10.9 GOUT, ARTHRITIS: ICD-10-CM

## 2020-05-02 RX ORDER — METOPROLOL SUCCINATE 25 MG/1
TABLET, EXTENDED RELEASE ORAL
Qty: 90 TAB | Refills: 1 | Status: SHIPPED | OUTPATIENT
Start: 2020-05-02 | End: 2021-02-17 | Stop reason: SDUPTHER

## 2020-05-02 RX ORDER — ALLOPURINOL 300 MG/1
TABLET ORAL
Qty: 90 TAB | Refills: 1 | Status: SHIPPED | OUTPATIENT
Start: 2020-05-02 | End: 2020-11-30

## 2020-05-02 RX ORDER — ATORVASTATIN CALCIUM 80 MG/1
TABLET, FILM COATED ORAL
Qty: 45 TAB | Refills: 1 | Status: SHIPPED | OUTPATIENT
Start: 2020-05-02 | End: 2020-11-30

## 2020-05-29 ENCOUNTER — HOSPITAL ENCOUNTER (OUTPATIENT)
Facility: MEDICAL CENTER | Age: 70
End: 2020-05-29
Payer: COMMERCIAL

## 2020-06-02 LAB
SARS-COV-2 RNA SPEC QL NAA+PROBE: NOT DETECTED
SPECIMEN SOURCE: NORMAL

## 2020-08-18 DIAGNOSIS — I10 ESSENTIAL HYPERTENSION: ICD-10-CM

## 2020-08-18 RX ORDER — LOSARTAN POTASSIUM 50 MG/1
TABLET ORAL
Qty: 90 TAB | Refills: 0 | Status: SHIPPED | OUTPATIENT
Start: 2020-08-18 | End: 2020-11-30

## 2020-09-26 ENCOUNTER — IMMUNIZATION (OUTPATIENT)
Dept: SOCIAL WORK | Facility: CLINIC | Age: 70
End: 2020-09-26
Payer: COMMERCIAL

## 2020-09-26 DIAGNOSIS — Z23 NEED FOR VACCINATION: ICD-10-CM

## 2020-09-26 PROCEDURE — 90471 IMMUNIZATION ADMIN: CPT | Performed by: REGISTERED NURSE

## 2020-09-26 PROCEDURE — 90662 IIV NO PRSV INCREASED AG IM: CPT | Performed by: REGISTERED NURSE

## 2021-02-08 ENCOUNTER — TELEPHONE (OUTPATIENT)
Dept: MEDICAL GROUP | Facility: PHYSICIAN GROUP | Age: 71
End: 2021-02-08

## 2021-02-08 NOTE — TELEPHONE ENCOUNTER
Left message for patient to call back regarding pre-visit planning. Please transfer call to [phone].

## 2021-02-10 ENCOUNTER — APPOINTMENT (OUTPATIENT)
Dept: MEDICAL GROUP | Facility: PHYSICIAN GROUP | Age: 71
End: 2021-02-10
Payer: COMMERCIAL

## 2021-02-10 ENCOUNTER — HOSPITAL ENCOUNTER (OUTPATIENT)
Dept: LAB | Facility: MEDICAL CENTER | Age: 71
End: 2021-02-10
Attending: FAMILY MEDICINE
Payer: COMMERCIAL

## 2021-02-10 DIAGNOSIS — E11.29 TYPE 2 DIABETES MELLITUS WITH MICROALBUMINURIA, WITHOUT LONG-TERM CURRENT USE OF INSULIN (HCC): ICD-10-CM

## 2021-02-10 DIAGNOSIS — R80.9 TYPE 2 DIABETES MELLITUS WITH MICROALBUMINURIA, WITHOUT LONG-TERM CURRENT USE OF INSULIN (HCC): ICD-10-CM

## 2021-02-10 DIAGNOSIS — I10 ESSENTIAL HYPERTENSION: ICD-10-CM

## 2021-02-10 DIAGNOSIS — E78.5 DYSLIPIDEMIA: ICD-10-CM

## 2021-02-10 DIAGNOSIS — R80.9 MICROALBUMINURIA: ICD-10-CM

## 2021-02-10 LAB
ALBUMIN SERPL BCP-MCNC: 4.2 G/DL (ref 3.2–4.9)
ALBUMIN/GLOB SERPL: 1.2 G/DL
ALP SERPL-CCNC: 72 U/L (ref 30–99)
ALT SERPL-CCNC: 23 U/L (ref 2–50)
ANION GAP SERPL CALC-SCNC: 9 MMOL/L (ref 7–16)
AST SERPL-CCNC: 26 U/L (ref 12–45)
BILIRUB SERPL-MCNC: 0.5 MG/DL (ref 0.1–1.5)
BUN SERPL-MCNC: 11 MG/DL (ref 8–22)
CALCIUM SERPL-MCNC: 9.6 MG/DL (ref 8.5–10.5)
CHLORIDE SERPL-SCNC: 102 MMOL/L (ref 96–112)
CHOLEST SERPL-MCNC: 205 MG/DL (ref 100–199)
CO2 SERPL-SCNC: 26 MMOL/L (ref 20–33)
CREAT SERPL-MCNC: 0.52 MG/DL (ref 0.5–1.4)
CREAT UR-MCNC: 94.72 MG/DL
EST. AVERAGE GLUCOSE BLD GHB EST-MCNC: 157 MG/DL
GLOBULIN SER CALC-MCNC: 3.5 G/DL (ref 1.9–3.5)
GLUCOSE SERPL-MCNC: 106 MG/DL (ref 65–99)
HBA1C MFR BLD: 7.1 % (ref 0–5.6)
HDLC SERPL-MCNC: 58 MG/DL
LDLC SERPL CALC-MCNC: 118 MG/DL
MICROALBUMIN UR-MCNC: 29 MG/DL
MICROALBUMIN/CREAT UR: 306 MG/G (ref 0–30)
POTASSIUM SERPL-SCNC: 4.4 MMOL/L (ref 3.6–5.5)
PROT SERPL-MCNC: 7.7 G/DL (ref 6–8.2)
SODIUM SERPL-SCNC: 137 MMOL/L (ref 135–145)
TRIGL SERPL-MCNC: 143 MG/DL (ref 0–149)

## 2021-02-10 PROCEDURE — 80061 LIPID PANEL: CPT

## 2021-02-10 PROCEDURE — 83036 HEMOGLOBIN GLYCOSYLATED A1C: CPT

## 2021-02-10 PROCEDURE — 82043 UR ALBUMIN QUANTITATIVE: CPT

## 2021-02-10 PROCEDURE — 36415 COLL VENOUS BLD VENIPUNCTURE: CPT

## 2021-02-10 PROCEDURE — 82570 ASSAY OF URINE CREATININE: CPT

## 2021-02-10 PROCEDURE — 80053 COMPREHEN METABOLIC PANEL: CPT

## 2021-02-10 NOTE — TELEPHONE ENCOUNTER
Phone Number Called: [phone] (home)       Call outcome: Left detailed message for patient. Informed to call back with any additional questions.    Message: PRE VISIT CARM[facility]

## 2021-02-17 ENCOUNTER — OFFICE VISIT (OUTPATIENT)
Dept: MEDICAL GROUP | Facility: PHYSICIAN GROUP | Age: 71
End: 2021-02-17
Payer: COMMERCIAL

## 2021-02-17 VITALS
HEART RATE: 68 BPM | BODY MASS INDEX: 31.47 KG/M2 | TEMPERATURE: 98.8 F | HEIGHT: 59 IN | OXYGEN SATURATION: 98 % | DIASTOLIC BLOOD PRESSURE: 70 MMHG | SYSTOLIC BLOOD PRESSURE: 140 MMHG | WEIGHT: 156.09 LBS

## 2021-02-17 DIAGNOSIS — Z12.31 ENCOUNTER FOR SCREENING MAMMOGRAM FOR MALIGNANT NEOPLASM OF BREAST: ICD-10-CM

## 2021-02-17 DIAGNOSIS — R80.9 MICROALBUMINURIA: ICD-10-CM

## 2021-02-17 DIAGNOSIS — E78.5 DYSLIPIDEMIA: ICD-10-CM

## 2021-02-17 DIAGNOSIS — M10.9 GOUT, ARTHRITIS: ICD-10-CM

## 2021-02-17 DIAGNOSIS — M85.89 OSTEOPENIA OF MULTIPLE SITES: ICD-10-CM

## 2021-02-17 DIAGNOSIS — I10 ESSENTIAL HYPERTENSION: ICD-10-CM

## 2021-02-17 PROCEDURE — 99214 OFFICE O/P EST MOD 30 MIN: CPT | Performed by: FAMILY MEDICINE

## 2021-02-17 RX ORDER — METOPROLOL SUCCINATE 25 MG/1
25 TABLET, EXTENDED RELEASE ORAL DAILY
Qty: 90 TABLET | Refills: 1 | Status: SHIPPED | OUTPATIENT
Start: 2021-02-17 | End: 2021-11-29

## 2021-02-17 RX ORDER — LOSARTAN POTASSIUM 50 MG/1
50 TABLET ORAL DAILY
Qty: 90 TABLET | Refills: 1 | Status: SHIPPED | OUTPATIENT
Start: 2021-02-17 | End: 2021-11-29

## 2021-02-17 RX ORDER — ALLOPURINOL 300 MG/1
300 TABLET ORAL DAILY
Qty: 90 TABLET | Refills: 1 | Status: SHIPPED | OUTPATIENT
Start: 2021-02-17 | End: 2021-11-29

## 2021-02-17 RX ORDER — ATORVASTATIN CALCIUM 40 MG/1
40 TABLET, FILM COATED ORAL EVERY EVENING
Qty: 90 TABLET | Refills: 1 | Status: SHIPPED | OUTPATIENT
Start: 2021-02-17 | End: 2021-11-29 | Stop reason: SDUPTHER

## 2021-02-17 ASSESSMENT — FIBROSIS 4 INDEX: FIB4 SCORE: 1.78

## 2021-02-17 ASSESSMENT — PATIENT HEALTH QUESTIONNAIRE - PHQ9: CLINICAL INTERPRETATION OF PHQ2 SCORE: 0

## 2021-02-18 NOTE — PROGRESS NOTES
"Subjective:      Sue Can is a 70 y.o. female who presents with Results (labs)            HPI     Patient returns for follow-up of her medical problems.  She is accompanied by her sister who always takes her to her appointments.  It is almost a year since the last visit which was in March 2020.    For her diabetes mellitus type 2, she continues to manage this with her own efforts but she has not been good in watching her diet.  Blood work was done before this visit.    She has microalbuminuria and she is already on losartan both for the microalbuminuria and hypertension.    For her hypertension, she continues to take losartan and metoprolol.  Blood pressure today is borderline elevated at 140/70 which is about the same as the last visit.    She continues to take atorvastatin for dyslipidemia without myalgias.    She continues to take allopurinol for her gout.  She has not had an attack of gout in a while.  She does get on and off pain in her knees and her feet.  She uses Salonpas on these areas.  She wants me to look at her left foot because she thinks this is swollen.    Past medical history, past surgical history, family history reviewed-no changes    Social history reviewed-no changes    Allergies reviewed-no changes    Medications reviewed-no changes    ROS     Per HPI, the rest are negative.       Objective:     /70 (BP Location: Left arm, Patient Position: Sitting, BP Cuff Size: Adult)   Pulse 68   Temp 37.1 °C (98.8 °F) (Temporal)   Ht 1.499 m (4' 11\")   Wt 70.8 kg (156 lb 1.4 oz)   SpO2 98%   BMI 31.53 kg/m²      Physical Exam     Examined alert, awake, oriented, not in distress    Neck-supple, no lymphadenopathy, no thyromegaly  Lungs-clear to auscultation, no rales, no wheezes  Heart-regular rate and rhythm, no murmur  Extremities-no edema, clubbing, cyanosis, she has prominent subcutaneous fat deposit top of the feet lateral aspect more on the left foot than the right " foot  Monofilament testing with a 10 gram force: sensation intact: intact bilaterally  Visual Inspection: Feet without maceration, ulcers, fissures.  Pedal pulses: intact bilaterally       Hospital Outpatient Visit on 02/10/2021   Component Date Value Ref Range Status   • Creatinine, Urine 02/10/2021 94.72  mg/dL Final   • Microalbumin, Urine Random 02/10/2021 29.0  mg/dL Final   • Micro Alb Creat Ratio 02/10/2021 306* 0 - 30 mg/g Final   • Glycohemoglobin 02/10/2021 7.1* 0.0 - 5.6 % Final    Comment: Increased risk for diabetes:  5.7 -6.4%  Diabetes:  >6.4%  Glycemic control for adults with diabetes:  <7.0%  The above interpretations are per ADA guidelines.  Diagnosis  of diabetes mellitus on the basis of elevated Hemoglobin A1c  should be confirmed by repeating the Hb A1c test.     • Est Avg Glucose 02/10/2021 157  mg/dL Final    Comment: The eAG calculation is based on the A1c-Derived Daily Glucose  (ADAG) study.  See the ADA's website for additional information.     • Sodium 02/10/2021 137  135 - 145 mmol/L Final   • Potassium 02/10/2021 4.4  3.6 - 5.5 mmol/L Final   • Chloride 02/10/2021 102  96 - 112 mmol/L Final   • Co2 02/10/2021 26  20 - 33 mmol/L Final   • Anion Gap 02/10/2021 9.0  7.0 - 16.0 Final   • Glucose 02/10/2021 106* 65 - 99 mg/dL Final   • Bun 02/10/2021 11  8 - 22 mg/dL Final   • Creatinine 02/10/2021 0.52  0.50 - 1.40 mg/dL Final   • Calcium 02/10/2021 9.6  8.5 - 10.5 mg/dL Final   • AST(SGOT) 02/10/2021 26  12 - 45 U/L Final   • ALT(SGPT) 02/10/2021 23  2 - 50 U/L Final   • Alkaline Phosphatase 02/10/2021 72  30 - 99 U/L Final   • Total Bilirubin 02/10/2021 0.5  0.1 - 1.5 mg/dL Final   • Albumin 02/10/2021 4.2  3.2 - 4.9 g/dL Final   • Total Protein 02/10/2021 7.7  6.0 - 8.2 g/dL Final   • Globulin 02/10/2021 3.5  1.9 - 3.5 g/dL Final   • A-G Ratio 02/10/2021 1.2  g/dL Final   • Cholesterol,Tot 02/10/2021 205* 100 - 199 mg/dL Final   • Triglycerides 02/10/2021 143  0 - 149 mg/dL Final   • HDL  02/10/2021 58  >=40 mg/dL Final   • LDL 02/10/2021 118* <100 mg/dL Final   • GFR If  02/10/2021 >60  >60 mL/min/1.73 m 2 Final   • GFR If Non  02/10/2021 >60  >60 mL/min/1.73 m 2 Final          Assessment/Plan:        1. Uncontrolled diabetes mellitus with microalbuminuria (HCC)  Hemoglobin A1c is now 7.1 which is slightly out of control.  For now we will have her work hard on avoidance of sweets, decreasing the carbs, regular exercises and weight loss.  She was given diet sheet to follow.  If there is no improvement in 3 months we will start Metformin.  - Diabetic Monofilament Lower Extremity Exam  - Lipid Profile; Future  - Comp Metabolic Panel; Future  - HEMOGLOBIN A1C; Future  - CBC WITH DIFFERENTIAL; Future    2. Microalbuminuria  She still has ongoing microalbuminuria.  This is higher than before.  Continue losartan.  We will get the diabetes under control to improve the microalbuminuria.   - Lipid Profile; Future  - Comp Metabolic Panel; Future  - HEMOGLOBIN A1C; Future  - CBC WITH DIFFERENTIAL; Future    3. Essential hypertension  Mild elevation of diastolic blood pressure.  Advised to watch her salt intake.  Advise regular exercises and weight loss.  Recheck next visit.  We will increase the dose of the losartan if this is not improved.  - Lipid Profile; Future  - Comp Metabolic Panel; Future  - HEMOGLOBIN A1C; Future  - CBC WITH DIFFERENTIAL; Future  - losartan (COZAAR) 50 MG Tab; Take 1 tablet by mouth every day.  Dispense: 90 tablet; Refill: 1  - metoprolol SR (TOPROL XL) 25 MG TABLET SR 24 HR; Take 1 tablet by mouth every day.  Dispense: 90 tablet; Refill: 1    4. Dyslipidemia  The LDL cholesterol has increased from 87-1 18.  She is already on atorvastatin 40 mg at bedtime.  We will have her work harder on avoidance of fatty foods and discussed at length the diet she needs to follow.  She was also given a diet sheet.  If no improvement next visit we will increase the dose  of atorvastatin.  - Lipid Profile; Future  - Comp Metabolic Panel; Future  - HEMOGLOBIN A1C; Future  - CBC WITH DIFFERENTIAL; Future  - atorvastatin (LIPITOR) 40 MG Tab; Take 1 tablet by mouth every evening.  Dispense: 90 tablet; Refill: 1    5. Encounter for screening mammogram for malignant neoplasm of breast  She is due for screening mammogram and this was ordered.  - MA-SCREENING MAMMO BILAT W/CAD; Future    6. Osteopenia of multiple sites  She is due for follow-up bone density scan and this was ordered.  - DS-BONE DENSITY STUDY (DEXA); Future    7. Gout, arthritis  Reassured patient the swelling in the foot is not from gout.  This is consistent with subcutaneous fat.  There is no evidence of infection in the foot.  Continue allopurinol.  - allopurinol (ZYLOPRIM) 300 MG Tab; Take 1 tablet by mouth every day.  Dispense: 90 tablet; Refill: 1    Follow-up in 3 months.      Please note that this dictation was created using voice recognition software. I have worked with consultants from the vendor as well as technical experts from [employer] â€“ [employer] [facility]  [employer] to optimize the interface. I have made every reasonable attempt to correct obvious errors, but I expect that there are errors of grammar and possibly content I did not discover before finalizing the note.

## 2021-02-18 NOTE — PATIENT INSTRUCTIONS
Cholesterol  Cholesterol is a fat. Your body needs a small amount of cholesterol. Cholesterol may build up in your blood vessels. This increases your chance of having a heart attack or stroke.  You cannot feel your cholesterol levels. The only way to know your cholesterol level is high is with a blood test. Keep your test results. Work with your doctor to keep your cholesterol at a good level.  WHAT DO THE TEST RESULTS MEAN?  · Total cholesterol is how much cholesterol is in your blood.  · LDL is bad cholesterol. This is the type that can build up. You want LDL to be low.  · HDL is good cholesterol. It cleans your blood vessels and carries LDL away. You want HDL to be high.  · Triglycerides are fat that the body can burn for energy or store.  WHAT ARE GOOD LEVELS OF CHOLESTEROL?  · Total cholesterol below 200.  · LDL below 100 for people at risk. Below 70 for those at very high risk.  · HDL above 50 is good. Above 60 is best.  · Triglycerides below 150.  HOW CAN I LOWER MY CHOLESTEROL?  · Diet. Follow your diet programs as told by your doctor.  ¨ Choose fish, white meat chicken, roasted turkey, or baked turkey. Try not to eat red meat, fried foods, or processed meats such as sausage and lunch meats.  ¨ Eat lots of fresh fruits and vegetables.  ¨ Choose whole grains, beans, pasta, potatoes, and cereals.  ¨ Use only small amounts of olive, corn, or canola oils.  ¨ Try not to eat butter, mayonnaise, shortening, or palm kernel oils.  ¨ Try not to eat foods with trans fats.  ¨ Drink skim or nonfat milk. Eat low-fat or nonfat yogurt and cheeses. Try not to drink whole milk or cream. Try not to eat ice cream, egg yolks, and full-fat cheeses.  ¨ Healthy desserts include mehdi food cake, ankit snaps, animal crackers, hard candy, popsicles, and low-fat or nonfat frozen yogurt. Try not to eat pastries, cakes, pies, and cookies.  · Exercise. Follow your exercise programs as told by your doctor.  ¨ Be more active. You can try  gardening, walking, or taking the stairs. Ask your doctor about how you can be more active.  · Medicine. Take medicine as told by your doctor.     This information is not intended to replace advice given to you by your health care provider. Make sure you discuss any questions you have with your health care provider.     Document Released: 03/16/2010 Document Revised: 01/08/2016 Document Reviewed: 10/01/2014  [employer] Patient Education ©2016 [employer]  Diabetes Mellitus and Nutrition, Adult  When you have diabetes (diabetes mellitus), it is very important to have healthy eating habits because your blood sugar (glucose) levels are greatly affected by what you eat and drink. Eating healthy foods in the appropriate amounts, at about the same times every day, can help you:  · Control your blood glucose.  · Lower your risk of heart disease.  · Improve your blood pressure.  · Reach or maintain a healthy weight.  Every person with diabetes is different, and each person has different needs for a meal plan. Your health care provider may recommend that you work with a diet and nutrition specialist (dietitian) to make a meal plan that is best for you. Your meal plan may vary depending on factors such as:  · The calories you need.  · The medicines you take.  · Your weight.  · Your blood glucose, blood pressure, and cholesterol levels.  · Your activity level.  · Other health conditions you have, such as heart or kidney disease.  How do carbohydrates affect me?  Carbohydrates, also called carbs, affect your blood glucose level more than any other type of food. Eating carbs naturally raises the amount of glucose in your blood. Carb counting is a method for keeping track of how many carbs you eat. Counting carbs is important to keep your blood glucose at a healthy level, especially if you use insulin or take certain oral diabetes medicines.  It is important to know how many carbs you can safely have in each meal.  "This is different for every person. Your dietitian can help you calculate how many carbs you should have at each meal and for each snack.  Foods that contain carbs include:  · Bread, cereal, rice, pasta, and crackers.  · Potatoes and corn.  · Peas, beans, and lentils.  · Milk and yogurt.  · Fruit and juice.  · Desserts, such as cakes, cookies, ice cream, and candy.  How does alcohol affect me?  Alcohol can cause a sudden decrease in blood glucose (hypoglycemia), especially if you use insulin or take certain oral diabetes medicines. Hypoglycemia can be a life-threatening condition. Symptoms of hypoglycemia (sleepiness, dizziness, and confusion) are similar to symptoms of having too much alcohol.  If your health care provider says that alcohol is safe for you, follow these guidelines:  · Limit alcohol intake to no more than 1 drink per day for nonpregnant women and 2 drinks per day for men. One drink equals 12 oz of beer, 5 oz of wine, or 1½ oz of hard liquor.  · Do not drink on an empty stomach.  · Keep yourself hydrated with water, diet soda, or unsweetened iced tea.  · Keep in mind that regular soda, juice, and other mixers may contain a lot of sugar and must be counted as carbs.  What are tips for following this plan?    Reading food labels  · Start by checking the serving size on the \"Nutrition Facts\" label of packaged foods and drinks. The amount of calories, carbs, fats, and other nutrients listed on the label is based on one serving of the item. Many items contain more than one serving per package.  · Check the total grams (g) of carbs in one serving. You can calculate the number of servings of carbs in one serving by dividing the total carbs by 15. For example, if a food has 30 g of total carbs, it would be equal to 2 servings of carbs.  · Check the number of grams (g) of saturated and trans fats in one serving. Choose foods that have low or no amount of these fats.  · Check the number of milligrams (mg) of " "salt (sodium) in one serving. Most people should limit total sodium intake to less than 2,300 mg per day.  · Always check the nutrition information of foods labeled as \"low-fat\" or \"nonfat\". These foods may be higher in added sugar or refined carbs and should be avoided.  · Talk to your dietitian to identify your daily goals for nutrients listed on the label.  Shopping  · Avoid buying canned, premade, or processed foods. These foods tend to be high in fat, sodium, and added sugar.  · Shop around the outside edge of the grocery store. This includes fresh fruits and vegetables, bulk grains, fresh meats, and fresh dairy.  Cooking  · Use low-heat cooking methods, such as baking, instead of high-heat cooking methods like deep frying.  · Cook using healthy oils, such as olive, canola, or sunflower oil.  · Avoid cooking with butter, cream, or high-fat meats.  Meal planning  · Eat meals and snacks regularly, preferably at the same times every day. Avoid going long periods of time without eating.  · Eat foods high in fiber, such as fresh fruits, vegetables, beans, and whole grains. Talk to your dietitian about how many servings of carbs you can eat at each meal.  · Eat 4-6 ounces (oz) of lean protein each day, such as lean meat, chicken, fish, eggs, or tofu. One oz of lean protein is equal to:  ? 1 oz of meat, chicken, or fish.  ? 1 egg.  ? ¼ cup of tofu.  · Eat some foods each day that contain healthy fats, such as avocado, nuts, seeds, and fish.  Lifestyle  · Check your blood glucose regularly.  · Exercise regularly as told by your health care provider. This may include:  ? 150 minutes of moderate-intensity or vigorous-intensity exercise each week. This could be brisk walking, biking, or water aerobics.  ? Stretching and doing strength exercises, such as yoga or weightlifting, at least 2 times a week.  · Take medicines as told by your health care provider.  · Do not use any products that contain nicotine or tobacco, such " as cigarettes and e-cigarettes. If you need help quitting, ask your health care provider.  · Work with a counselor or diabetes educator to identify strategies to manage stress and any emotional and social challenges.  Questions to ask a health care provider  · Do I need to meet with a diabetes educator?  · Do I need to meet with a dietitian?  · What number can I call if I have questions?  · When are the best times to check my blood glucose?  Where to find more information:  · American Diabetes Association: diabetes.org  · Academy of Nutrition and Dietetics: www.eatright.org  · National [place] of Diabetes and Digestive and Kidney Diseases (NIH): www.niddk.nih.gov  Summary  · A healthy meal plan will help you control your blood glucose and maintain a healthy lifestyle.  · Working with a diet and nutrition specialist (dietitian) can help you make a meal plan that is best for you.  · Keep in mind that carbohydrates (carbs) and alcohol have immediate effects on your blood glucose levels. It is important to count carbs and to use alcohol carefully.  This information is not intended to replace advice given to you by your health care provider. Make sure you discuss any questions you have with your health care provider.  Document Released: 09/14/2006 Document Revised: 11/30/2018 Document Reviewed: 01/22/2018  Elsevier Patient Education © 2020 [employer]

## 2021-05-11 ENCOUNTER — HOSPITAL ENCOUNTER (OUTPATIENT)
Dept: LAB | Facility: MEDICAL CENTER | Age: 71
End: 2021-05-11
Attending: FAMILY MEDICINE
Payer: COMMERCIAL

## 2021-05-11 DIAGNOSIS — I10 ESSENTIAL HYPERTENSION: ICD-10-CM

## 2021-05-11 DIAGNOSIS — R80.9 MICROALBUMINURIA: ICD-10-CM

## 2021-05-11 DIAGNOSIS — E78.5 DYSLIPIDEMIA: ICD-10-CM

## 2021-05-11 LAB
ALBUMIN SERPL BCP-MCNC: 4.3 G/DL (ref 3.2–4.9)
ALBUMIN/GLOB SERPL: 1.2 G/DL
ALP SERPL-CCNC: 73 U/L (ref 30–99)
ALT SERPL-CCNC: 26 U/L (ref 2–50)
ANION GAP SERPL CALC-SCNC: 11 MMOL/L (ref 7–16)
AST SERPL-CCNC: 20 U/L (ref 12–45)
BASOPHILS # BLD AUTO: 0.8 % (ref 0–1.8)
BASOPHILS # BLD: 0.05 K/UL (ref 0–0.12)
BILIRUB SERPL-MCNC: 0.6 MG/DL (ref 0.1–1.5)
BUN SERPL-MCNC: 12 MG/DL (ref 8–22)
CALCIUM SERPL-MCNC: 9.5 MG/DL (ref 8.5–10.5)
CHLORIDE SERPL-SCNC: 103 MMOL/L (ref 96–112)
CHOLEST SERPL-MCNC: 195 MG/DL (ref 100–199)
CO2 SERPL-SCNC: 24 MMOL/L (ref 20–33)
CREAT SERPL-MCNC: 0.58 MG/DL (ref 0.5–1.4)
EOSINOPHIL # BLD AUTO: 0.16 K/UL (ref 0–0.51)
EOSINOPHIL NFR BLD: 2.6 % (ref 0–6.9)
ERYTHROCYTE [DISTWIDTH] IN BLOOD BY AUTOMATED COUNT: 44.2 FL (ref 35.9–50)
EST. AVERAGE GLUCOSE BLD GHB EST-MCNC: 143 MG/DL
FASTING STATUS PATIENT QL REPORTED: NORMAL
GLOBULIN SER CALC-MCNC: 3.6 G/DL (ref 1.9–3.5)
GLUCOSE SERPL-MCNC: 106 MG/DL (ref 65–99)
HBA1C MFR BLD: 6.6 % (ref 4–5.6)
HCT VFR BLD AUTO: 48.9 % (ref 37–47)
HDLC SERPL-MCNC: 62 MG/DL
HGB BLD-MCNC: 15.3 G/DL (ref 12–16)
IMM GRANULOCYTES # BLD AUTO: 0.02 K/UL (ref 0–0.11)
IMM GRANULOCYTES NFR BLD AUTO: 0.3 % (ref 0–0.9)
LDLC SERPL CALC-MCNC: 98 MG/DL
LYMPHOCYTES # BLD AUTO: 3.09 K/UL (ref 1–4.8)
LYMPHOCYTES NFR BLD: 49.4 % (ref 22–41)
MCH RBC QN AUTO: 29.1 PG (ref 27–33)
MCHC RBC AUTO-ENTMCNC: 31.3 G/DL (ref 33.6–35)
MCV RBC AUTO: 93 FL (ref 81.4–97.8)
MONOCYTES # BLD AUTO: 0.39 K/UL (ref 0–0.85)
MONOCYTES NFR BLD AUTO: 6.2 % (ref 0–13.4)
NEUTROPHILS # BLD AUTO: 2.55 K/UL (ref 2–7.15)
NEUTROPHILS NFR BLD: 40.7 % (ref 44–72)
NRBC # BLD AUTO: 0 K/UL
NRBC BLD-RTO: 0 /100 WBC
PLATELET # BLD AUTO: 229 K/UL (ref 164–446)
PMV BLD AUTO: 10.3 FL (ref 9–12.9)
POTASSIUM SERPL-SCNC: 4.3 MMOL/L (ref 3.6–5.5)
PROT SERPL-MCNC: 7.9 G/DL (ref 6–8.2)
RBC # BLD AUTO: 5.26 M/UL (ref 4.2–5.4)
SODIUM SERPL-SCNC: 138 MMOL/L (ref 135–145)
TRIGL SERPL-MCNC: 176 MG/DL (ref 0–149)
WBC # BLD AUTO: 6.3 K/UL (ref 4.8–10.8)

## 2021-05-11 PROCEDURE — 85025 COMPLETE CBC W/AUTO DIFF WBC: CPT

## 2021-05-11 PROCEDURE — 36415 COLL VENOUS BLD VENIPUNCTURE: CPT

## 2021-05-11 PROCEDURE — 80061 LIPID PANEL: CPT

## 2021-05-11 PROCEDURE — 80053 COMPREHEN METABOLIC PANEL: CPT

## 2021-05-11 PROCEDURE — 83036 HEMOGLOBIN GLYCOSYLATED A1C: CPT

## 2021-05-17 ENCOUNTER — OFFICE VISIT (OUTPATIENT)
Dept: MEDICAL GROUP | Facility: PHYSICIAN GROUP | Age: 71
End: 2021-05-17
Payer: COMMERCIAL

## 2021-05-17 VITALS
WEIGHT: 158.07 LBS | HEIGHT: 59 IN | OXYGEN SATURATION: 96 % | BODY MASS INDEX: 31.87 KG/M2 | DIASTOLIC BLOOD PRESSURE: 60 MMHG | HEART RATE: 82 BPM | TEMPERATURE: 98.6 F | SYSTOLIC BLOOD PRESSURE: 120 MMHG

## 2021-05-17 DIAGNOSIS — E78.5 DYSLIPIDEMIA: ICD-10-CM

## 2021-05-17 DIAGNOSIS — Z23 NEED FOR SHINGLES VACCINE: ICD-10-CM

## 2021-05-17 DIAGNOSIS — E11.29 TYPE 2 DIABETES MELLITUS WITH MICROALBUMINURIA, WITHOUT LONG-TERM CURRENT USE OF INSULIN (HCC): ICD-10-CM

## 2021-05-17 DIAGNOSIS — I10 ESSENTIAL HYPERTENSION: ICD-10-CM

## 2021-05-17 DIAGNOSIS — R80.9 MICROALBUMINURIA: ICD-10-CM

## 2021-05-17 DIAGNOSIS — L84 FOOT CALLUS: ICD-10-CM

## 2021-05-17 DIAGNOSIS — R80.9 TYPE 2 DIABETES MELLITUS WITH MICROALBUMINURIA, WITHOUT LONG-TERM CURRENT USE OF INSULIN (HCC): ICD-10-CM

## 2021-05-17 PROCEDURE — 99214 OFFICE O/P EST MOD 30 MIN: CPT | Mod: 25 | Performed by: FAMILY MEDICINE

## 2021-05-17 PROCEDURE — 90471 IMMUNIZATION ADMIN: CPT | Performed by: FAMILY MEDICINE

## 2021-05-17 PROCEDURE — 90750 HZV VACC RECOMBINANT IM: CPT | Performed by: FAMILY MEDICINE

## 2021-05-17 ASSESSMENT — FIBROSIS 4 INDEX: FIB4 SCORE: 1.16

## 2021-05-17 NOTE — PROGRESS NOTES
"Subjective:      Sue Can is a 70 y.o. female who presents with Diabetes            HPI     Patient returns for follow-up of her medical problems.  She is accompanied by her sister who always comes to her appointment.    When I saw her, 3 months ago her hemoglobin A1c increased to 7.1 consistent with uncontrolled diabetes.  I had her work harder on watching her diet, exercise and losing weight.  I did not start her yet on medication.  She gained 2 pounds since her last visit.  Blood work was done before this visit.    For her microalbuminuria, she continues to take losartan.    Her blood pressure is under control on losartan and metoprolol without side effects.    She continues to take atorvastatin for dyslipidemia without myalgias.    She complains of pain on the plantar aspect of the left foot both on the medial and lateral aspects.    She needs Shingrix vaccine.  This would be her first dose.  She still needs to get her eye exam done, screening mammogram and DEXA scan.    Past medical history, past surgical history, family history reviewed-no changes    Social history reviewed-no changes    Allergies reviewed-no changes    Medications reviewed-no changes    ROS     As per HPI, the rest are negative.     Objective:     /60 (BP Location: Left arm, Patient Position: Sitting, BP Cuff Size: Adult)   Pulse 82   Temp 37 °C (98.6 °F) (Temporal)   Ht 1.499 m (4' 11\")   Wt 71.7 kg (158 lb 1.1 oz)   SpO2 96%   BMI 31.93 kg/m²      Physical Exam     Examined alert, awake, oriented, not in distress    Neck-supple, no lymphadenopathy, no thyromegaly  Lungs-clear to auscultation, no rales, no wheezes  Heart-regular rate and rhythm, no murmur  Extremities-no edema, clubbing, cyanosis, left foot exam-strong pedal pulse, positive callus of the left foot on the medial and lateral plantar areas.          Hospital Outpatient Visit on 05/11/2021   Component Date Value Ref Range Status   • WBC 05/11/2021 6.3  " 4.8 - 10.8 K/uL Final   • RBC 05/11/2021 5.26  4.20 - 5.40 M/uL Final   • Hemoglobin 05/11/2021 15.3  12.0 - 16.0 g/dL Final   • Hematocrit 05/11/2021 48.9* 37.0 - 47.0 % Final   • MCV 05/11/2021 93.0  81.4 - 97.8 fL Final   • MCH 05/11/2021 29.1  27.0 - 33.0 pg Final   • MCHC 05/11/2021 31.3* 33.6 - 35.0 g/dL Final   • RDW 05/11/2021 44.2  35.9 - 50.0 fL Final   • Platelet Count 05/11/2021 229  164 - 446 K/uL Final   • MPV 05/11/2021 10.3  9.0 - 12.9 fL Final   • Neutrophils-Polys 05/11/2021 40.70* 44.00 - 72.00 % Final   • Lymphocytes 05/11/2021 49.40* 22.00 - 41.00 % Final   • Monocytes 05/11/2021 6.20  0.00 - 13.40 % Final   • Eosinophils 05/11/2021 2.60  0.00 - 6.90 % Final   • Basophils 05/11/2021 0.80  0.00 - 1.80 % Final   • Immature Granulocytes 05/11/2021 0.30  0.00 - 0.90 % Final   • Nucleated RBC 05/11/2021 0.00  /100 WBC Final   • Neutrophils (Absolute) 05/11/2021 2.55  2.00 - 7.15 K/uL Final    Includes immature neutrophils, if present.   • Lymphs (Absolute) 05/11/2021 3.09  1.00 - 4.80 K/uL Final   • Monos (Absolute) 05/11/2021 0.39  0.00 - 0.85 K/uL Final   • Eos (Absolute) 05/11/2021 0.16  0.00 - 0.51 K/uL Final   • Baso (Absolute) 05/11/2021 0.05  0.00 - 0.12 K/uL Final   • Immature Granulocytes (abs) 05/11/2021 0.02  0.00 - 0.11 K/uL Final   • NRBC (Absolute) 05/11/2021 0.00  K/uL Final   • Glycohemoglobin 05/11/2021 6.6* 4.0 - 5.6 % Final    Comment: Increased risk for diabetes:  5.7 -6.4%  Diabetes:  >6.4%  Glycemic control for adults with diabetes:  <7.0%  The above interpretations are per ADA guidelines.  Diagnosis  of diabetes mellitus on the basis of elevated Hemoglobin A1c  should be confirmed by repeating the Hb A1c test.     • Est Avg Glucose 05/11/2021 143  mg/dL Final    Comment: The eAG calculation is based on the A1c-Derived Daily Glucose  (ADAG) study.  See the ADA's website for additional information.     • Sodium 05/11/2021 138  135 - 145 mmol/L Final   • Potassium 05/11/2021 4.3   3.6 - 5.5 mmol/L Final   • Chloride 05/11/2021 103  96 - 112 mmol/L Final   • Co2 05/11/2021 24  20 - 33 mmol/L Final   • Anion Gap 05/11/2021 11.0  7.0 - 16.0 Final   • Glucose 05/11/2021 106* 65 - 99 mg/dL Final   • Bun 05/11/2021 12  8 - 22 mg/dL Final   • Creatinine 05/11/2021 0.58  0.50 - 1.40 mg/dL Final   • Calcium 05/11/2021 9.5  8.5 - 10.5 mg/dL Final   • AST(SGOT) 05/11/2021 20  12 - 45 U/L Final   • ALT(SGPT) 05/11/2021 26  2 - 50 U/L Final   • Alkaline Phosphatase 05/11/2021 73  30 - 99 U/L Final   • Total Bilirubin 05/11/2021 0.6  0.1 - 1.5 mg/dL Final   • Albumin 05/11/2021 4.3  3.2 - 4.9 g/dL Final   • Total Protein 05/11/2021 7.9  6.0 - 8.2 g/dL Final   • Globulin 05/11/2021 3.6* 1.9 - 3.5 g/dL Final   • A-G Ratio 05/11/2021 1.2  g/dL Final   • Cholesterol,Tot 05/11/2021 195  100 - 199 mg/dL Final   • Triglycerides 05/11/2021 176* 0 - 149 mg/dL Final   • HDL 05/11/2021 62  >=40 mg/dL Final   • LDL 05/11/2021 98  <100 mg/dL Final   • Fasting Status 05/11/2021 Fasting   Final   • GFR If  05/11/2021 >60  >60 mL/min/1.73 m 2 Final   • GFR If Non  05/11/2021 >60  >60 mL/min/1.73 m 2 Final            Assessment/Plan:        1. Type 2 diabetes mellitus with microalbuminuria, without long-term current use of insulin (HCC)  Hemoglobin A1c decreased from 7.1-6.6 which is now under control.  Continue to watch her diet closely.  Advised to work hard on exercise and weight loss.  Recheck blood work in 6 months.  We do not need to start him on medication.  - Lipid Profile; Future  - Comp Metabolic Panel; Future  - HEMOGLOBIN A1C; Future    2. Microalbuminuria  Continue losartan.  - Lipid Profile; Future  - Comp Metabolic Panel; Future  - HEMOGLOBIN A1C; Future    3. Dyslipidemia  All at goal except triglycerides are slightly high but advised work hard on avoidance of sweets and decreasing the carbs.  - Lipid Profile; Future  - Comp Metabolic Panel; Future  - HEMOGLOBIN A1C;  Future    4. Essential hypertension  Controlled on blood pressure medications.  - Lipid Profile; Future  - Comp Metabolic Panel; Future  - HEMOGLOBIN A1C; Future    5. Foot callus  Advised to wear wide shoes and to get callus pads to protect the calluses and relieve the pressure on these areas.    6. Need for shingles vaccine  First dose of Shingrix given.  Discussed potential side effects and how she can manage them.  We will give the second dose next visit.  - Shingles Vaccine (Shingrix)    Follow-up in 6 months.      Please note that this dictation was created using voice recognition software. I have worked with consultants from the vendor as well as technical experts from [employer] [facility] to optimize the interface. I have made every reasonable attempt to correct obvious errors, but I expect that there are errors of grammar and possibly content I did not discover before finalizing the note.

## 2021-08-09 ENCOUNTER — OFFICE VISIT (OUTPATIENT)
Dept: MEDICAL GROUP | Facility: PHYSICIAN GROUP | Age: 71
End: 2021-08-09
Payer: COMMERCIAL

## 2021-08-09 VITALS
WEIGHT: 160.5 LBS | DIASTOLIC BLOOD PRESSURE: 70 MMHG | BODY MASS INDEX: 32.36 KG/M2 | TEMPERATURE: 99.3 F | HEIGHT: 59 IN | OXYGEN SATURATION: 95 % | HEART RATE: 96 BPM | SYSTOLIC BLOOD PRESSURE: 120 MMHG

## 2021-08-09 DIAGNOSIS — M25.562 CHRONIC PAIN OF BOTH KNEES: ICD-10-CM

## 2021-08-09 DIAGNOSIS — M25.561 CHRONIC PAIN OF BOTH KNEES: ICD-10-CM

## 2021-08-09 DIAGNOSIS — G89.29 CHRONIC PAIN OF BOTH KNEES: ICD-10-CM

## 2021-08-09 DIAGNOSIS — M79.89 LEFT LEG SWELLING: ICD-10-CM

## 2021-08-09 PROCEDURE — 99213 OFFICE O/P EST LOW 20 MIN: CPT | Performed by: FAMILY MEDICINE

## 2021-08-09 ASSESSMENT — FIBROSIS 4 INDEX: FIB4 SCORE: 1.18

## 2021-08-10 NOTE — PROGRESS NOTES
"Subjective:      Sue Can is a 71 y.o. female who presents with Leg Swelling            HPI     Patient is here because of left leg swelling.  According to the sister who always comes to her appointment, patient was wearing bilateral knee sleeve to manage bilateral knee pain.  Apparently she had this knee sleeve very tight on the left knee and this may have caused the significant swelling because when she removed the brace yesterday the swelling resolved.  According to the sister the swelling was from the whole left foot to the ankle and the whole leg.  There was no pain or discomfort.  No episode of immobility or trauma.    She has chronic bilateral knee pain.  We have done x-rays of both knees in 2018 that showed osteoarthritis most severe in the medial compartment right knee and left knee mild tricompartmental osteoarthritis.    Past medical history, past surgical history, family history reviewed-no changes    Social history reviewed-no changes    Allergies reviewed-no changes    Medications reviewed-no changes    ROS     As per HPI, the rest are negative.       Objective:     /70 (BP Location: Left arm, Patient Position: Sitting, BP Cuff Size: Adult)   Pulse 96   Temp 37.4 °C (99.3 °F) (Temporal)   Ht 1.499 m (4' 11\")   Wt 72.8 kg (160 lb 7.9 oz)   SpO2 95%   BMI 32.42 kg/m²      Physical Exam     Examined alert, awake, oriented, not in distress    Neck-supple, no lymphadenopathy, no thyromegaly  Lungs-clear to auscultation, no rales, no wheezes  Heart-regular rate and rhythm, no murmur  Extremities-no edema, clubbing, cyanosis, strong pedal pulses both feet, no evidence of any edema of the feet, ankles, legs, no calf tenderness both legs, no skin changes, she has intact dorsiflexion and plantarflexion of both feet, knee exam-no effusion, full range of movement on passive flexion and extension, there is mild clicking left knee on passive flexion and extension                 "   Assessment/Plan:        1. Left leg swelling  The swelling has completely resolved.  This was most likely from the knee sleeve being so tight causing constriction.  Advised to make sure the knee sleeve is not too tight when she is using it.  No further intervention needed at this time.    2. Chronic pain of both knees  We will do updated x-rays of both knees because of worsening of her pain recently.  Further recommendation will depend on results.  We talked briefly options of treatment including physical therapy, knee injection, referral to orthopedist.  She does not want any injection or referral to orthopedist for consideration for surgery if x-ray would show significant osteoarthritis requiring knee replacement.  We may be able to try anti-inflammatories and physical therapy depending on results.  - DX-KNEE 3 VIEWS RIGHT; Future  - DX-KNEE 3 VIEWS LEFT; Future    Keep appointment as scheduled in November.      Please note that this dictation was created using voice recognition software. I have worked with consultants from the vendor as well as technical experts from [employer] to optimize the interface. I have made every reasonable attempt to correct obvious errors, but I expect that there are errors of grammar and possibly content I did not discover before finalizing the note.

## 2021-08-19 ENCOUNTER — HOSPITAL ENCOUNTER (OUTPATIENT)
Dept: RADIOLOGY | Facility: MEDICAL CENTER | Age: 71
End: 2021-08-19
Attending: FAMILY MEDICINE
Payer: COMMERCIAL

## 2021-08-19 DIAGNOSIS — M25.562 CHRONIC PAIN OF BOTH KNEES: ICD-10-CM

## 2021-08-19 DIAGNOSIS — G89.29 CHRONIC PAIN OF BOTH KNEES: ICD-10-CM

## 2021-08-19 DIAGNOSIS — M25.561 CHRONIC PAIN OF BOTH KNEES: ICD-10-CM

## 2021-08-19 PROCEDURE — 73562 X-RAY EXAM OF KNEE 3: CPT | Mod: RT

## 2021-08-19 PROCEDURE — 73562 X-RAY EXAM OF KNEE 3: CPT | Mod: LT

## 2021-11-15 ENCOUNTER — APPOINTMENT (OUTPATIENT)
Dept: MEDICAL GROUP | Facility: PHYSICIAN GROUP | Age: 71
End: 2021-11-15
Payer: MEDICARE

## 2021-11-18 ENCOUNTER — HOSPITAL ENCOUNTER (OUTPATIENT)
Dept: LAB | Facility: MEDICAL CENTER | Age: 71
End: 2021-11-18
Attending: FAMILY MEDICINE
Payer: COMMERCIAL

## 2021-11-18 DIAGNOSIS — E78.5 DYSLIPIDEMIA: ICD-10-CM

## 2021-11-18 DIAGNOSIS — R80.9 MICROALBUMINURIA: ICD-10-CM

## 2021-11-18 DIAGNOSIS — R80.9 TYPE 2 DIABETES MELLITUS WITH MICROALBUMINURIA, WITHOUT LONG-TERM CURRENT USE OF INSULIN (HCC): ICD-10-CM

## 2021-11-18 DIAGNOSIS — I10 ESSENTIAL HYPERTENSION: ICD-10-CM

## 2021-11-18 DIAGNOSIS — E11.29 TYPE 2 DIABETES MELLITUS WITH MICROALBUMINURIA, WITHOUT LONG-TERM CURRENT USE OF INSULIN (HCC): ICD-10-CM

## 2021-11-18 LAB
ALBUMIN SERPL BCP-MCNC: 4.1 G/DL (ref 3.2–4.9)
ALBUMIN/GLOB SERPL: 1.1 G/DL
ALP SERPL-CCNC: 71 U/L (ref 30–99)
ALT SERPL-CCNC: 16 U/L (ref 2–50)
ANION GAP SERPL CALC-SCNC: 12 MMOL/L (ref 7–16)
AST SERPL-CCNC: 19 U/L (ref 12–45)
BILIRUB SERPL-MCNC: 0.9 MG/DL (ref 0.1–1.5)
BUN SERPL-MCNC: 14 MG/DL (ref 8–22)
CALCIUM SERPL-MCNC: 9.6 MG/DL (ref 8.5–10.5)
CHLORIDE SERPL-SCNC: 106 MMOL/L (ref 96–112)
CHOLEST SERPL-MCNC: 202 MG/DL (ref 100–199)
CO2 SERPL-SCNC: 23 MMOL/L (ref 20–33)
CREAT SERPL-MCNC: 0.55 MG/DL (ref 0.5–1.4)
EST. AVERAGE GLUCOSE BLD GHB EST-MCNC: 151 MG/DL
GLOBULIN SER CALC-MCNC: 3.9 G/DL (ref 1.9–3.5)
GLUCOSE SERPL-MCNC: 106 MG/DL (ref 65–99)
HBA1C MFR BLD: 6.9 % (ref 4–5.6)
HDLC SERPL-MCNC: 63 MG/DL
LDLC SERPL CALC-MCNC: 116 MG/DL
POTASSIUM SERPL-SCNC: 4 MMOL/L (ref 3.6–5.5)
PROT SERPL-MCNC: 8 G/DL (ref 6–8.2)
SODIUM SERPL-SCNC: 141 MMOL/L (ref 135–145)
TRIGL SERPL-MCNC: 116 MG/DL (ref 0–149)

## 2021-11-18 PROCEDURE — 80053 COMPREHEN METABOLIC PANEL: CPT

## 2021-11-18 PROCEDURE — 80061 LIPID PANEL: CPT

## 2021-11-18 PROCEDURE — 83036 HEMOGLOBIN GLYCOSYLATED A1C: CPT

## 2021-11-18 PROCEDURE — 36415 COLL VENOUS BLD VENIPUNCTURE: CPT

## 2021-11-29 DIAGNOSIS — E78.5 DYSLIPIDEMIA: ICD-10-CM

## 2021-11-29 RX ORDER — ATORVASTATIN CALCIUM 40 MG/1
40 TABLET, FILM COATED ORAL EVERY EVENING
Qty: 90 TABLET | Refills: 1 | Status: SHIPPED | OUTPATIENT
Start: 2021-11-29 | End: 2022-06-30 | Stop reason: SDUPTHER

## 2021-11-30 ENCOUNTER — OCCUPATIONAL MEDICINE (OUTPATIENT)
Dept: URGENT CARE | Facility: PHYSICIAN GROUP | Age: 71
End: 2021-11-30
Payer: COMMERCIAL

## 2021-11-30 ENCOUNTER — APPOINTMENT (OUTPATIENT)
Dept: RADIOLOGY | Facility: IMAGING CENTER | Age: 71
End: 2021-11-30
Attending: FAMILY MEDICINE
Payer: COMMERCIAL

## 2021-11-30 VITALS
HEART RATE: 118 BPM | OXYGEN SATURATION: 96 % | WEIGHT: 157 LBS | RESPIRATION RATE: 16 BRPM | HEIGHT: 59 IN | SYSTOLIC BLOOD PRESSURE: 122 MMHG | BODY MASS INDEX: 31.65 KG/M2 | TEMPERATURE: 99.9 F | DIASTOLIC BLOOD PRESSURE: 74 MMHG

## 2021-11-30 DIAGNOSIS — S90.02XA CONTUSION OF LEFT ANKLE, INITIAL ENCOUNTER: ICD-10-CM

## 2021-11-30 DIAGNOSIS — Z02.1 PRE-EMPLOYMENT DRUG SCREENING: ICD-10-CM

## 2021-11-30 DIAGNOSIS — S90.01XA CONTUSION OF RIGHT ANKLE, INITIAL ENCOUNTER: ICD-10-CM

## 2021-11-30 LAB
AMP AMPHETAMINE: NORMAL
BREATH ALCOHOL COMMENT: NORMAL
COC COCAINE: NORMAL
INT CON NEG: NORMAL
INT CON POS: NORMAL
MET METHAMPHETAMINES: NORMAL
OPI OPIATES: NORMAL
PCP PHENCYCLIDINE: NORMAL
POC BREATHALIZER: 0 PERCENT (ref 0–0.01)
POC DRUG COMMENT 753798-OCCUPATIONAL HEALTH: NEGATIVE
THC: NORMAL

## 2021-11-30 PROCEDURE — 82075 ASSAY OF BREATH ETHANOL: CPT | Performed by: FAMILY MEDICINE

## 2021-11-30 PROCEDURE — 99204 OFFICE O/P NEW MOD 45 MIN: CPT | Performed by: FAMILY MEDICINE

## 2021-11-30 PROCEDURE — 73630 X-RAY EXAM OF FOOT: CPT | Mod: TC,RT | Performed by: FAMILY MEDICINE

## 2021-11-30 PROCEDURE — 73610 X-RAY EXAM OF ANKLE: CPT | Mod: TC,RT | Performed by: FAMILY MEDICINE

## 2021-11-30 PROCEDURE — 80305 DRUG TEST PRSMV DIR OPT OBS: CPT | Performed by: FAMILY MEDICINE

## 2021-11-30 ASSESSMENT — FIBROSIS 4 INDEX: FIB4 SCORE: 1.43

## 2021-11-30 NOTE — LETTER
"EMPLOYEE’S CLAIM FOR COMPENSATION/ REPORT OF INITIAL TREATMENT  FORM C-4    EMPLOYEE’S CLAIM - PROVIDE ALL INFORMATION REQUESTED   First Name  Sue Last Name  Juan José Birthdate                    1950                Sex  female Claim Number (Insurer’s Use Only)    Home Address  [address] Age  71 y.o. Height  1.499 m (4' 11\") Weight  71.2 kg (157 lb) [facility]     [facility] Zip  [address] Telephone  [phone] (home)    Mailing Address  [address] [facility] Zip  [address] Primary Language Spoken  Tagalog    Insurer   Third-Party       Employee's Occupation (Job Title) When Injury or Occupational Disease Occurred  [employer]    Employer's Name/Company Name  [employer]  Telephone  [phone]    Office Mail Address (Number and Street)   [address]  [place]  [address]    Date of Injury  11/27/2021               Hours Injury  7:00 AM Date Employer Notified  11/30/2021 Last Day of Work after Injury     or Occupational Disease  11/28/2021 Supervisor to Whom Injury     Reported  Fer Darby   Address or Location of Accident (if applicable)  [[employer]   What were you doing at the time of accident? (if applicable)  [place] chair in [place]    How did this injury or occupational disease occur? (Be specific an answer in detail. Use additional sheet if necessary)  I hit the bump chair in my foot   If you believe that you have an occupational disease, when did you first have knowledge of the disability and it relationship to your employment?  N/A Witnesses to the Accident  I'm alone nobody to see me      Nature of Injury or Occupational Disease  Contusion  Part(s) of Body Injured or Affected  Foot (L), N/A, N/A    I certify that the above is true and correct to the best of my knowledge and that I have provided this information in order to obtain the benefits of [place]’s " [place]  I hereby authorize any physician, chiropractor, surgeon, practitioner, or other person, any hospital, including [facility] or [facility], any medical service organization, any insurance company, or other institution or organization to release to each other, any medical or other information, including benefits paid or payable, pertinent to this injury or disease, except information relative to diagnosis, treatment and/or counseling for AIDS, psychological conditions, alcohol or controlled substances, for which I must give specific authorization.  A Photostat of this authorization shall be as valid as the original.     Date 11/30/2021   Place [facility]  Employee’s Original or  *Electronic Signature   THIS REPORT MUST BE COMPLETED AND MAILED WITHIN 3 WORKING DAYS OF TREATMENT   Place  [facility] [place]  Name of Facility  [facility]   Date  11/30/2021 Diagnosis and Description of Injury or Occupational Disease  (S90.01XA) Contusion of right ankle, initial encounter Is there evidence the injured employee was under the influence of alcohol and/or another controlled substance at the time of accident?  ? No ? Yes (if yes, please explain)    Hour  10:09 AM   The encounter diagnosis was Contusion of right ankle, initial encounter. No   Treatment  Contusion of rt ankle, initial encounter  X-rays were personally reviewed by myself.   There is no fracture   otc tylenol, prn  Restrictions per D39    F/u one wk    Have you advised the patient to remain off work five days or     more?    X-Ray Findings      ? Yes Indicate dates:   From   To      From information given by the employee, together with medical evidence, can        you directly connect this injury or occupational disease as job incurred?  Yes ? No If no, is the injured employee capable of:  ? full duty  No ? modified duty  Yes  "  Is additional medical care by a physician indicated?  Yes If Modified Duty, Specify any Limitations / Restrictions  Restrictions per D39     Do you know of any previous injury or disease contributing to this condition or occupational disease?  ? Yes ? No (Explain if yes)                          No   Date  11/30/2021 Print Health Care Provider's   Edy Shields M.D. I certify the employer’s copy of  this form was mailed on:   Address  [address] [place] [address]. [address] Insurer’s Use Only     [facility] Zip  [address]    Provider’s Tax ID Number  [id number] Telephone  Dept: [phone]             Health Care Provider’s Original or Electronic Signature  e-EDY Singleton M.D. Degree (MD,DO, DC,PAJoaquinC,APRN)   MD      * Complete and attach Release of Information (Form C-4A) when injured employee signs C-4 Form electronically  ORIGINAL - TREATING HEALTHCARE PROVIDER PAGE 2 - INSURER/TPA PAGE 3 - EMPLOYER PAGE 4 - EMPLOYEE             Form C-4 (rev.08/21)           BRIEF DESCRIPTION OF RIGHTS AND BENEFITS  (Pursuant to [place] 616[place])    Notice of Injury or Occupational Disease (Incident Report Form C-1): If an injury or occupational disease (OD) arises out of and in the course of employment, you must provide written notice to your employer as soon as practicable, but no later than 7 days after the accident or OD. Your employer shall maintain a sufficient supply of the required forms.    Claim for Compensation (Form C-4): If medical treatment is sought, the form C-4 is available at the place of initial treatment. A completed \"Claim for Compensation\" (Form C-4) must be filed within 90 days after an accident or OD. The treating physician or chiropractor must, within 3 working days after treatment, complete and mail to the employer, the employer's insurer and third-party , the Claim for Compensation.    Medical Treatment: If you require medical treatment for your on-the-job injury or OD, you may " be required to select a physician or chiropractor from a list provided by your workers’ compensation insurer, if it has contracted with an Organization for Managed Care (MCO) or Preferred Provider Organization (PPO) or providers of health care. If your employer has not entered into a contract with an MCO or PPO, you may select a physician or chiropractor from the Panel of Physicians and Chiropractors. Any medical costs related to your industrial injury or OD will be paid by your insurer.    Temporary Total Disability (TTD): If your doctor has certified that you are unable to work for a period of at least 5 consecutive days, or 5 cumulative days in a 20-day period, or places restrictions on you that your employer does not accommodate, you may be entitled to TTD compensation.    Temporary Partial Disability (TPD): If the wage you receive upon reemployment is less than the compensation for TTD to which you are entitled, the insurer may be required to pay you TPD compensation to make up the difference. TPD can only be paid for a maximum of 24 months.    Permanent Partial Disability (PPD): When your medical condition is stable and there is an indication of a PPD as a result of your injury or OD, within 30 days, your insurer must arrange for an evaluation by a rating physician or chiropractor to determine the degree of your PPD. The amount of your PPD award depends on the date of injury, the results of the PPD evaluation, your age and wage.    Permanent Total Disability (PTD): If you are medically certified by a treating physician or chiropractor as permanently and totally disabled and have been granted a PTD status by your insurer, you are entitled to receive monthly benefits not to exceed 66 2/3% of your average monthly wage. The amount of your PTD payments is subject to reduction if you previously received a lump-sum PPD award.    Vocational Rehabilitation Services: You may be eligible for vocational rehabilitation  services if you are unable to return to the job due to a permanent physical impairment or permanent restrictions as a result of your injury or occupational disease.    Transportation and Per Pradeep Reimbursement: You may be eligible for travel expenses and per pradeep associated with medical treatment.    Reopening: You may be able to reopen your claim if your condition worsens after claim closure.     Appeal Process: If you disagree with a written determination issued by the insurer or the insurer does not respond to your request, you may appeal to the Department of Administration, , by following the instructions contained in your determination letter. You must appeal the determination within 70 days from the date of the determination letter at [address], [address], [place], [place] [address], or [address] [facility] [address], [place], [place] [address]. If you disagree with the  decision, you may appeal to the Department of Administration, . You must file your appeal within 30 days from the date of the  decision letter at [address], [address], [place], [place] [address], or [address] [facility] [address], [place] [address]. If you disagree with a decision of an , you may file a petition for judicial review with the District Court. You must do so within 30 days of the Appeal Officer’s decision. You may be represented by an  at your own expense or you may contact the [facility] for possible representation.    [place]  for Injured Workers (NAIW): If you disagree with a  decision, you may request that NAIW represent you without charge at an  Hearing. For information regarding denial of benefits, you may contact the [facility] at: [address], [address], [place] [address], [phone], or [address] [facility] [address] [address], [phone]    To File a  Complaint with the Division: If you wish to file a complaint with the  of the Division of Industrial Relations (DIR),  please contact the Workers’ Compensation Section, [address], [address], [place], [place] [address], telephone [phone], or [address], [address], [place], [place] [address], telephone [phone].    For assistance with Workers’ Compensation Issues: You may contact the [facility] Office for Consumer Health Assistance, [address], [address], [place] [address], Toll Free 1-914.804.4905, Web site: http://[facility].[place].gov/Programs/FÉLIX E-mail: félix@[facility].nv.gov              __________________________________________________________________                                    _________________            Employee Name / Signature                                                                                                                            Date                                                                                                                                                                                                                              D-2 (rev. 10/20)

## 2021-11-30 NOTE — PROGRESS NOTES
"Chief Complaint   Patient presents with   • Foot Injury     WC, R foot/heal injury, swelling, painful to put wait aching, DOI 11/27/2021          DOI:  27 NOVEMBER        Pt accidentally smashed outside of rt foot and ankle against heavy metal chair at work.   She continues to c/o dull achy pain and soreness, especially with wtbearing.                   Pertinent negatives include no  loss of motion, muscle weakness, numbness or tingling. The symptoms are aggravated by weight bearing and palpation.          Social History     Tobacco Use   • Smoking status: Never Smoker   • Smokeless tobacco: Never Used   Vaping Use   • Vaping Use: Never used   Substance Use Topics   • Alcohol use: Yes     Alcohol/week: 0.0 oz     Comment: occasional   • Drug use: No         Past Medical History:   Diagnosis Date   • GOUT    • Hyperlipidemia    • Hypertension    • Microscopic hematuria     negative workup including CT and cytology, cystoscopy.- mild trigonitis         Family hx was reviewed - no pertinent past family hx        Review of Systems   Constitutional: Negative for fever, chills and weight loss.   HENT - denies cough, ear pain, congestion, sore throat  Eyes: denies vision changes, discharge  Respiratory: Negative for cough and wheezing.    Cardiovascular: Negative for chest pain or PND.   Gastrointestinal:  No abdominal pain,  nausea, vomiting, diarrhea.  Negative for  blood in stool.    - no discharge, dysuria, frequency.      Neurological: Negative for dizziness and headaches.   musculoskeletal - denies myalgias, calf pain  Psych - denies anxiety/depression/mood changes.  Skin: no itching or rash  All other systems reviewed and are negative.           Objective:     /74 (BP Location: Left arm, Patient Position: Sitting, BP Cuff Size: Large adult)   Pulse (!) 118   Temp 37.7 °C (99.9 °F) (Temporal)   Resp 16   Ht 1.499 m (4' 11\")   Wt 71.2 kg (157 lb)   SpO2 96%         Physical Exam   Constitutional: pt is " oriented to person, place, and time. Pt appears well-developed. No distress.   HENT:   Head: Normocephalic and atraumatic.   Eyes: Conjunctivae are normal.   Cardiovascular: Normal rate and regular rhythm.    Pulmonary/Chest: Effort normal and breath sounds normal.   Musculoskeletal:        Rt ankle: pt exhibits no edema or ecchymosis.   +  lateral malleolus tenderness noted.        Achilles tendon normal.        Left foot: there is no tenderness to palpation over the foot.    There is normal range of motion, normal capillary refill and no crepitus.   Neurological: pt is alert and oriented to person, place, and time. No cranial nerve deficit.   Skin: Skin is warm. Pt is not diaphoretic. No erythema.   Psychiatric: His behavior is normal.   Nursing note and vitals reviewed.      Assessment & Plan        1. Contusion of rt ankle, initial encounter  X-rays were personally reviewed by myself.   There is no fracture   otc tylenol, prn  Restrictions per D39    F/u one wk

## 2021-11-30 NOTE — LETTER
[facility]  [address] [place] [address]. [address] - [place] [address]  Phone:  [phone] - Fax:  [phone]   [facility] Progress Report and Disability Certification  Date of Service: 11/30/2021   No Show:  No  Date / Time of Next Visit: 12/8/2021@9:00AM   Claim Information   Patient Name: Sue Can  Claim Number:     Employer: [employer]  Date of Injury: 11/27/2021     Insurer / TPA:    ID / SSN:     Occupation: [employer]  Diagnosis: The encounter diagnosis was Contusion of right ankle, initial encounter.    Medical Information   Related to Industrial Injury? Yes    Subjective Complaints:    DOI:  27 NOVEMBER        Pt accidentally smashed outside of rt foot and ankle against heavy metal chair at work.   She continues to c/o dull achy pain and soreness, especially with wtbearing.                   Pertinent negatives include no  loss of motion, muscle weakness, numbness or tingling. The symptoms are aggravated by weight bearing and palpation.       Objective Findings:      Rt ankle: pt exhibits no edema or ecchymosis.   +  lateral malleolus tenderness noted.        Achilles tendon normal.        Left foot: there is no tenderness to palpation over the foot.    There is normal range of motion, normal capillary refill and no crepitus.    Pre-Existing Condition(s):     Assessment:   Initial Visit    Status: Additional Care Required  Permanent Disability:No    Plan: Medication    Diagnostics: X-ray    Comments:       Disability Information   Status: Released to Restricted Duty    From:  11/30/2021  Through: 12/7/2021 Restrictions are: Temporary   Physical Restrictions   Sitting:    Standing:  < or = to 1 hr/day Stooping:    Bending:      Squatting:    Walking:  < or = to 1 hr/day Climbing:    Pushing:      Pulling:    Other:    Reaching Above Shoulder (L):   Reaching Above Shoulder (R):       Reaching Below Shoulder (L):    Reaching Below Shoulder (R):      Not  to exceed Weight Limits   Carrying(hrs):   Weight Limit(lb): < or = to 10 pounds Lifting(hrs):   Weight  Limit(lb): < or = to 10 pounds   Comments: Contusion of rt ankle, initial encounter  X-rays were personally reviewed by myself.   There is no fracture   otc tylenol, prn  Restrictions per D39    F/u one wk      Repetitive Actions   Hands: i.e. Fine Manipulations from Grasping:     Feet: i.e. Operating Foot Controls: 0 hrs/day  Comments:rt   Driving / Operate Machinery:     Health Care Provider’s Original or Electronic Signature  Edy Shields M.D. Health Care Provider’s Original or Electronic Signature    Richard Hernandez MD         Clinic Name / Location: [address]  [place] [address] Clinic Phone Number: Dept: [phone]   Appointment Time: 9:55 Am Visit Start Time: 10:09 AM   Check-In Time:  10:06 Am Visit Discharge Time:  12:07PM   Original-Treating Physician or Chiropractor    Page 2-Insurer/TPA    Page 3-Employer    Page 4-Employee

## 2021-12-08 ENCOUNTER — OCCUPATIONAL MEDICINE (OUTPATIENT)
Dept: URGENT CARE | Facility: PHYSICIAN GROUP | Age: 71
End: 2021-12-08
Payer: COMMERCIAL

## 2021-12-08 VITALS
HEART RATE: 94 BPM | WEIGHT: 160 LBS | SYSTOLIC BLOOD PRESSURE: 134 MMHG | DIASTOLIC BLOOD PRESSURE: 68 MMHG | HEIGHT: 59 IN | TEMPERATURE: 99.5 F | OXYGEN SATURATION: 97 % | BODY MASS INDEX: 32.25 KG/M2 | RESPIRATION RATE: 18 BRPM

## 2021-12-08 DIAGNOSIS — S90.01XD CONTUSION OF RIGHT ANKLE, SUBSEQUENT ENCOUNTER: ICD-10-CM

## 2021-12-08 PROCEDURE — 99213 OFFICE O/P EST LOW 20 MIN: CPT | Performed by: PHYSICIAN ASSISTANT

## 2021-12-08 ASSESSMENT — FIBROSIS 4 INDEX: FIB4 SCORE: 1.43

## 2021-12-08 ASSESSMENT — ENCOUNTER SYMPTOMS
TINGLING: 0
MYALGIAS: 0
WEAKNESS: 0

## 2021-12-08 NOTE — LETTER
[facility]  [address] [place] [address]. [address] - [place] [address]  Phone:  [phone] - Fax:  [phone]   [facility] Progress Report and Disability Certification  Date of Service: 12/8/2021   No Show:  No  Date / Time of Next Visit:     Claim Information   Patient Name: Sue Can  Claim Number:     Employer: [employer]  Date of Injury: 11/27/2021     Insurer / TPA: [facility]  ID / SSN:     Occupation: [employer]  Diagnosis: The encounter diagnosis was Contusion of right ankle, subsequent encounter.    Medical Information   Related to Industrial Injury? Yes    Subjective Complaints:  HPI: From 11/30/21  DOI:  27 NOVEMBER    Pt accidentally smashed outside of rt foot and ankle against heavy metal chair at work.   She continues to c/o dull achy pain and soreness, especially with wtbearing.        HPI: Today 12/8/21  This is a very pleasant 71-year-old female presenting to the clinic for follow-up regarding a work-related injury.  Patient sustained a contusion to her right ankle.  Previous x-rays were reviewed in clinic no evidence of fracture or bony abnormality.  Patient states her symptoms have fully improved.  She is no longer in any pain.  Has no difficulty ambulating.  Not currently taking any OTC medication for pain.  No numbness or tingling distally.  Requesting release back to full duty.   Objective Findings: Constitutional: Pt is oriented to person, place, and time.  Appears well-developed and well-nourished. No distress.   Eyes: Conjunctivae are normal.   Cardiovascular: Normal rate.    Pulmonary/Chest: Effort normal.   Musculoskeletal: Right foot and ankle: There is no obvious deformity, discoloration or effusion.  No bony tenderness to palpation.  Patient has full and pain-free ankle range of motion.  Sensation intact distally.  Normal gait.  Neurological: Pt is alert and oriented to person, place, and time. Coordination normal.   Skin: Skin is warm.  Pt is not diaphoretic. No erythema.   Psychiatric: Pt has a normal mood and affect.  Behavior is normal.      Pre-Existing Condition(s):     Assessment:   Condition Improved    Status: Discharged /  MMI  Permanent Disability:No    Plan:      Diagnostics:      Comments:       Disability Information   Status: Released to Full Duty    From:  12/8/2021  Through:   Restrictions are:     Physical Restrictions   Sitting:    Standing:    Stooping:    Bending:      Squatting:    Walking:    Climbing:    Pushing:      Pulling:    Other:    Reaching Above Shoulder (L):   Reaching Above Shoulder (R):       Reaching Below Shoulder (L):    Reaching Below Shoulder (R):      Not to exceed Weight Limits   Carrying(hrs):   Weight Limit(lb):   Lifting(hrs):   Weight  Limit(lb):     Comments: Patient symptoms have fully improved since last visit.  May return to full duty without restrictions at this time.  Please call with any questions or concerns.  Return to the clinic for any change or worsening of symptoms.    Discharge MMI.      Repetitive Actions   Hands: i.e. Fine Manipulations from Grasping:     Feet: i.e. Operating Foot Controls:     Driving / Operate Machinery:     Health Care Provider’s Original or Electronic Signature  John Lozoya P.A.-C. Health Care Provider’s Original or Electronic Signature    Richard Hernandez MD         Clinic Name / Location: [address]  [place] [address] Clinic Phone Number: Dept: [phone]   Appointment Time: 9:00 Am Visit Start Time: 9:08 AM   Check-In Time:  8:31 Am Visit Discharge Time: 9:34AM   Original-Treating Physician or Chiropractor    Page 2-Insurer/TPA    Page 3-Employer    Page 4-Employee

## 2021-12-08 NOTE — PROGRESS NOTES
"Subjective     Sue Can is a 71 y.o. female who presents with Work-Related Injury (DOI 11/27/2021  right foot injury,pt is doing better.)      HPI: From 11/30/21  DOI:  27 NOVEMBER    Pt accidentally smashed outside of rt foot and ankle against heavy metal chair at work.   She continues to c/o dull achy pain and soreness, especially with wtbearing.        HPI: Today 12/8/21  This is a very pleasant 71-year-old female presenting to the clinic for follow-up regarding a work-related injury.  Patient sustained a contusion to her right ankle.  Previous x-rays were reviewed in clinic no evidence of fracture or bony abnormality.  Patient states her symptoms have fully improved.  She is no longer in any pain.  Has no difficulty ambulating.  Not currently taking any OTC medication for pain.  No numbness or tingling distally.  Requesting release back to full duty.       Review of Systems   Musculoskeletal: Negative for joint pain and myalgias.   Neurological: Negative for tingling and weakness.              Objective     /68 (BP Location: Right arm, Patient Position: Sitting, BP Cuff Size: Adult)   Pulse 94   Temp 37.5 °C (99.5 °F) (Temporal)   Resp 18   Ht 1.499 m (4' 11\")   Wt 72.6 kg (160 lb)   SpO2 97%   BMI 32.32 kg/m²      Physical Exam    Constitutional: Pt is oriented to person, place, and time.  Appears well-developed and well-nourished. No distress.   Eyes: Conjunctivae are normal.   Cardiovascular: Normal rate.    Pulmonary/Chest: Effort normal.   Musculoskeletal: Right foot and ankle: There is no obvious deformity, discoloration or effusion.  No bony tenderness to palpation.  Patient has full and pain-free ankle range of motion.  Sensation intact distally.  Normal gait.  Neurological: Pt is alert and oriented to person, place, and time. Coordination normal.   Skin: Skin is warm. Pt is not diaphoretic. No erythema.   Psychiatric: Pt has a normal mood and affect.  Behavior is normal.      "   Prior x-rays reviewed in clinic with no evidence of fracture or bony abnormality.             Assessment & Plan        1. Contusion of right ankle, subsequent encounter    Patient symptoms have fully improved since last visit.  May return to full duty without restrictions at this time.  Please call with any questions or concerns.  Return to the clinic for any change or worsening of symptoms.    Discharge MMI.    Differential diagnosis, natural history, supportive care, and indications for immediate follow-up discussed at length.

## 2021-12-16 ENCOUNTER — OFFICE VISIT (OUTPATIENT)
Dept: MEDICAL GROUP | Facility: PHYSICIAN GROUP | Age: 71
End: 2021-12-16
Payer: COMMERCIAL

## 2021-12-16 VITALS
OXYGEN SATURATION: 96 % | SYSTOLIC BLOOD PRESSURE: 140 MMHG | DIASTOLIC BLOOD PRESSURE: 80 MMHG | HEART RATE: 70 BPM | HEIGHT: 59 IN | TEMPERATURE: 98.6 F | BODY MASS INDEX: 31.47 KG/M2 | WEIGHT: 156.09 LBS

## 2021-12-16 DIAGNOSIS — R80.9 MICROALBUMINURIA: ICD-10-CM

## 2021-12-16 DIAGNOSIS — Z23 NEED FOR SHINGLES VACCINE: ICD-10-CM

## 2021-12-16 DIAGNOSIS — E78.5 DYSLIPIDEMIA: ICD-10-CM

## 2021-12-16 DIAGNOSIS — M10.9 GOUT, ARTHRITIS: ICD-10-CM

## 2021-12-16 DIAGNOSIS — R80.9 TYPE 2 DIABETES MELLITUS WITH MICROALBUMINURIA, WITHOUT LONG-TERM CURRENT USE OF INSULIN (HCC): ICD-10-CM

## 2021-12-16 DIAGNOSIS — E11.29 TYPE 2 DIABETES MELLITUS WITH MICROALBUMINURIA, WITHOUT LONG-TERM CURRENT USE OF INSULIN (HCC): ICD-10-CM

## 2021-12-16 DIAGNOSIS — I10 ESSENTIAL HYPERTENSION: ICD-10-CM

## 2021-12-16 PROCEDURE — 99214 OFFICE O/P EST MOD 30 MIN: CPT | Mod: 25 | Performed by: FAMILY MEDICINE

## 2021-12-16 PROCEDURE — 90471 IMMUNIZATION ADMIN: CPT | Performed by: FAMILY MEDICINE

## 2021-12-16 PROCEDURE — 90750 HZV VACC RECOMBINANT IM: CPT | Performed by: FAMILY MEDICINE

## 2021-12-16 ASSESSMENT — FIBROSIS 4 INDEX: FIB4 SCORE: 1.43

## 2021-12-16 NOTE — LETTER
[facility]  Keeley Woodward M.D.  [address]  [place] [address]  Fax: [phone]   Authorization for Release/Disclosure of   Protected Health Information   Name: SUE CAN : 1950 SSN: [id number]   Address: [facility] [address] [place] [address] Phone:    [phone] (home)    I authorize the entity listed below to release/disclose the PHI below to:   [facility]/Keeley Woodward M.D. and Keeley Woodward M.D.   Provider or Entity Name:    [facility]   Address   City, State, Zip   Phone:      Fax:     Reason for request: continuity of care   Information to be released:    [  ] LAST COLONOSCOPY,  including any PATH REPORT and follow-up  [  ] LAST FIT/COLOGUARD RESULT [  ] LAST DEXA  [  ] LAST MAMMOGRAM  [  ] LAST PAP  [  ] LAST LABS [  ] RETINA EXAM REPORT  [  ] IMMUNIZATION RECORDS  [  ] Release all info      [  ] Check here and initial the line next to each item to release ALL health information INCLUDING  _____ Care and treatment for drug and / or alcohol abuse  _____ HIV testing, infection status, or AIDS  _____ Genetic Testing    DATES OF SERVICE OR TIME PERIOD TO BE DISCLOSED: _____________  I understand and acknowledge that:  * This Authorization may be revoked at any time by you in writing, except if your health information has already been used or disclosed.  * Your health information that will be used or disclosed as a result of you signing this authorization could be re-disclosed by the recipient. If this occurs, your re-disclosed health information may no longer be protected by State or Federal laws.  * You may refuse to sign this Authorization. Your refusal will not affect your ability to obtain treatment.  * This Authorization becomes effective upon signing and will  on (date) __________.      If no date is indicated, this Authorization will  one (1) year from the signature date.    Name: Sue Can    Signature:   Date:     2021       PLEASE FAX  REQUESTED RECORDS BACK TO: [phone]

## 2021-12-17 NOTE — PROGRESS NOTES
"Subjective     Sue Can is a 71 y.o. female who presents with Diabetes            HPI     Patient is here for follow-up of her medical problems.  She is accompanied by her sister who always comes to her appointments.    For her diabetes mellitus type 2 with microalbuminuria, she is managing this with her own efforts.  She has lost 4 pounds since last visit.    For the microalbuminuria, she continues to take losartan.    Blood pressure is under control on losartan and metoprolol.    For dyslipidemia, she continues to take atorvastatin without myalgias.    For her gout, she continues to take allopurinol.  She has not had a gout attack in a while.      She needs second dose of the shingles vaccine.    She was recently seen at urgent care on 11/30/2021 for contusion of the right ankle which was a work-related injury.  She was seen for follow-up on 12/8/2021 and at that time she was released back to work without restrictions.  She is asking a note from me to release her back to work as per patient her work requires that she gets it from her PCP.      Past medical history, past surgical history, family history reviewed-no changes    Social history reviewed-no changes    Allergies reviewed-no changes    Medications reviewed-no changes    ROS     As per HPI, the rest are negative.           Objective     /80   Pulse 70   Temp 37 °C (98.6 °F) (Temporal)   Ht 1.499 m (4' 11\")   Wt 70.8 kg (156 lb 1.4 oz)   SpO2 96%   BMI 31.53 kg/m²      Physical Exam     Examined alert, awake, oriented, not in distress    Neck-supple, no lymphadenopathy, no thyromegaly  Lungs-clear to auscultation, no rales, no wheezes  Heart-regular rate and rhythm, no murmur  Extremities-no edema, clubbing, cyanosis             Occupational Medicine on 11/30/2021   Component Date Value Ref Range Status   • POC Urine Drug Screen Comment 11/30/2021 negative   Final   • Internal Control Positive 11/30/2021 Valid   Final   • Internal " Control Negative 11/30/2021 Valid   Final   • POC Breathalizer 11/30/2021 0.00  0.00 - 0.01 Percent Final                Assessment & Plan        1. Type 2 diabetes mellitus with microalbuminuria, without long-term current use of insulin (HCC)  This is under control with her own efforts so she will continue to manage this with diet, exercise and weight loss but discussed at length the diet she needs to follow.  - Lipid Profile; Future  - Comp Metabolic Panel; Future  - HEMOGLOBIN A1C; Future  - CBC WITH DIFFERENTIAL; Future  - MICROALBUMIN CREAT RATIO URINE; Future  - URIC ACID; Future    2. Microalbuminuria  Continue losartan.  We will check urine microalbumin next visit.  - Lipid Profile; Future  - Comp Metabolic Panel; Future  - HEMOGLOBIN A1C; Future  - CBC WITH DIFFERENTIAL; Future  - MICROALBUMIN CREAT RATIO URINE; Future  - URIC ACID; Future    3. Dyslipidemia  Triglycerides improved and now down to goal.  The HDL is also at goal.  The LDL however increase in this is now slightly high at 116.  The goal is below 100.  She said she takes her cholesterol medication regularly.  Advised to work harder on avoidance of fatty foods, eating more vegetables, eating fish three times a week, regular exercises and continue to lose weight.  Recheck blood work next visit.  Continue the same dose of cholesterol medication which is 40 mg daily.  - Lipid Profile; Future  - Comp Metabolic Panel; Future  - HEMOGLOBIN A1C; Future  - CBC WITH DIFFERENTIAL; Future  - MICROALBUMIN CREAT RATIO URINE; Future  - URIC ACID; Future    4. Essential hypertension  Under acceptable control on her meds.  Advised to watch her salt intake.  - Lipid Profile; Future  - Comp Metabolic Panel; Future  - HEMOGLOBIN A1C; Future  - CBC WITH DIFFERENTIAL; Future  - MICROALBUMIN CREAT RATIO URINE; Future  - URIC ACID; Future    5. Gout, arthritis  No recent attack of gout.  Continue allopurinol and we will check updated uric acid level next visit.  -  Lipid Profile; Future  - Comp Metabolic Panel; Future  - HEMOGLOBIN A1C; Future  - CBC WITH DIFFERENTIAL; Future  - MICROALBUMIN CREAT RATIO URINE; Future  - URIC ACID; Future    6. Need for shingles vaccine  She was given second dose of Shingrix.  Made aware of potential side effects and how she can manage them.  - Shingles Vaccine (Shingrix)    7.Work-related injury  Explained to patient that I am not Worker's Comp. provider therefore I will not be able to give her a work release.  She was already given work release by the urgent care provider on 12/8/2021 and I gave her a copy of the letter.  Her sister will try to help her get this taken care of at her workplace.     Follow-up in 6 months or sooner if needed.      Please note that this dictation was created using voice recognition software. I have worked with consultants from the vendor as well as technical experts from [facility]  [employer] to optimize the interface. I have made every reasonable attempt to correct obvious errors, but I expect that there are errors of grammar and possibly content I did not discover before finalizing the note.

## 2022-06-24 ENCOUNTER — HOSPITAL ENCOUNTER (OUTPATIENT)
Dept: LAB | Facility: MEDICAL CENTER | Age: 72
End: 2022-06-24
Attending: FAMILY MEDICINE
Payer: COMMERCIAL

## 2022-06-24 DIAGNOSIS — I10 ESSENTIAL HYPERTENSION: ICD-10-CM

## 2022-06-24 DIAGNOSIS — R80.9 MICROALBUMINURIA: ICD-10-CM

## 2022-06-24 DIAGNOSIS — E78.5 DYSLIPIDEMIA: ICD-10-CM

## 2022-06-24 DIAGNOSIS — E11.29 TYPE 2 DIABETES MELLITUS WITH MICROALBUMINURIA, WITHOUT LONG-TERM CURRENT USE OF INSULIN (HCC): ICD-10-CM

## 2022-06-24 DIAGNOSIS — M10.9 GOUT, ARTHRITIS: ICD-10-CM

## 2022-06-24 DIAGNOSIS — R80.9 TYPE 2 DIABETES MELLITUS WITH MICROALBUMINURIA, WITHOUT LONG-TERM CURRENT USE OF INSULIN (HCC): ICD-10-CM

## 2022-06-24 LAB
ALBUMIN SERPL BCP-MCNC: 4.4 G/DL (ref 3.2–4.9)
ALBUMIN/GLOB SERPL: 1.2 G/DL
ALP SERPL-CCNC: 68 U/L (ref 30–99)
ALT SERPL-CCNC: 14 U/L (ref 2–50)
ANION GAP SERPL CALC-SCNC: 12 MMOL/L (ref 7–16)
AST SERPL-CCNC: 17 U/L (ref 12–45)
BASOPHILS # BLD AUTO: 0.9 % (ref 0–1.8)
BASOPHILS # BLD: 0.07 K/UL (ref 0–0.12)
BILIRUB SERPL-MCNC: 0.7 MG/DL (ref 0.1–1.5)
BUN SERPL-MCNC: 13 MG/DL (ref 8–22)
CALCIUM SERPL-MCNC: 9.7 MG/DL (ref 8.5–10.5)
CHLORIDE SERPL-SCNC: 106 MMOL/L (ref 96–112)
CHOLEST SERPL-MCNC: 192 MG/DL (ref 100–199)
CO2 SERPL-SCNC: 22 MMOL/L (ref 20–33)
CREAT SERPL-MCNC: 0.62 MG/DL (ref 0.5–1.4)
CREAT UR-MCNC: 112.24 MG/DL
EOSINOPHIL # BLD AUTO: 0.16 K/UL (ref 0–0.51)
EOSINOPHIL NFR BLD: 2.1 % (ref 0–6.9)
ERYTHROCYTE [DISTWIDTH] IN BLOOD BY AUTOMATED COUNT: 41.2 FL (ref 35.9–50)
EST. AVERAGE GLUCOSE BLD GHB EST-MCNC: 154 MG/DL
FASTING STATUS PATIENT QL REPORTED: NORMAL
GFR SERPLBLD CREATININE-BSD FMLA CKD-EPI: 96 ML/MIN/1.73 M 2
GLOBULIN SER CALC-MCNC: 3.8 G/DL (ref 1.9–3.5)
GLUCOSE SERPL-MCNC: 107 MG/DL (ref 65–99)
HBA1C MFR BLD: 7 % (ref 4–5.6)
HCT VFR BLD AUTO: 45.7 % (ref 37–47)
HDLC SERPL-MCNC: 56 MG/DL
HGB BLD-MCNC: 15 G/DL (ref 12–16)
IMM GRANULOCYTES # BLD AUTO: 0.03 K/UL (ref 0–0.11)
IMM GRANULOCYTES NFR BLD AUTO: 0.4 % (ref 0–0.9)
LDLC SERPL CALC-MCNC: 111 MG/DL
LYMPHOCYTES # BLD AUTO: 3 K/UL (ref 1–4.8)
LYMPHOCYTES NFR BLD: 39.1 % (ref 22–41)
MCH RBC QN AUTO: 29.4 PG (ref 27–33)
MCHC RBC AUTO-ENTMCNC: 32.8 G/DL (ref 33.6–35)
MCV RBC AUTO: 89.4 FL (ref 81.4–97.8)
MICROALBUMIN UR-MCNC: 14.9 MG/DL
MICROALBUMIN/CREAT UR: 133 MG/G (ref 0–30)
MONOCYTES # BLD AUTO: 0.52 K/UL (ref 0–0.85)
MONOCYTES NFR BLD AUTO: 6.8 % (ref 0–13.4)
NEUTROPHILS # BLD AUTO: 3.9 K/UL (ref 2–7.15)
NEUTROPHILS NFR BLD: 50.7 % (ref 44–72)
NRBC # BLD AUTO: 0 K/UL
NRBC BLD-RTO: 0 /100 WBC
PLATELET # BLD AUTO: 248 K/UL (ref 164–446)
PMV BLD AUTO: 10 FL (ref 9–12.9)
POTASSIUM SERPL-SCNC: 3.9 MMOL/L (ref 3.6–5.5)
PROT SERPL-MCNC: 8.2 G/DL (ref 6–8.2)
RBC # BLD AUTO: 5.11 M/UL (ref 4.2–5.4)
SODIUM SERPL-SCNC: 140 MMOL/L (ref 135–145)
TRIGL SERPL-MCNC: 124 MG/DL (ref 0–149)
URATE SERPL-MCNC: 5.9 MG/DL (ref 1.9–8.2)
WBC # BLD AUTO: 7.7 K/UL (ref 4.8–10.8)

## 2022-06-24 PROCEDURE — 82043 UR ALBUMIN QUANTITATIVE: CPT

## 2022-06-24 PROCEDURE — 84550 ASSAY OF BLOOD/URIC ACID: CPT

## 2022-06-24 PROCEDURE — 83036 HEMOGLOBIN GLYCOSYLATED A1C: CPT | Mod: GA

## 2022-06-24 PROCEDURE — 82570 ASSAY OF URINE CREATININE: CPT

## 2022-06-24 PROCEDURE — 85025 COMPLETE CBC W/AUTO DIFF WBC: CPT

## 2022-06-24 PROCEDURE — 80061 LIPID PANEL: CPT

## 2022-06-24 PROCEDURE — 80053 COMPREHEN METABOLIC PANEL: CPT

## 2022-06-24 PROCEDURE — 36415 COLL VENOUS BLD VENIPUNCTURE: CPT

## 2022-06-30 ENCOUNTER — OFFICE VISIT (OUTPATIENT)
Dept: MEDICAL GROUP | Facility: PHYSICIAN GROUP | Age: 72
End: 2022-06-30
Payer: COMMERCIAL

## 2022-06-30 VITALS
DIASTOLIC BLOOD PRESSURE: 80 MMHG | SYSTOLIC BLOOD PRESSURE: 132 MMHG | OXYGEN SATURATION: 94 % | BODY MASS INDEX: 31.24 KG/M2 | HEIGHT: 59 IN | TEMPERATURE: 98.9 F | WEIGHT: 154.98 LBS | HEART RATE: 82 BPM

## 2022-06-30 DIAGNOSIS — R80.9 MICROALBUMINURIA: ICD-10-CM

## 2022-06-30 DIAGNOSIS — B07.9 VIRAL WARTS, UNSPECIFIED TYPE: ICD-10-CM

## 2022-06-30 DIAGNOSIS — E11.29 TYPE 2 DIABETES MELLITUS WITH MICROALBUMINURIA, WITHOUT LONG-TERM CURRENT USE OF INSULIN (HCC): ICD-10-CM

## 2022-06-30 DIAGNOSIS — R80.9 TYPE 2 DIABETES MELLITUS WITH MICROALBUMINURIA, WITHOUT LONG-TERM CURRENT USE OF INSULIN (HCC): ICD-10-CM

## 2022-06-30 DIAGNOSIS — E78.5 DYSLIPIDEMIA: ICD-10-CM

## 2022-06-30 DIAGNOSIS — Z23 NEED FOR PNEUMOCOCCAL VACCINATION: ICD-10-CM

## 2022-06-30 DIAGNOSIS — M10.9 GOUT, ARTHRITIS: ICD-10-CM

## 2022-06-30 DIAGNOSIS — I10 ESSENTIAL HYPERTENSION: ICD-10-CM

## 2022-06-30 PROCEDURE — 99214 OFFICE O/P EST MOD 30 MIN: CPT | Mod: 25 | Performed by: FAMILY MEDICINE

## 2022-06-30 PROCEDURE — 90677 PCV20 VACCINE IM: CPT | Performed by: FAMILY MEDICINE

## 2022-06-30 PROCEDURE — 90471 IMMUNIZATION ADMIN: CPT | Performed by: FAMILY MEDICINE

## 2022-06-30 RX ORDER — LOSARTAN POTASSIUM 50 MG/1
50 TABLET ORAL DAILY
Qty: 90 TABLET | Refills: 1 | Status: SHIPPED | OUTPATIENT
Start: 2022-06-30 | End: 2023-01-16 | Stop reason: SDUPTHER

## 2022-06-30 RX ORDER — ATORVASTATIN CALCIUM 40 MG/1
40 TABLET, FILM COATED ORAL EVERY EVENING
Qty: 90 TABLET | Refills: 1 | Status: SHIPPED | OUTPATIENT
Start: 2022-06-30 | End: 2023-01-16 | Stop reason: SDUPTHER

## 2022-06-30 RX ORDER — ALLOPURINOL 300 MG/1
300 TABLET ORAL DAILY
Qty: 90 TABLET | Refills: 1 | Status: SHIPPED | OUTPATIENT
Start: 2022-06-30 | End: 2023-01-16 | Stop reason: SDUPTHER

## 2022-06-30 RX ORDER — METOPROLOL SUCCINATE 25 MG/1
25 TABLET, EXTENDED RELEASE ORAL DAILY
Qty: 90 TABLET | Refills: 1 | Status: SHIPPED | OUTPATIENT
Start: 2022-06-30 | End: 2023-01-16 | Stop reason: SDUPTHER

## 2022-06-30 ASSESSMENT — FIBROSIS 4 INDEX: FIB4 SCORE: 1.26

## 2022-06-30 ASSESSMENT — PATIENT HEALTH QUESTIONNAIRE - PHQ9: CLINICAL INTERPRETATION OF PHQ2 SCORE: 0

## 2022-07-01 NOTE — PROGRESS NOTES
"Subjective     Sue Can is a 71 y.o. female who presents with Diabetes            HPI     Patient returns for follow-up of her medical problems accompanied by her sister who always comes to her appointment.    In terms of diabetes mellitus type 2, she has been managing this with her own efforts only.  Per sister, patient is not very good in watching her diet and eats chocolates.  She has lost 2 pounds however since last visit.  She needs retinal exam.    For her microalbuminuria, patient is already on losartan.    Blood pressure is under acceptable control on losartan and metoprolol.    She continues to take atorvastatin for dyslipidemia without myalgias.    In terms of her gout, patient continues to take allopurinol regularly.  She has not had any gout attack in a long time.    She wants me to treat a wart on her left index finger.    She is due for Prevnar 20.    Past medical history, past surgical history, family history reviewed-no changes    Social history reviewed-no changes    Allergies reviewed-no changes    Medications reviewed-no changes    ROS     As per HPI, the rest are negative.           Objective     /80 (BP Location: Left arm, Patient Position: Sitting, BP Cuff Size: Adult)   Pulse 82   Temp 37.2 °C (98.9 °F) (Temporal)   Ht 1.499 m (4' 11\")   Wt 70.3 kg (154 lb 15.7 oz)   SpO2 94%   BMI 31.30 kg/m²      Physical Exam     Examined alert, awake, oriented, not in distress    Neck-supple, no lymphadenopathy, no thyromegaly  Lungs-clear to auscultation, no rales, no wheezes  Heart-regular rate and rhythm, no murmur  Extremities-no edema, clubbing, cyanosis, 5 mm wart left index finger    Monofilament testing with a 10 gram force: sensation intact: intact bilaterally  Visual Inspection: Feet without maceration, ulcers, fissures.  Pedal pulses: intact bilaterally          Hospital Outpatient Visit on 06/24/2022   Component Date Value Ref Range Status   • Uric Acid 06/24/2022 5.9  " 1.9 - 8.2 mg/dL Final   • Creatinine, Urine 06/24/2022 112.24  mg/dL Final   • Microalbumin, Urine Random 06/24/2022 14.9  mg/dL Final   • Micro Alb Creat Ratio 06/24/2022 133 (A) 0 - 30 mg/g Final   • WBC 06/24/2022 7.7  4.8 - 10.8 K/uL Final   • RBC 06/24/2022 5.11  4.20 - 5.40 M/uL Final   • Hemoglobin 06/24/2022 15.0  12.0 - 16.0 g/dL Final   • Hematocrit 06/24/2022 45.7  37.0 - 47.0 % Final   • MCV 06/24/2022 89.4  81.4 - 97.8 fL Final   • MCH 06/24/2022 29.4  27.0 - 33.0 pg Final   • MCHC 06/24/2022 32.8 (A) 33.6 - 35.0 g/dL Final   • RDW 06/24/2022 41.2  35.9 - 50.0 fL Final   • Platelet Count 06/24/2022 248  164 - 446 K/uL Final   • MPV 06/24/2022 10.0  9.0 - 12.9 fL Final   • Neutrophils-Polys 06/24/2022 50.70  44.00 - 72.00 % Final   • Lymphocytes 06/24/2022 39.10  22.00 - 41.00 % Final   • Monocytes 06/24/2022 6.80  0.00 - 13.40 % Final   • Eosinophils 06/24/2022 2.10  0.00 - 6.90 % Final   • Basophils 06/24/2022 0.90  0.00 - 1.80 % Final   • Immature Granulocytes 06/24/2022 0.40  0.00 - 0.90 % Final   • Nucleated RBC 06/24/2022 0.00  /100 WBC Final   • Neutrophils (Absolute) 06/24/2022 3.90  2.00 - 7.15 K/uL Final    Includes immature neutrophils, if present.   • Lymphs (Absolute) 06/24/2022 3.00  1.00 - 4.80 K/uL Final   • Monos (Absolute) 06/24/2022 0.52  0.00 - 0.85 K/uL Final   • Eos (Absolute) 06/24/2022 0.16  0.00 - 0.51 K/uL Final   • Baso (Absolute) 06/24/2022 0.07  0.00 - 0.12 K/uL Final   • Immature Granulocytes (abs) 06/24/2022 0.03  0.00 - 0.11 K/uL Final   • NRBC (Absolute) 06/24/2022 0.00  K/uL Final   • Glycohemoglobin 06/24/2022 7.0 (A) 4.0 - 5.6 % Final    Comment: Increased risk for diabetes:  5.7 -6.4%  Diabetes:  >6.4%  Glycemic control for adults with diabetes:  <7.0%    The above interpretations are per ADA guidelines.  Diagnosis  of diabetes mellitus on the basis of elevated Hemoglobin A1c  should be confirmed by repeating the Hb A1c test.     • Est Avg Glucose 06/24/2022 154   mg/dL Final    Comment: The eAG calculation is based on the A1c-Derived Daily Glucose  (ADAG) study.  See the ADA's website for additional information.     • Sodium 06/24/2022 140  135 - 145 mmol/L Final   • Potassium 06/24/2022 3.9  3.6 - 5.5 mmol/L Final   • Chloride 06/24/2022 106  96 - 112 mmol/L Final   • Co2 06/24/2022 22  20 - 33 mmol/L Final   • Anion Gap 06/24/2022 12.0  7.0 - 16.0 Final   • Glucose 06/24/2022 107 (A) 65 - 99 mg/dL Final   • Bun 06/24/2022 13  8 - 22 mg/dL Final   • Creatinine 06/24/2022 0.62  0.50 - 1.40 mg/dL Final   • Calcium 06/24/2022 9.7  8.5 - 10.5 mg/dL Final   • AST(SGOT) 06/24/2022 17  12 - 45 U/L Final   • ALT(SGPT) 06/24/2022 14  2 - 50 U/L Final   • Alkaline Phosphatase 06/24/2022 68  30 - 99 U/L Final   • Total Bilirubin 06/24/2022 0.7  0.1 - 1.5 mg/dL Final   • Albumin 06/24/2022 4.4  3.2 - 4.9 g/dL Final   • Total Protein 06/24/2022 8.2  6.0 - 8.2 g/dL Final   • Globulin 06/24/2022 3.8 (A) 1.9 - 3.5 g/dL Final   • A-G Ratio 06/24/2022 1.2  g/dL Final   • Cholesterol,Tot 06/24/2022 192  100 - 199 mg/dL Final   • Triglycerides 06/24/2022 124  0 - 149 mg/dL Final   • HDL 06/24/2022 56  >=40 mg/dL Final   • LDL 06/24/2022 111 (A) <100 mg/dL Final   • Fasting Status 06/24/2022 Fasting   Final   • GFR (CKD-EPI) 06/24/2022 96  >60 mL/min/1.73 m 2 Final    Comment: Effective 3/15/2022, estimated Glomerular Filtration Rate  is calculated using race neutral CKD-EPI 2021 equation  per NKF-ASN recommendations.                    Assessment & Plan        1. Type 2 diabetes mellitus with microalbuminuria, without long-term current use of insulin (HCC)  Hemoglobin A1c increased from 6.9-7.0.  I discussed with patient and sister starting metformin to get this under control especially in the setting of microalbuminuria to avoid diabetes complications.  Advised to work hard on diet, exercise and weight loss.  Avoid eating desserts with high sugar content including chocolates.  We will do  updated blood work in 4 months.  Advised to do her retinal exam at soonest possible.  Advised that retinal exam should be done every year.  - metFORMIN (GLUCOPHAGE) 500 MG Tab; Take 1 Tablet by mouth 2 times a day with meals.  Dispense: 180 Tablet; Refill: 1  - Lipid Profile; Future  - Comp Metabolic Panel; Future  - HEMOGLOBIN A1C; Future  - Diabetic Monofilament Lower Extremity Exam    2. Microalbuminuria  She has ongoing microalbuminuria but her kidney function is still normal.  She is already on losartan.    3. Dyslipidemia  The LDL cholesterol improved but still needs to be below 100.  She is already on atorvastatin 40 mg daily but advised to work harder on watching the fatty foods, eating more vegetables, eating fish 3 times a week, regular exercises and weight loss.  Recheck lipid panel next visit.  - atorvastatin (LIPITOR) 40 MG Tab; Take 1 Tablet by mouth every evening.  Dispense: 90 Tablet; Refill: 1  - Lipid Profile; Future  - Comp Metabolic Panel; Future  - HEMOGLOBIN A1C; Future    4. Essential hypertension  Under acceptable control.  Continue meds.  - metoprolol SR (TOPROL XL) 25 MG TABLET SR 24 HR; Take 1 Tablet by mouth every day.  Dispense: 90 Tablet; Refill: 1  - losartan (COZAAR) 50 MG Tab; Take 1 Tablet by mouth every day.  Dispense: 90 Tablet; Refill: 1    5. Gout, arthritis  Uric acid level less than 6.0.  No gout attacks in a long time on allopurinol.  Continue medication.  - allopurinol (ZYLOPRIM) 300 MG Tab; Take 1 Tablet by mouth every day.  Dispense: 90 Tablet; Refill: 1    6. Viral warts, unspecified type  This was treated with liquid nitrogen.  Patient tolerated procedure well.  Posttreatment instructions given.    7. Need for pneumococcal vaccination  Prevnar 20 was given.  - Pneumococcal Conjugate Vaccine 20-Valent (19 yrs+)       Patient and sister informed that I am leaving the practice and relocating to [place].  Patient will establish care with another PCP and she was given  appointment to see CHRISTOPHER Howard when she returns for follow-up in October.      Please note that this dictation was created using voice recognition software. I have worked with consultants from the vendor as well as technical experts from [facility] to optimize the interface. I have made every reasonable attempt to correct obvious errors, but I expect that there are errors of grammar and possibly content I did not discover before finalizing the note.

## 2022-07-15 ENCOUNTER — HOSPITAL ENCOUNTER (OUTPATIENT)
Dept: RADIOLOGY | Facility: MEDICAL CENTER | Age: 72
End: 2022-07-15
Attending: FAMILY MEDICINE
Payer: COMMERCIAL

## 2022-07-15 DIAGNOSIS — Z12.31 ENCOUNTER FOR SCREENING MAMMOGRAM FOR BREAST CANCER: ICD-10-CM

## 2022-07-15 PROCEDURE — 77063 BREAST TOMOSYNTHESIS BI: CPT

## 2022-10-14 ENCOUNTER — HOSPITAL ENCOUNTER (OUTPATIENT)
Dept: LAB | Facility: MEDICAL CENTER | Age: 72
End: 2022-10-14
Attending: FAMILY MEDICINE
Payer: COMMERCIAL

## 2022-10-14 DIAGNOSIS — E78.5 DYSLIPIDEMIA: ICD-10-CM

## 2022-10-14 DIAGNOSIS — E11.29 TYPE 2 DIABETES MELLITUS WITH MICROALBUMINURIA, WITHOUT LONG-TERM CURRENT USE OF INSULIN (HCC): ICD-10-CM

## 2022-10-14 DIAGNOSIS — R80.9 TYPE 2 DIABETES MELLITUS WITH MICROALBUMINURIA, WITHOUT LONG-TERM CURRENT USE OF INSULIN (HCC): ICD-10-CM

## 2022-10-14 LAB
EST. AVERAGE GLUCOSE BLD GHB EST-MCNC: 134 MG/DL
HBA1C MFR BLD: 6.3 % (ref 4–5.6)

## 2022-10-14 PROCEDURE — 83036 HEMOGLOBIN GLYCOSYLATED A1C: CPT

## 2022-10-14 PROCEDURE — 80061 LIPID PANEL: CPT

## 2022-10-14 PROCEDURE — 80053 COMPREHEN METABOLIC PANEL: CPT

## 2022-10-14 PROCEDURE — 36415 COLL VENOUS BLD VENIPUNCTURE: CPT

## 2022-10-17 LAB
ALBUMIN SERPL BCP-MCNC: 4.3 G/DL (ref 3.2–4.9)
ALBUMIN/GLOB SERPL: 1.3 G/DL
ALP SERPL-CCNC: 57 U/L (ref 30–99)
ALT SERPL-CCNC: 15 U/L (ref 2–50)
ANION GAP SERPL CALC-SCNC: 11 MMOL/L (ref 7–16)
AST SERPL-CCNC: 26 U/L (ref 12–45)
BILIRUB SERPL-MCNC: 0.6 MG/DL (ref 0.1–1.5)
BUN SERPL-MCNC: 11 MG/DL (ref 8–22)
CALCIUM SERPL-MCNC: 9.3 MG/DL (ref 8.5–10.5)
CHLORIDE SERPL-SCNC: 105 MMOL/L (ref 96–112)
CHOLEST SERPL-MCNC: 177 MG/DL (ref 100–199)
CO2 SERPL-SCNC: 22 MMOL/L (ref 20–33)
CREAT SERPL-MCNC: 0.53 MG/DL (ref 0.5–1.4)
FASTING STATUS PATIENT QL REPORTED: NORMAL
GFR SERPLBLD CREATININE-BSD FMLA CKD-EPI: 99 ML/MIN/1.73 M 2
GLOBULIN SER CALC-MCNC: 3.4 G/DL (ref 1.9–3.5)
GLUCOSE SERPL-MCNC: 92 MG/DL (ref 65–99)
HDLC SERPL-MCNC: 51 MG/DL
LDLC SERPL CALC-MCNC: 102 MG/DL
POTASSIUM SERPL-SCNC: 4.9 MMOL/L (ref 3.6–5.5)
PROT SERPL-MCNC: 7.7 G/DL (ref 6–8.2)
SODIUM SERPL-SCNC: 138 MMOL/L (ref 135–145)
TRIGL SERPL-MCNC: 121 MG/DL (ref 0–149)

## 2022-10-20 SDOH — ECONOMIC STABILITY: FOOD INSECURITY: WITHIN THE PAST 12 MONTHS, THE FOOD YOU BOUGHT JUST DIDN'T LAST AND YOU DIDN'T HAVE MONEY TO GET MORE.: NEVER TRUE

## 2022-10-20 SDOH — ECONOMIC STABILITY: HOUSING INSECURITY
IN THE LAST 12 MONTHS, WAS THERE A TIME WHEN YOU DID NOT HAVE A STEADY PLACE TO SLEEP OR SLEPT IN A SHELTER (INCLUDING NOW)?: NO

## 2022-10-20 SDOH — HEALTH STABILITY: PHYSICAL HEALTH: ON AVERAGE, HOW MANY MINUTES DO YOU ENGAGE IN EXERCISE AT THIS LEVEL?: 10 MIN

## 2022-10-20 SDOH — ECONOMIC STABILITY: INCOME INSECURITY: IN THE LAST 12 MONTHS, WAS THERE A TIME WHEN YOU WERE NOT ABLE TO PAY THE MORTGAGE OR RENT ON TIME?: NO

## 2022-10-20 SDOH — ECONOMIC STABILITY: INCOME INSECURITY: HOW HARD IS IT FOR YOU TO PAY FOR THE VERY BASICS LIKE FOOD, HOUSING, MEDICAL CARE, AND HEATING?: NOT VERY HARD

## 2022-10-20 SDOH — HEALTH STABILITY: PHYSICAL HEALTH: ON AVERAGE, HOW MANY DAYS PER WEEK DO YOU ENGAGE IN MODERATE TO STRENUOUS EXERCISE (LIKE A BRISK WALK)?: 2 DAYS

## 2022-10-20 SDOH — ECONOMIC STABILITY: FOOD INSECURITY: WITHIN THE PAST 12 MONTHS, YOU WORRIED THAT YOUR FOOD WOULD RUN OUT BEFORE YOU GOT MONEY TO BUY MORE.: NEVER TRUE

## 2022-10-20 SDOH — ECONOMIC STABILITY: HOUSING INSECURITY: IN THE LAST 12 MONTHS, HOW MANY PLACES HAVE YOU LIVED?: 1

## 2022-10-20 SDOH — ECONOMIC STABILITY: TRANSPORTATION INSECURITY
IN THE PAST 12 MONTHS, HAS LACK OF TRANSPORTATION KEPT YOU FROM MEETINGS, WORK, OR FROM GETTING THINGS NEEDED FOR DAILY LIVING?: NO

## 2022-10-20 SDOH — ECONOMIC STABILITY: TRANSPORTATION INSECURITY
IN THE PAST 12 MONTHS, HAS LACK OF RELIABLE TRANSPORTATION KEPT YOU FROM MEDICAL APPOINTMENTS, MEETINGS, WORK OR FROM GETTING THINGS NEEDED FOR DAILY LIVING?: NO

## 2022-10-20 SDOH — HEALTH STABILITY: MENTAL HEALTH
STRESS IS WHEN SOMEONE FEELS TENSE, NERVOUS, ANXIOUS, OR CAN'T SLEEP AT NIGHT BECAUSE THEIR MIND IS TROUBLED. HOW STRESSED ARE YOU?: TO SOME EXTENT

## 2022-10-20 SDOH — ECONOMIC STABILITY: TRANSPORTATION INSECURITY
IN THE PAST 12 MONTHS, HAS THE LACK OF TRANSPORTATION KEPT YOU FROM MEDICAL APPOINTMENTS OR FROM GETTING MEDICATIONS?: NO

## 2022-10-20 ASSESSMENT — SOCIAL DETERMINANTS OF HEALTH (SDOH)
HOW OFTEN DO YOU GET TOGETHER WITH FRIENDS OR RELATIVES?: ONCE A WEEK
HOW HARD IS IT FOR YOU TO PAY FOR THE VERY BASICS LIKE FOOD, HOUSING, MEDICAL CARE, AND HEATING?: NOT VERY HARD
ARE YOU MARRIED, WIDOWED, DIVORCED, SEPARATED, NEVER MARRIED, OR LIVING WITH A PARTNER?: NEVER MARRIED
IN A TYPICAL WEEK, HOW MANY TIMES DO YOU TALK ON THE PHONE WITH FAMILY, FRIENDS, OR NEIGHBORS?: MORE THAN THREE TIMES A WEEK
HOW OFTEN DO YOU HAVE SIX OR MORE DRINKS ON ONE OCCASION: NEVER
DO YOU BELONG TO ANY CLUBS OR ORGANIZATIONS SUCH AS CHURCH GROUPS UNIONS, FRATERNAL OR ATHLETIC GROUPS, OR SCHOOL GROUPS?: NO
HOW MANY DRINKS CONTAINING ALCOHOL DO YOU HAVE ON A TYPICAL DAY WHEN YOU ARE DRINKING: PATIENT DOES NOT DRINK
IN A TYPICAL WEEK, HOW MANY TIMES DO YOU TALK ON THE PHONE WITH FAMILY, FRIENDS, OR NEIGHBORS?: MORE THAN THREE TIMES A WEEK
WITHIN THE PAST 12 MONTHS, YOU WORRIED THAT YOUR FOOD WOULD RUN OUT BEFORE YOU GOT THE MONEY TO BUY MORE: NEVER TRUE
HOW OFTEN DO YOU GET TOGETHER WITH FRIENDS OR RELATIVES?: ONCE A WEEK
HOW OFTEN DO YOU HAVE A DRINK CONTAINING ALCOHOL: NEVER
HOW OFTEN DO YOU ATTENT MEETINGS OF THE CLUB OR ORGANIZATION YOU BELONG TO?: NEVER
HOW OFTEN DO YOU ATTEND CHURCH OR RELIGIOUS SERVICES?: MORE THAN 4 TIMES PER YEAR
ARE YOU MARRIED, WIDOWED, DIVORCED, SEPARATED, NEVER MARRIED, OR LIVING WITH A PARTNER?: NEVER MARRIED
HOW OFTEN DO YOU ATTENT MEETINGS OF THE CLUB OR ORGANIZATION YOU BELONG TO?: NEVER
HOW OFTEN DO YOU ATTEND CHURCH OR RELIGIOUS SERVICES?: MORE THAN 4 TIMES PER YEAR
DO YOU BELONG TO ANY CLUBS OR ORGANIZATIONS SUCH AS CHURCH GROUPS UNIONS, FRATERNAL OR ATHLETIC GROUPS, OR SCHOOL GROUPS?: NO

## 2022-10-20 ASSESSMENT — LIFESTYLE VARIABLES
AUDIT-C TOTAL SCORE: 0
SKIP TO QUESTIONS 9-10: 1
HOW OFTEN DO YOU HAVE SIX OR MORE DRINKS ON ONE OCCASION: NEVER
HOW OFTEN DO YOU HAVE A DRINK CONTAINING ALCOHOL: NEVER
HOW MANY STANDARD DRINKS CONTAINING ALCOHOL DO YOU HAVE ON A TYPICAL DAY: PATIENT DOES NOT DRINK

## 2022-10-21 ENCOUNTER — OFFICE VISIT (OUTPATIENT)
Dept: MEDICAL GROUP | Facility: PHYSICIAN GROUP | Age: 72
End: 2022-10-21
Payer: COMMERCIAL

## 2022-10-21 VITALS
BODY MASS INDEX: 31.3 KG/M2 | DIASTOLIC BLOOD PRESSURE: 66 MMHG | TEMPERATURE: 99 F | SYSTOLIC BLOOD PRESSURE: 120 MMHG | OXYGEN SATURATION: 95 % | HEART RATE: 67 BPM | HEIGHT: 59 IN

## 2022-10-21 DIAGNOSIS — Z23 NEED FOR VACCINATION: ICD-10-CM

## 2022-10-21 DIAGNOSIS — R79.89 LOW TSH LEVEL: ICD-10-CM

## 2022-10-21 DIAGNOSIS — E11.9 TYPE 2 DIABETES MELLITUS WITHOUT COMPLICATION, WITHOUT LONG-TERM CURRENT USE OF INSULIN (HCC): ICD-10-CM

## 2022-10-21 DIAGNOSIS — Z12.11 SCREENING FOR COLON CANCER: ICD-10-CM

## 2022-10-21 DIAGNOSIS — I10 ESSENTIAL HYPERTENSION: ICD-10-CM

## 2022-10-21 DIAGNOSIS — E78.5 DYSLIPIDEMIA: ICD-10-CM

## 2022-10-21 PROCEDURE — 90662 IIV NO PRSV INCREASED AG IM: CPT

## 2022-10-21 PROCEDURE — 99214 OFFICE O/P EST MOD 30 MIN: CPT | Mod: 25

## 2022-10-21 PROCEDURE — 90471 IMMUNIZATION ADMIN: CPT

## 2022-10-21 NOTE — PROGRESS NOTES
Subjective:     CC:  Diagnoses of Need for vaccination, Screening for colon cancer, Low TSH level, Dyslipidemia, Type 2 diabetes mellitus without complication, without long-term current use of insulin (HCC), and Essential hypertension were pertinent to this visit.    HISTORY OF THE PRESENT ILLNESS: Patient is a 72 y.o. female. This pleasant patient is here today to establish care and discuss the following problems:    Problem   Essential Hypertension    Chronic stable condition for which patient takes losartan 50 mg daily and metoprolol 25 mg daily.  No reported side effects..  Blood pressure today in clinic is 120/66.     Type 2 Diabetes Mellitus Without Complication (Hcc)    Chronic condition stable  - A1c 6.3%.  Currently down from 7% 6 months ago.  - Taking metformin 500 mg twice daily.  Doing well on this medication without reported side effects.  Eats a healthy diet and exercises.  - Has retinal screening exam scheduled in November 2022     Dyslipidemia    Lab Results   Component Value Date/Time    CHOLSTRLTOT 177 10/14/2022 06:43 AM    CHOLSTRLTOT 192 06/24/2022 08:34 AM     (H) 10/14/2022 06:43 AM     (H) 06/24/2022 08:34 AM    HDL 51 10/14/2022 06:43 AM    HDL 56 06/24/2022 08:34 AM    TRIGLYCERIDE 121 10/14/2022 06:43 AM    TRIGLYCERIDE 124 06/24/2022 08:34 AM     Chronic condition stable and improving.  Patient taking atorvastatin 40 mg daily and eating a healthy diet and getting adequate exercise.  No side effects reported from medication.       Low Tsh Level    Remote history of low TSH level in 2013.  Patient has not had recent TSH level drawn we will recheck this at next lab draw.  Symptomatic for heat intolerance at this time, occasional agitation.  She denies hair loss, constipation or diarrhea, or dry skin.     Gout    Stable condition for which patient is taking allopurinol 300 mg daily.  No recent exacerbation of this condition.         Health Maintenance: Completed    ROS:   Review  "of Systems   All other systems reviewed and are negative.      Objective:     Exam: /66 (BP Location: Left arm, Patient Position: Sitting, BP Cuff Size: Small adult)   Pulse 67   Temp 37.2 °C (99 °F) (Temporal)   Ht 1.499 m (4' 11\")   SpO2 95%  Body mass index is 31.3 kg/m².    Physical Exam  Constitutional:       General: She is not in acute distress.     Appearance: Normal appearance. She is not ill-appearing or toxic-appearing.   HENT:      Head: Normocephalic.   Eyes:      Conjunctiva/sclera: Conjunctivae normal.   Cardiovascular:      Rate and Rhythm: Normal rate and regular rhythm.      Pulses: Normal pulses.      Heart sounds: Normal heart sounds. No murmur heard.  Pulmonary:      Effort: Pulmonary effort is normal. No respiratory distress.      Breath sounds: Normal breath sounds.   Skin:     General: Skin is warm and dry.   Neurological:      General: No focal deficit present.      Mental Status: She is alert and oriented to person, place, and time.   Psychiatric:         Mood and Affect: Mood normal.         Behavior: Behavior normal.         Labs:   Hospital Outpatient Visit on 10/14/2022   Component Date Value    Glycohemoglobin 10/14/2022 6.3 (A)     Est Avg Glucose 10/14/2022 134     Sodium 10/14/2022 138     Potassium 10/14/2022 4.9     Chloride 10/14/2022 105     Co2 10/14/2022 22     Anion Gap 10/14/2022 11.0     Glucose 10/14/2022 92     Bun 10/14/2022 11     Creatinine 10/14/2022 0.53     Calcium 10/14/2022 9.3     AST(SGOT) 10/14/2022 26     ALT(SGPT) 10/14/2022 15     Alkaline Phosphatase 10/14/2022 57     Total Bilirubin 10/14/2022 0.6     Albumin 10/14/2022 4.3     Total Protein 10/14/2022 7.7     Globulin 10/14/2022 3.4     A-G Ratio 10/14/2022 1.3     Cholesterol,Tot 10/14/2022 177     Triglycerides 10/14/2022 121     HDL 10/14/2022 51     LDL 10/14/2022 102 (A)     Fasting Status 10/14/2022 Fasting     GFR (CKD-EPI) 10/14/2022 99          Assessment & Plan: Medical Decision Making "   72 y.o. female with the following -    Problem List Items Addressed This Visit       Dyslipidemia     Chronic stable condition, improving  - Continue atorvastatin 40 mg daily  - Continue efforts towards healthy diet and exercise as discussed  -Repeat lipid panel in 6 months         Relevant Orders    Comp Metabolic Panel    Lipid Profile    Low TSH level     Chronic condition of unknown progression  - Recheck TSH with reflex to T4 at next lab draw         Relevant Orders    TSH WITH REFLEX TO FT4    Type 2 diabetes mellitus without complication (HCC)     Chronic stable condition improving  - Continue metformin 500 mg twice daily  - Continue efforts towards healthy diet and exercise  - Continue with retinal exam upcoming in November  - Repeat hemoglobin A1c in 6 months         Relevant Orders    Comp Metabolic Panel    HEMOGLOBIN A1C    Essential hypertension     Chronic, stable  - Continue metoprolol 25 mg daily and losartan 50 mg daily            Other Visit Diagnoses       Need for vaccination        Relevant Orders    INFLUENZA VACCINE, HIGH DOSE (65+ ONLY)    Screening for colon cancer        Relevant Orders    Referral to GI for Colonoscopy            Differential diagnosis, natural history, supportive care, and indications for immediate follow-up discussed.  Shared decision making approach was utilized, and patient is amendable with plan of care.  Patient understands to return to clinic or go to the emergency department if symptoms worsen. All questions and concerns addressed.      Return in about 6 months (around 4/21/2023) for F/U Lab Review.    Please note that this dictation was created using voice recognition software. I have made every reasonable attempt to correct obvious errors, but I expect that there are errors of grammar and possibly content that I did not discover before finalizing the note.

## 2022-10-21 NOTE — ASSESSMENT & PLAN NOTE
Chronic stable condition improving  - Continue metformin 500 mg twice daily  - Continue efforts towards healthy diet and exercise  - Continue with retinal exam upcoming in November  - Repeat hemoglobin A1c in 6 months

## 2022-10-21 NOTE — ASSESSMENT & PLAN NOTE
Chronic stable condition, improving  - Continue atorvastatin 40 mg daily  - Continue efforts towards healthy diet and exercise as discussed  -Repeat lipid panel in 6 months

## 2022-11-07 ENCOUNTER — PATIENT MESSAGE (OUTPATIENT)
Dept: HEALTH INFORMATION MANAGEMENT | Facility: OTHER | Age: 72
End: 2022-11-07

## 2023-01-16 DIAGNOSIS — M10.9 GOUT, ARTHRITIS: ICD-10-CM

## 2023-01-16 DIAGNOSIS — R80.9 TYPE 2 DIABETES MELLITUS WITH MICROALBUMINURIA, WITHOUT LONG-TERM CURRENT USE OF INSULIN (HCC): ICD-10-CM

## 2023-01-16 DIAGNOSIS — I10 ESSENTIAL HYPERTENSION: ICD-10-CM

## 2023-01-16 DIAGNOSIS — E78.5 DYSLIPIDEMIA: ICD-10-CM

## 2023-01-16 DIAGNOSIS — E11.29 TYPE 2 DIABETES MELLITUS WITH MICROALBUMINURIA, WITHOUT LONG-TERM CURRENT USE OF INSULIN (HCC): ICD-10-CM

## 2023-01-16 RX ORDER — METOPROLOL SUCCINATE 25 MG/1
25 TABLET, EXTENDED RELEASE ORAL DAILY
Qty: 90 TABLET | Refills: 1 | Status: SHIPPED | OUTPATIENT
Start: 2023-01-16 | End: 2023-09-12

## 2023-01-16 RX ORDER — LOSARTAN POTASSIUM 50 MG/1
50 TABLET ORAL DAILY
Qty: 90 TABLET | Refills: 1 | Status: SHIPPED | OUTPATIENT
Start: 2023-01-16 | End: 2023-09-12

## 2023-01-16 RX ORDER — ALLOPURINOL 300 MG/1
300 TABLET ORAL DAILY
Qty: 90 TABLET | Refills: 1 | Status: SHIPPED | OUTPATIENT
Start: 2023-01-16 | End: 2023-09-12

## 2023-01-16 RX ORDER — ATORVASTATIN CALCIUM 40 MG/1
40 TABLET, FILM COATED ORAL EVERY EVENING
Qty: 90 TABLET | Refills: 1 | Status: SHIPPED | OUTPATIENT
Start: 2023-01-16 | End: 2024-01-18

## 2023-04-14 ENCOUNTER — HOSPITAL ENCOUNTER (OUTPATIENT)
Dept: LAB | Facility: MEDICAL CENTER | Age: 73
End: 2023-04-14
Payer: COMMERCIAL

## 2023-04-14 DIAGNOSIS — E78.5 DYSLIPIDEMIA: ICD-10-CM

## 2023-04-14 DIAGNOSIS — E11.9 TYPE 2 DIABETES MELLITUS WITHOUT COMPLICATION, WITHOUT LONG-TERM CURRENT USE OF INSULIN (HCC): ICD-10-CM

## 2023-04-14 DIAGNOSIS — R79.89 LOW TSH LEVEL: ICD-10-CM

## 2023-04-14 LAB
EST. AVERAGE GLUCOSE BLD GHB EST-MCNC: 137 MG/DL
HBA1C MFR BLD: 6.4 % (ref 4–5.6)

## 2023-04-14 PROCEDURE — 36415 COLL VENOUS BLD VENIPUNCTURE: CPT

## 2023-04-14 PROCEDURE — 83036 HEMOGLOBIN GLYCOSYLATED A1C: CPT | Mod: GA

## 2023-04-14 PROCEDURE — 84443 ASSAY THYROID STIM HORMONE: CPT

## 2023-04-14 PROCEDURE — 80061 LIPID PANEL: CPT

## 2023-04-14 PROCEDURE — 80053 COMPREHEN METABOLIC PANEL: CPT

## 2023-04-15 LAB
ALBUMIN SERPL BCP-MCNC: 4.4 G/DL (ref 3.2–4.9)
ALBUMIN/GLOB SERPL: 1.3 G/DL
ALP SERPL-CCNC: 52 U/L (ref 30–99)
ALT SERPL-CCNC: 23 U/L (ref 2–50)
ANION GAP SERPL CALC-SCNC: 11 MMOL/L (ref 7–16)
AST SERPL-CCNC: 27 U/L (ref 12–45)
BILIRUB SERPL-MCNC: 0.6 MG/DL (ref 0.1–1.5)
BUN SERPL-MCNC: 18 MG/DL (ref 8–22)
CALCIUM ALBUM COR SERPL-MCNC: 9.3 MG/DL (ref 8.5–10.5)
CALCIUM SERPL-MCNC: 9.6 MG/DL (ref 8.5–10.5)
CHLORIDE SERPL-SCNC: 107 MMOL/L (ref 96–112)
CHOLEST SERPL-MCNC: 174 MG/DL (ref 100–199)
CO2 SERPL-SCNC: 23 MMOL/L (ref 20–33)
CREAT SERPL-MCNC: 0.72 MG/DL (ref 0.5–1.4)
FASTING STATUS PATIENT QL REPORTED: NORMAL
GFR SERPLBLD CREATININE-BSD FMLA CKD-EPI: 90 ML/MIN/1.73 M 2
GLOBULIN SER CALC-MCNC: 3.4 G/DL (ref 1.9–3.5)
GLUCOSE SERPL-MCNC: 104 MG/DL (ref 65–99)
HDLC SERPL-MCNC: 61 MG/DL
LDLC SERPL CALC-MCNC: 97 MG/DL
POTASSIUM SERPL-SCNC: 4.3 MMOL/L (ref 3.6–5.5)
PROT SERPL-MCNC: 7.8 G/DL (ref 6–8.2)
SODIUM SERPL-SCNC: 141 MMOL/L (ref 135–145)
TRIGL SERPL-MCNC: 79 MG/DL (ref 0–149)
TSH SERPL DL<=0.005 MIU/L-ACNC: 0.66 UIU/ML (ref 0.38–5.33)

## 2023-04-21 ENCOUNTER — OFFICE VISIT (OUTPATIENT)
Dept: MEDICAL GROUP | Facility: PHYSICIAN GROUP | Age: 73
End: 2023-04-21
Payer: COMMERCIAL

## 2023-04-21 VITALS
HEIGHT: 59 IN | OXYGEN SATURATION: 96 % | SYSTOLIC BLOOD PRESSURE: 130 MMHG | WEIGHT: 144 LBS | HEART RATE: 81 BPM | TEMPERATURE: 98.7 F | DIASTOLIC BLOOD PRESSURE: 60 MMHG | BODY MASS INDEX: 29.03 KG/M2

## 2023-04-21 DIAGNOSIS — E11.9 TYPE 2 DIABETES MELLITUS WITHOUT COMPLICATION, WITHOUT LONG-TERM CURRENT USE OF INSULIN (HCC): ICD-10-CM

## 2023-04-21 DIAGNOSIS — Z78.0 POST-MENOPAUSAL: ICD-10-CM

## 2023-04-21 DIAGNOSIS — E78.5 DYSLIPIDEMIA: ICD-10-CM

## 2023-04-21 DIAGNOSIS — I10 ESSENTIAL HYPERTENSION: ICD-10-CM

## 2023-04-21 DIAGNOSIS — M85.89 OSTEOPENIA OF MULTIPLE SITES: ICD-10-CM

## 2023-04-21 PROBLEM — K57.30 DIVERTICULOSIS OF LARGE INTESTINE WITHOUT PERFORATION OR ABSCESS WITHOUT BLEEDING: Status: ACTIVE | Noted: 2023-01-13

## 2023-04-21 PROBLEM — H40.9 GLAUCOMA: Status: ACTIVE | Noted: 2023-04-21

## 2023-04-21 PROBLEM — K63.89 OTHER SPECIFIED DISORDERS OF INTESTINES: Status: ACTIVE | Noted: 2023-04-21

## 2023-04-21 PROBLEM — K63.5 POLYP OF COLON: Status: ACTIVE | Noted: 2023-01-13

## 2023-04-21 PROBLEM — K64.0 FIRST DEGREE HEMORRHOIDS: Status: ACTIVE | Noted: 2023-01-13

## 2023-04-21 PROCEDURE — 99214 OFFICE O/P EST MOD 30 MIN: CPT

## 2023-04-21 ASSESSMENT — ENCOUNTER SYMPTOMS
SHORTNESS OF BREATH: 0
ABDOMINAL PAIN: 0
WEAKNESS: 0
DIARRHEA: 0
NAUSEA: 0
VOMITING: 0
CHILLS: 0
WEIGHT LOSS: 0
HEADACHES: 0
COUGH: 0
DIZZINESS: 0
MYALGIAS: 0
BLURRED VISION: 0
CONSTIPATION: 0
FEVER: 0

## 2023-04-21 ASSESSMENT — PATIENT HEALTH QUESTIONNAIRE - PHQ9: CLINICAL INTERPRETATION OF PHQ2 SCORE: 0

## 2023-04-21 ASSESSMENT — FIBROSIS 4 INDEX: FIB4 SCORE: 1.59

## 2023-04-21 NOTE — PROGRESS NOTES
"Subjective:     CC: Chronic health topics    HPI:   SNEHA presents today with with her sister, Noa    Problem   Glaucoma   Other Specified Disorders of Intestines   Osteopenia of Multiple Sites    Last DEXA scan done 2018 does reveal osteopenia of multiple sites.  Patient does not currently take supplemental calcium or vitamin D.  No recent history of fracture.     Diverticulosis of Large Intestine Without Perforation Or Abscess Without Bleeding   First Degree Hemorrhoids   Polyp of Colon   Essential Hypertension    Chronic stable condition for which patient takes losartan 50 mg daily and metoprolol 25 mg daily.  No reported side effects..  Blood pressure today in clinic is 130/60     Type 2 Diabetes Mellitus Without Complication (Hcc)    Chronic condition stable  - A1c 6.4%.    -Up-to-date on diabetic screenings  - Taking metformin 500 mg twice daily.  Doing well on this medication without reported side effects.  Eats a healthy diet and exercises.         Dyslipidemia    Chronic condition stable and improving.  Patient taking atorvastatin 40 mg daily and eating a healthy diet and getting adequate exercise.  No side effects reported from medication.             Health Maintenance: Completed    ROS:  Review of Systems   Constitutional:  Negative for chills, fever, malaise/fatigue and weight loss.   Eyes:  Negative for blurred vision.   Respiratory:  Negative for cough and shortness of breath.    Cardiovascular:  Negative for chest pain.   Gastrointestinal:  Negative for abdominal pain, constipation, diarrhea, nausea and vomiting.   Musculoskeletal:  Negative for myalgias.   Neurological:  Negative for dizziness, weakness and headaches.     Objective:     Exam:  /60 (BP Location: Left arm, Patient Position: Sitting, BP Cuff Size: Adult)   Pulse 81   Temp 37.1 °C (98.7 °F) (Temporal)   Ht 1.499 m (4' 11\")   Wt 65.3 kg (144 lb)   SpO2 96%   BMI 29.08 kg/m²  Body mass index is 29.08 kg/m².    Physical " Exam  Constitutional:       General: She is not in acute distress.     Appearance: Normal appearance. She is not ill-appearing or toxic-appearing.   HENT:      Head: Normocephalic.   Eyes:      Conjunctiva/sclera: Conjunctivae normal.   Cardiovascular:      Rate and Rhythm: Normal rate and regular rhythm.      Pulses: Normal pulses.      Heart sounds: Normal heart sounds. No murmur heard.  Pulmonary:      Effort: Pulmonary effort is normal. No respiratory distress.      Breath sounds: Normal breath sounds.   Skin:     General: Skin is warm and dry.   Neurological:      General: No focal deficit present.      Mental Status: She is alert and oriented to person, place, and time.   Psychiatric:         Mood and Affect: Mood normal.         Behavior: Behavior normal.       Labs:   Lab Results   Component Value Date/Time    CHOLSTRLTOT 174 04/14/2023 06:48 AM    LDL 97 04/14/2023 06:48 AM    HDL 61 04/14/2023 06:48 AM    TRIGLYCERIDE 79 04/14/2023 06:48 AM       Lab Results   Component Value Date/Time    SODIUM 141 04/14/2023 06:48 AM    POTASSIUM 4.3 04/14/2023 06:48 AM    CHLORIDE 107 04/14/2023 06:48 AM    CO2 23 04/14/2023 06:48 AM    GLUCOSE 104 (H) 04/14/2023 06:48 AM    BUN 18 04/14/2023 06:48 AM    CREATININE 0.72 04/14/2023 06:48 AM     Lab Results   Component Value Date/Time    ALKPHOSPHAT 52 04/14/2023 06:48 AM    ASTSGOT 27 04/14/2023 06:48 AM    ALTSGPT 23 04/14/2023 06:48 AM    TBILIRUBIN 0.6 04/14/2023 06:48 AM      Lab Results   Component Value Date/Time    HBA1C 6.4 (H) 04/14/2023 06:48 AM    HBA1C 6.3 (H) 10/14/2022 06:43 AM    HBA1C 7.0 (H) 06/24/2022 08:34 AM     No results found for: TSH  Lab Results   Component Value Date/Time    FREET4 0.86 10/26/2013 09:26 AM         Assessment & Plan: Medical Decision Making     72 y.o. female with the following -     1. Type 2 diabetes mellitus without complication, without long-term current use of insulin (HCC)  Chronic condition currently stable with A1c at  goal.  Therefore we will continue current regimen of metformin 500 mg twice daily  -Recommend continuation of healthy diet and exercise  - HEMOGLOBIN A1C; Future  - Comp Metabolic Panel; Future  - Lipid Profile; Future  - MICROALBUMIN CREAT RATIO URINE; Future    2. Dyslipidemia  Chronic condition stable therefore we will continue patient on atorvastatin 40 mg daily  - Comp Metabolic Panel; Future  - Lipid Profile; Future    3. Essential hypertension  Chronic condition stable with BP in clinic today 130/60.  Therefore we will continue patient on losartan 50 mg daily  - Comp Metabolic Panel; Future  - MICROALBUMIN CREAT RATIO URINE; Future    4. Osteopenia of multiple sites  Chronic condition  -Recommend 1200 mg of calcium daily as well as vitamin D3 1000 IUs daily  -Recommend weightbearing exercise  - DS-BONE DENSITY STUDY (DEXA); Future    5. Post-menopausal    - DS-BONE DENSITY STUDY (DEXA); Future      Differential diagnosis, natural history, supportive care, and indications for immediate follow-up discussed.  Shared decision making approach utilized, and patient is amendable with plan of care.  Patient understands to return to clinic or go to the emergency department if symptoms worsen. All questions and concerns addressed to the best of my knowledge.    Return in about 4 months (around 8/21/2023) for F/U Lab Review.    Please note that this dictation was created using voice recognition software. I have made every reasonable attempt to correct obvious errors, but I expect that there are errors of grammar and possibly content that I did not discover before finalizing the note.

## 2023-06-02 ENCOUNTER — HOSPITAL ENCOUNTER (OUTPATIENT)
Dept: RADIOLOGY | Facility: MEDICAL CENTER | Age: 73
End: 2023-06-02
Payer: COMMERCIAL

## 2023-06-02 DIAGNOSIS — M85.89 OSTEOPENIA OF MULTIPLE SITES: ICD-10-CM

## 2023-06-02 DIAGNOSIS — Z78.0 POST-MENOPAUSAL: ICD-10-CM

## 2023-06-02 PROCEDURE — 77080 DXA BONE DENSITY AXIAL: CPT

## 2023-08-05 ENCOUNTER — HOSPITAL ENCOUNTER (OUTPATIENT)
Dept: LAB | Facility: MEDICAL CENTER | Age: 73
End: 2023-08-05
Attending: FAMILY MEDICINE
Payer: COMMERCIAL

## 2023-08-05 DIAGNOSIS — I10 ESSENTIAL HYPERTENSION: ICD-10-CM

## 2023-08-05 DIAGNOSIS — E78.5 DYSLIPIDEMIA: ICD-10-CM

## 2023-08-05 DIAGNOSIS — E11.9 TYPE 2 DIABETES MELLITUS WITHOUT COMPLICATION, WITHOUT LONG-TERM CURRENT USE OF INSULIN (HCC): ICD-10-CM

## 2023-08-05 LAB
ALBUMIN SERPL BCP-MCNC: 4.3 G/DL (ref 3.2–4.9)
ALBUMIN/GLOB SERPL: 1.3 G/DL
ALP SERPL-CCNC: 58 U/L (ref 30–99)
ALT SERPL-CCNC: 17 U/L (ref 2–50)
ANION GAP SERPL CALC-SCNC: 11 MMOL/L (ref 7–16)
AST SERPL-CCNC: 18 U/L (ref 12–45)
BILIRUB SERPL-MCNC: 0.7 MG/DL (ref 0.1–1.5)
BUN SERPL-MCNC: 16 MG/DL (ref 8–22)
CALCIUM ALBUM COR SERPL-MCNC: 9.4 MG/DL (ref 8.5–10.5)
CALCIUM SERPL-MCNC: 9.6 MG/DL (ref 8.5–10.5)
CHLORIDE SERPL-SCNC: 106 MMOL/L (ref 96–112)
CHOLEST SERPL-MCNC: 188 MG/DL (ref 100–199)
CO2 SERPL-SCNC: 25 MMOL/L (ref 20–33)
CREAT SERPL-MCNC: 0.68 MG/DL (ref 0.5–1.4)
CREAT UR-MCNC: 77.79 MG/DL
EST. AVERAGE GLUCOSE BLD GHB EST-MCNC: 137 MG/DL
GFR SERPLBLD CREATININE-BSD FMLA CKD-EPI: 93 ML/MIN/1.73 M 2
GLOBULIN SER CALC-MCNC: 3.3 G/DL (ref 1.9–3.5)
GLUCOSE SERPL-MCNC: 97 MG/DL (ref 65–99)
HBA1C MFR BLD: 6.4 % (ref 4–5.6)
HDLC SERPL-MCNC: 57 MG/DL
LDLC SERPL CALC-MCNC: 102 MG/DL
MICROALBUMIN UR-MCNC: 3.2 MG/DL
MICROALBUMIN/CREAT UR: 41 MG/G (ref 0–30)
POTASSIUM SERPL-SCNC: 3.9 MMOL/L (ref 3.6–5.5)
PROT SERPL-MCNC: 7.6 G/DL (ref 6–8.2)
SODIUM SERPL-SCNC: 142 MMOL/L (ref 135–145)
TRIGL SERPL-MCNC: 144 MG/DL (ref 0–149)

## 2023-08-05 PROCEDURE — 83036 HEMOGLOBIN GLYCOSYLATED A1C: CPT | Mod: GA

## 2023-08-05 PROCEDURE — 82570 ASSAY OF URINE CREATININE: CPT

## 2023-08-05 PROCEDURE — 80053 COMPREHEN METABOLIC PANEL: CPT

## 2023-08-05 PROCEDURE — 80061 LIPID PANEL: CPT

## 2023-08-05 PROCEDURE — 82043 UR ALBUMIN QUANTITATIVE: CPT

## 2023-08-05 PROCEDURE — 36415 COLL VENOUS BLD VENIPUNCTURE: CPT

## 2023-08-11 ENCOUNTER — OFFICE VISIT (OUTPATIENT)
Dept: MEDICAL GROUP | Facility: PHYSICIAN GROUP | Age: 73
End: 2023-08-11
Payer: COMMERCIAL

## 2023-08-11 VITALS
DIASTOLIC BLOOD PRESSURE: 60 MMHG | HEART RATE: 77 BPM | HEIGHT: 59 IN | BODY MASS INDEX: 28.59 KG/M2 | TEMPERATURE: 99 F | OXYGEN SATURATION: 94 % | WEIGHT: 141.8 LBS | SYSTOLIC BLOOD PRESSURE: 130 MMHG

## 2023-08-11 DIAGNOSIS — E11.69 DYSLIPIDEMIA ASSOCIATED WITH TYPE 2 DIABETES MELLITUS (HCC): ICD-10-CM

## 2023-08-11 DIAGNOSIS — E11.59 HYPERTENSION ASSOCIATED WITH TYPE 2 DIABETES MELLITUS (HCC): ICD-10-CM

## 2023-08-11 DIAGNOSIS — E78.5 DYSLIPIDEMIA ASSOCIATED WITH TYPE 2 DIABETES MELLITUS (HCC): ICD-10-CM

## 2023-08-11 DIAGNOSIS — I15.2 HYPERTENSION ASSOCIATED WITH TYPE 2 DIABETES MELLITUS (HCC): ICD-10-CM

## 2023-08-11 DIAGNOSIS — E11.65 TYPE 2 DIABETES MELLITUS WITH HYPERGLYCEMIA, WITHOUT LONG-TERM CURRENT USE OF INSULIN (HCC): ICD-10-CM

## 2023-08-11 PROCEDURE — 3075F SYST BP GE 130 - 139MM HG: CPT

## 2023-08-11 PROCEDURE — 99214 OFFICE O/P EST MOD 30 MIN: CPT

## 2023-08-11 PROCEDURE — 3078F DIAST BP <80 MM HG: CPT

## 2023-08-11 RX ORDER — PREDNISOLONE ACETATE 10 MG/ML
SUSPENSION/ DROPS OPHTHALMIC
COMMUNITY
Start: 2023-07-14 | End: 2024-02-22

## 2023-08-11 ASSESSMENT — ENCOUNTER SYMPTOMS
NAUSEA: 0
WEAKNESS: 0
COUGH: 0
MYALGIAS: 0
FEVER: 0
WEIGHT LOSS: 0
DIZZINESS: 0
ABDOMINAL PAIN: 0
CHILLS: 0
HEADACHES: 0
BLURRED VISION: 0
VOMITING: 0
CONSTIPATION: 0
DIARRHEA: 0
SHORTNESS OF BREATH: 0

## 2023-08-11 ASSESSMENT — FIBROSIS 4 INDEX: FIB4 SCORE: 1.25

## 2023-08-11 NOTE — PROGRESS NOTES
"Subjective:     CC: lab review    HPI:   SNEHA presents today with her sister, Noa Pearl   Hypertension Associated With Type 2 Diabetes Mellitus (Hcc)    Chronic stable condition for which patient takes losartan 50 mg daily and metoprolol 25 mg daily.  No reported side effects. Blood pressure today in clinic is 130/60     Type 2 Diabetes Mellitus With Hyperglycemia, Without Long-Term Current Use of Insulin (Hcc)    hronic condition stable  - A1c 6.4%.    -Up-to-date on diabetic screenings  - Taking metformin 500 mg twice daily.  Doing well on this medication without reported side effects.  Eats a healthy diet and exercises.  -Due for monofilament and retinal screen         Dyslipidemia associated with type 2 diabetes mellitus (HCC)    Chronic condition stable and improving.  Patient taking atorvastatin 40 mg daily and eating a healthy diet and getting adequate exercise.  No side effects reported from medication.         Type II Or Unspecified Type Diabetes Mellitus Without Mention of Complication, Uncontrolled (Resolved)       Health Maintenance: Completed    ROS:  Review of Systems   Constitutional:  Negative for chills, fever, malaise/fatigue and weight loss.   Eyes:  Negative for blurred vision.   Respiratory:  Negative for cough and shortness of breath.    Cardiovascular:  Negative for chest pain.   Gastrointestinal:  Negative for abdominal pain, constipation, diarrhea, nausea and vomiting.   Musculoskeletal:  Negative for myalgias.   Neurological:  Negative for dizziness, weakness and headaches.       Objective:     Exam:  /60 (BP Location: Left arm, Patient Position: Sitting, BP Cuff Size: Adult)   Pulse 77   Temp 37.2 °C (99 °F) (Temporal)   Ht 1.499 m (4' 11\")   Wt 64.3 kg (141 lb 12.8 oz)   SpO2 94%   BMI 28.64 kg/m²  Body mass index is 28.64 kg/m².    Physical Exam  Constitutional:       General: She is not in acute distress.     Appearance: Normal appearance. She is not ill-appearing " or toxic-appearing.   HENT:      Head: Normocephalic.   Eyes:      Conjunctiva/sclera: Conjunctivae normal.   Cardiovascular:      Rate and Rhythm: Normal rate and regular rhythm.      Pulses: Normal pulses.      Heart sounds: Normal heart sounds. No murmur heard.  Pulmonary:      Effort: Pulmonary effort is normal. No respiratory distress.      Breath sounds: Normal breath sounds.   Skin:     General: Skin is warm and dry.   Neurological:      General: No focal deficit present.      Mental Status: She is alert and oriented to person, place, and time.   Psychiatric:         Mood and Affect: Mood normal.         Behavior: Behavior normal.         Labs:   Hospital Outpatient Visit on 08/05/2023   Component Date Value    Creatinine, Urine 08/05/2023 77.79     Microalbumin, Urine Rand* 08/05/2023 3.2     Micro Alb Creat Ratio 08/05/2023 41 (H)     Cholesterol,Tot 08/05/2023 188     Triglycerides 08/05/2023 144     HDL 08/05/2023 57     LDL 08/05/2023 102 (H)     Sodium 08/05/2023 142     Potassium 08/05/2023 3.9     Chloride 08/05/2023 106     Co2 08/05/2023 25     Anion Gap 08/05/2023 11.0     Glucose 08/05/2023 97     Bun 08/05/2023 16     Creatinine 08/05/2023 0.68     Calcium 08/05/2023 9.6     Correct Calcium 08/05/2023 9.4     AST(SGOT) 08/05/2023 18     ALT(SGPT) 08/05/2023 17     Alkaline Phosphatase 08/05/2023 58     Total Bilirubin 08/05/2023 0.7     Albumin 08/05/2023 4.3     Total Protein 08/05/2023 7.6     Globulin 08/05/2023 3.3     A-G Ratio 08/05/2023 1.3     Glycohemoglobin 08/05/2023 6.4 (H)     Est Avg Glucose 08/05/2023 137     GFR (CKD-EPI) 08/05/2023 93          Assessment & Plan: Medical Decision Making     73 y.o. female with the following -     Problem List Items Addressed This Visit       Dyslipidemia associated with type 2 diabetes mellitus (HCC)     Chronic stable condition  - Continue atorvastatin 40 mg daily  - Continue efforts towards healthy diet and exercise as discussed  -Repeat lipid  panel in 6 months         Type 2 diabetes mellitus with hyperglycemia, without long-term current use of insulin (HCC)     Chronic stable condition   - Continue metformin 500 mg twice daily  - Continue efforts towards healthy diet and exercise  - Repeat hemoglobin A1c in 6 months  -Monofilament testing with a 10 gram force: sensation intact: intact bilaterally  Visual Inspection: Feet without maceration, ulcers, fissures.  Pedal pulses: intact bilaterally           Relevant Orders    Referral for Retinal Screening Exam    Hypertension associated with type 2 diabetes mellitus (HCC)     Chronic, stable  - Continue metoprolol 25 mg daily and losartan 50 mg daily              Differential diagnosis, natural history, supportive care, and indications for immediate follow-up discussed.  Shared decision making approach utilized, and patient is amendable with plan of care.  Patient understands to return to clinic or go to the emergency department if symptoms worsen. All questions and concerns addressed to the best of my knowledge.    Return in about 6 months (around 2/11/2024) for F/U Lab Review.    Please note that this dictation was created using voice recognition software. I have made every reasonable attempt to correct obvious errors, but I expect that there are errors of grammar and possibly content that I did not discover before finalizing the note.

## 2023-08-11 NOTE — ASSESSMENT & PLAN NOTE
Chronic stable condition   - Continue metformin 500 mg twice daily  - Continue efforts towards healthy diet and exercise  - Repeat hemoglobin A1c in 6 months  -Monofilament testing with a 10 gram force: sensation intact: intact bilaterally  Visual Inspection: Feet without maceration, ulcers, fissures.  Pedal pulses: intact bilaterally

## 2023-08-11 NOTE — ASSESSMENT & PLAN NOTE
Chronic stable condition  - Continue atorvastatin 40 mg daily  - Continue efforts towards healthy diet and exercise as discussed  -Repeat lipid panel in 6 months

## 2023-09-04 ENCOUNTER — OFFICE VISIT (OUTPATIENT)
Dept: URGENT CARE | Facility: PHYSICIAN GROUP | Age: 73
End: 2023-09-04
Payer: COMMERCIAL

## 2023-09-04 ENCOUNTER — HOSPITAL ENCOUNTER (OUTPATIENT)
Facility: MEDICAL CENTER | Age: 73
End: 2023-09-04
Attending: STUDENT IN AN ORGANIZED HEALTH CARE EDUCATION/TRAINING PROGRAM
Payer: COMMERCIAL

## 2023-09-04 VITALS
RESPIRATION RATE: 20 BRPM | TEMPERATURE: 99.3 F | WEIGHT: 139.8 LBS | OXYGEN SATURATION: 95 % | DIASTOLIC BLOOD PRESSURE: 60 MMHG | HEART RATE: 93 BPM | BODY MASS INDEX: 26.39 KG/M2 | SYSTOLIC BLOOD PRESSURE: 122 MMHG | HEIGHT: 61 IN

## 2023-09-04 DIAGNOSIS — U07.1 COVID-19: ICD-10-CM

## 2023-09-04 DIAGNOSIS — N30.01 ACUTE CYSTITIS WITH HEMATURIA: ICD-10-CM

## 2023-09-04 DIAGNOSIS — J06.9 VIRAL URI WITH COUGH: ICD-10-CM

## 2023-09-04 DIAGNOSIS — R50.9 FEVER, UNSPECIFIED FEVER CAUSE: ICD-10-CM

## 2023-09-04 LAB
APPEARANCE UR: CLEAR
BILIRUB UR STRIP-MCNC: NEGATIVE MG/DL
COLOR UR AUTO: YELLOW
FLUAV RNA SPEC QL NAA+PROBE: NEGATIVE
FLUBV RNA SPEC QL NAA+PROBE: NEGATIVE
GLUCOSE UR STRIP.AUTO-MCNC: NEGATIVE MG/DL
KETONES UR STRIP.AUTO-MCNC: NEGATIVE MG/DL
LEUKOCYTE ESTERASE UR QL STRIP.AUTO: NORMAL
NITRITE UR QL STRIP.AUTO: NEGATIVE
PH UR STRIP.AUTO: 5.5 [PH] (ref 5–8)
PROT UR QL STRIP: 30 MG/DL
RBC UR QL AUTO: NORMAL
RSV RNA SPEC QL NAA+PROBE: NEGATIVE
SARS-COV-2 RNA RESP QL NAA+PROBE: POSITIVE
SP GR UR STRIP.AUTO: 1.02
UROBILINOGEN UR STRIP-MCNC: 0.2 MG/DL

## 2023-09-04 PROCEDURE — 3074F SYST BP LT 130 MM HG: CPT | Performed by: STUDENT IN AN ORGANIZED HEALTH CARE EDUCATION/TRAINING PROGRAM

## 2023-09-04 PROCEDURE — 0241U POCT CEPHEID COV-2, FLU A/B, RSV - PCR: CPT | Performed by: STUDENT IN AN ORGANIZED HEALTH CARE EDUCATION/TRAINING PROGRAM

## 2023-09-04 PROCEDURE — 87086 URINE CULTURE/COLONY COUNT: CPT

## 2023-09-04 PROCEDURE — 3078F DIAST BP <80 MM HG: CPT | Performed by: STUDENT IN AN ORGANIZED HEALTH CARE EDUCATION/TRAINING PROGRAM

## 2023-09-04 PROCEDURE — 81002 URINALYSIS NONAUTO W/O SCOPE: CPT | Performed by: STUDENT IN AN ORGANIZED HEALTH CARE EDUCATION/TRAINING PROGRAM

## 2023-09-04 PROCEDURE — 99214 OFFICE O/P EST MOD 30 MIN: CPT | Performed by: STUDENT IN AN ORGANIZED HEALTH CARE EDUCATION/TRAINING PROGRAM

## 2023-09-04 RX ORDER — CEFDINIR 300 MG/1
300 CAPSULE ORAL EVERY 12 HOURS
Qty: 10 CAPSULE | Refills: 0 | Status: SHIPPED | OUTPATIENT
Start: 2023-09-04 | End: 2023-09-11

## 2023-09-04 RX ORDER — BENZONATATE 100 MG/1
100 CAPSULE ORAL 3 TIMES DAILY PRN
Qty: 60 CAPSULE | Refills: 0 | Status: SHIPPED | OUTPATIENT
Start: 2023-09-04

## 2023-09-04 ASSESSMENT — ENCOUNTER SYMPTOMS
FLANK PAIN: 1
FEVER: 1
COUGH: 1
NAUSEA: 0
DIZZINESS: 0
WHEEZING: 0
SORE THROAT: 0
CONSTIPATION: 0
VOMITING: 0
HEADACHES: 1
DIARRHEA: 0
SHORTNESS OF BREATH: 0
PALPITATIONS: 0
ABDOMINAL PAIN: 0
BACK PAIN: 1

## 2023-09-04 ASSESSMENT — FIBROSIS 4 INDEX: FIB4 SCORE: 1.25

## 2023-09-04 NOTE — ADDENDUM NOTE
Addended by: SHANTELLE PIEDRA on: 9/4/2023 02:59 PM     Modules accepted: Orders, Level of Service

## 2023-09-04 NOTE — PROGRESS NOTES
"Subjective     Sue Puja HO is a 73 y.o. female who presents with Cough (Fever,lower back pain,x1 day)            Sue is a 73 y.o. female who presents to urgent care with fever, cough and lower back pain.  Symptoms started yesterday.  Fever of 101F at home.  Patient has taken OTC Tylenol which has helped with fever.  No shortness of breath/wheezing.  No abdominal pain, N/V/D or urinary symptoms.      Review of Systems   Constitutional:  Positive for fever and malaise/fatigue.   HENT:  Negative for congestion, ear pain and sore throat.    Respiratory:  Positive for cough. Negative for shortness of breath and wheezing.    Cardiovascular:  Negative for chest pain and palpitations.   Gastrointestinal:  Negative for abdominal pain, constipation, diarrhea, nausea and vomiting.   Genitourinary:  Positive for flank pain and urgency. Negative for dysuria, frequency and hematuria.   Musculoskeletal:  Positive for back pain.   Neurological:  Positive for headaches. Negative for dizziness.   All other systems reviewed and are negative.             Objective     /60 (BP Location: Right arm, Patient Position: Sitting, BP Cuff Size: Adult)   Pulse 93   Temp 37.4 °C (99.3 °F) (Temporal)   Resp 20   Ht 1.549 m (5' 1\")   Wt 63.4 kg (139 lb 12.8 oz)   SpO2 95%   BMI 26.41 kg/m²      Physical Exam  Vitals reviewed.   Constitutional:       General: She is not in acute distress.     Appearance: Normal appearance. She is ill-appearing. She is not toxic-appearing.   HENT:      Head: Normocephalic and atraumatic.      Right Ear: Tympanic membrane, ear canal and external ear normal.      Left Ear: Tympanic membrane, ear canal and external ear normal.      Nose: Rhinorrhea present.      Mouth/Throat:      Mouth: Mucous membranes are moist.      Pharynx: Oropharynx is clear. Posterior oropharyngeal erythema present.   Eyes:      Extraocular Movements: Extraocular movements intact.      Conjunctiva/sclera: Conjunctivae " normal.      Pupils: Pupils are equal, round, and reactive to light.   Cardiovascular:      Rate and Rhythm: Normal rate and regular rhythm.   Pulmonary:      Effort: Pulmonary effort is normal.      Breath sounds: Normal breath sounds.   Abdominal:      General: Abdomen is flat. Bowel sounds are normal. There is no distension.      Palpations: Abdomen is soft.      Tenderness: There is no abdominal tenderness. There is no right CVA tenderness, left CVA tenderness, guarding or rebound.   Skin:     General: Skin is warm and dry.   Neurological:      General: No focal deficit present.      Mental Status: She is alert. Mental status is at baseline.                             Assessment & Plan        1. Fever, unspecified fever cause  - POCT CoV-2, Flu A/B, RSV by PCR: POSITIVE COVID-19    2. Viral URI with cough  - POCT CoV-2, Flu A/B, RSV by PCR  - benzonatate (TESSALON) 100 MG Cap; Take 1 Capsule by mouth 3 times a day as needed for Cough.  Dispense: 60 Capsule; Refill: 0    3. COVID-19  - molnupiravir 200 MG capsule; Take 4 Capsules by mouth 2 times a day for 5 days.  Dispense: 40 Capsule; Refill: 0    4. Acute cystitis with hematuria  POCT Urinalysis with moderate leukocyte esterase and large blood.  - cefdinir (OMNICEF) 300 MG Cap; Take 1 Capsule by mouth every 12 hours for 7 days.  Dispense: 10 Capsule; Refill: 0  - URINE CULTURE(NEW); Future     Differential diagnoses, supportive care measures and indications for immediate follow-up discussed with patient. Pathogenesis of diagnosis discussed including typical length and natural progression.      My total time spent caring for the patient on the day of the encounter was 35 minutes including obtaining patient history, physical exam, discussing plan of care, supportive care measures, appropriate follow-up and indications for immediate follow-up. This does not include time spent on separately billable procedures/tests.     Instructed to return to urgent care or  nearest emergency department if symptoms fail to improve, for any change in condition, further concerns, or new concerning symptoms.    Patient states understanding and agrees with the plan of care and discharge instructions.

## 2023-09-05 ENCOUNTER — TELEPHONE (OUTPATIENT)
Dept: MEDICAL GROUP | Facility: PHYSICIAN GROUP | Age: 73
End: 2023-09-05
Payer: COMMERCIAL

## 2023-09-05 NOTE — TELEPHONE ENCOUNTER
Elviradezviridiana went to Urgent care and tested positive ofr covid on Sunday and got medication but is needing a note to be off work from Sat Sept 9, 2023 thru 9-14

## 2023-09-06 LAB
BACTERIA UR CULT: NORMAL
SIGNIFICANT IND 70042: NORMAL
SITE SITE: NORMAL
SOURCE SOURCE: NORMAL

## 2024-01-17 DIAGNOSIS — E78.5 DYSLIPIDEMIA: ICD-10-CM

## 2024-01-18 RX ORDER — ATORVASTATIN CALCIUM 40 MG/1
40 TABLET, FILM COATED ORAL EVERY EVENING
Qty: 90 TABLET | Refills: 2 | Status: SHIPPED | OUTPATIENT
Start: 2024-01-18 | End: 2024-02-22

## 2024-02-16 ENCOUNTER — HOSPITAL ENCOUNTER (OUTPATIENT)
Dept: LAB | Facility: MEDICAL CENTER | Age: 74
End: 2024-02-16
Payer: COMMERCIAL

## 2024-02-16 DIAGNOSIS — E11.65 TYPE 2 DIABETES MELLITUS WITH HYPERGLYCEMIA, WITHOUT LONG-TERM CURRENT USE OF INSULIN (HCC): ICD-10-CM

## 2024-02-16 DIAGNOSIS — E78.5 DYSLIPIDEMIA ASSOCIATED WITH TYPE 2 DIABETES MELLITUS (HCC): ICD-10-CM

## 2024-02-16 DIAGNOSIS — E11.69 DYSLIPIDEMIA ASSOCIATED WITH TYPE 2 DIABETES MELLITUS (HCC): ICD-10-CM

## 2024-02-16 DIAGNOSIS — I15.2 HYPERTENSION ASSOCIATED WITH TYPE 2 DIABETES MELLITUS (HCC): ICD-10-CM

## 2024-02-16 DIAGNOSIS — E11.59 HYPERTENSION ASSOCIATED WITH TYPE 2 DIABETES MELLITUS (HCC): ICD-10-CM

## 2024-02-16 LAB
ALBUMIN SERPL BCP-MCNC: 4.3 G/DL (ref 3.2–4.9)
ALBUMIN/GLOB SERPL: 1.3 G/DL
ALP SERPL-CCNC: 54 U/L (ref 30–99)
ALT SERPL-CCNC: 21 U/L (ref 2–50)
ANION GAP SERPL CALC-SCNC: 11 MMOL/L (ref 7–16)
AST SERPL-CCNC: 23 U/L (ref 12–45)
BILIRUB SERPL-MCNC: 0.6 MG/DL (ref 0.1–1.5)
BUN SERPL-MCNC: 13 MG/DL (ref 8–22)
CALCIUM ALBUM COR SERPL-MCNC: 9.3 MG/DL (ref 8.5–10.5)
CALCIUM SERPL-MCNC: 9.5 MG/DL (ref 8.5–10.5)
CHLORIDE SERPL-SCNC: 105 MMOL/L (ref 96–112)
CHOLEST SERPL-MCNC: 217 MG/DL (ref 100–199)
CO2 SERPL-SCNC: 25 MMOL/L (ref 20–33)
CREAT SERPL-MCNC: 0.59 MG/DL (ref 0.5–1.4)
EST. AVERAGE GLUCOSE BLD GHB EST-MCNC: 143 MG/DL
GFR SERPLBLD CREATININE-BSD FMLA CKD-EPI: 96 ML/MIN/1.73 M 2
GLOBULIN SER CALC-MCNC: 3.2 G/DL (ref 1.9–3.5)
GLUCOSE SERPL-MCNC: 98 MG/DL (ref 65–99)
HBA1C MFR BLD: 6.6 % (ref 4–5.6)
HDLC SERPL-MCNC: 68 MG/DL
LDLC SERPL CALC-MCNC: 131 MG/DL
POTASSIUM SERPL-SCNC: 4.1 MMOL/L (ref 3.6–5.5)
PROT SERPL-MCNC: 7.5 G/DL (ref 6–8.2)
SODIUM SERPL-SCNC: 141 MMOL/L (ref 135–145)
TRIGL SERPL-MCNC: 91 MG/DL (ref 0–149)

## 2024-02-16 PROCEDURE — 80053 COMPREHEN METABOLIC PANEL: CPT

## 2024-02-16 PROCEDURE — 36415 COLL VENOUS BLD VENIPUNCTURE: CPT

## 2024-02-16 PROCEDURE — 83036 HEMOGLOBIN GLYCOSYLATED A1C: CPT | Mod: GA

## 2024-02-16 PROCEDURE — 80061 LIPID PANEL: CPT

## 2024-02-22 ENCOUNTER — OFFICE VISIT (OUTPATIENT)
Dept: MEDICAL GROUP | Facility: PHYSICIAN GROUP | Age: 74
End: 2024-02-22
Payer: COMMERCIAL

## 2024-02-22 VITALS
TEMPERATURE: 98.6 F | HEART RATE: 75 BPM | SYSTOLIC BLOOD PRESSURE: 120 MMHG | OXYGEN SATURATION: 96 % | HEIGHT: 62 IN | WEIGHT: 143.2 LBS | DIASTOLIC BLOOD PRESSURE: 60 MMHG | BODY MASS INDEX: 26.35 KG/M2

## 2024-02-22 DIAGNOSIS — I15.2 HYPERTENSION ASSOCIATED WITH TYPE 2 DIABETES MELLITUS (HCC): ICD-10-CM

## 2024-02-22 DIAGNOSIS — R31.9 HEMATURIA, UNSPECIFIED TYPE: ICD-10-CM

## 2024-02-22 DIAGNOSIS — M19.90 ARTHRITIS: ICD-10-CM

## 2024-02-22 DIAGNOSIS — R79.89 LOW TSH LEVEL: ICD-10-CM

## 2024-02-22 DIAGNOSIS — E78.5 DYSLIPIDEMIA ASSOCIATED WITH TYPE 2 DIABETES MELLITUS (HCC): ICD-10-CM

## 2024-02-22 DIAGNOSIS — E11.69 DYSLIPIDEMIA ASSOCIATED WITH TYPE 2 DIABETES MELLITUS (HCC): ICD-10-CM

## 2024-02-22 DIAGNOSIS — E11.65 TYPE 2 DIABETES MELLITUS WITH HYPERGLYCEMIA, WITHOUT LONG-TERM CURRENT USE OF INSULIN (HCC): ICD-10-CM

## 2024-02-22 DIAGNOSIS — E11.59 HYPERTENSION ASSOCIATED WITH TYPE 2 DIABETES MELLITUS (HCC): ICD-10-CM

## 2024-02-22 PROCEDURE — 3074F SYST BP LT 130 MM HG: CPT

## 2024-02-22 PROCEDURE — 3078F DIAST BP <80 MM HG: CPT

## 2024-02-22 PROCEDURE — 99214 OFFICE O/P EST MOD 30 MIN: CPT

## 2024-02-22 RX ORDER — MELOXICAM 7.5 MG/1
7.5 TABLET ORAL DAILY
Qty: 90 TABLET | Refills: 3 | Status: SHIPPED | OUTPATIENT
Start: 2024-02-22

## 2024-02-22 RX ORDER — ROSUVASTATIN CALCIUM 20 MG/1
20 TABLET, COATED ORAL EVERY EVENING
Qty: 30 TABLET | Refills: 11 | Status: SHIPPED | OUTPATIENT
Start: 2024-02-22

## 2024-02-22 ASSESSMENT — FIBROSIS 4 INDEX: FIB4 SCORE: 1.44

## 2024-02-22 ASSESSMENT — PATIENT HEALTH QUESTIONNAIRE - PHQ9: CLINICAL INTERPRETATION OF PHQ2 SCORE: 0

## 2024-02-22 NOTE — PROGRESS NOTES
Verbal consent was acquired by the patient to use [employer] ambient listening note generation during this visit  Subjective:     CC: follow up    HPI:   Sue presents today with  History of Present Illness  The patient presents for evaluation of multiple medical concerns. She is accompanied by an adult female.    She has pain in her back and knees due to arthritis. She takes naproxen as needed which does help with her pain. She always has pain in both knees.    She is on metformin. She denies any diarrhea. She had diarrhea sometimes because of eating different foods.    She is taking atorvastatin, but it does not help much. She eats a lot of junk food.   Her mother had heart attacks and strokes. Her mother also  due to feeling hot and cold.   She has received 5 COVID-19 vaccines.She had blood in her urine last time.      Problem   Arthritis    Multi joint complaints over many years, progressive  Taking daily Naproxen     Hypertension Associated With Type 2 Diabetes Mellitus (Hcc)    Chronic stable condition for which patient takes losartan 50 mg daily and metoprolol 25 mg daily.  No reported side effects. Blood pressure today in clinic is 120/60     Type 2 Diabetes Mellitus With Hyperglycemia, Without Long-Term Current Use of Insulin (Hcc)    Chronic condition stable  - A1c 6.6%.    -Up-to-date on diabetic screenings  - Taking metformin 500 mg twice daily.  Doing well on this medication without reported side effects.  Eats a healthy diet and exercises.           Dyslipidemia Associated With Type 2 Diabetes Mellitus (Hcc)    Chronic condition  Patient taking atorvastatin 40 mg daily and eating a healthy diet and getting adequate exercise.  No side effects reported from medication.  Lab Results   Component Value Date/Time    CHOLSTRLTOT 217 (H) 2024 08:40 AM    CHOLSTRLTOT 188 2023 08:17 AM     (H) 2024 08:40 AM     (H) 2023 08:17 AM    HDL 68 2024 08:40 AM    HDL 57  "08/05/2023 08:17 AM    TRIGLYCERIDE 91 02/16/2024 08:40 AM    TRIGLYCERIDE 144 08/05/2023 08:17 AM                  ROS:  Review of Systems   Musculoskeletal:  Positive for joint pain.       Objective:     Exam:  /60 (BP Location: Left arm, Patient Position: Sitting, BP Cuff Size: Adult)   Pulse 75   Temp 37 °C (98.6 °F) (Temporal)   Ht 1.575 m (5' 2\")   Wt 65 kg (143 lb 3.2 oz)   SpO2 96%   BMI 26.19 kg/m²  Body mass index is 26.19 kg/m².    Physical Exam    Labs:     Assessment & Plan: Medical Decision Making     73 y.o. female with the following -   Assessment & Plan  1. Arthritis.  I will prescribe meloxicam 7.5 mg once a day to be taken with food. She can take Tylenol with it, but she can not take Advil, Aleve, or Motrin. If it makes her stomach hurt, we will stop the medication.    2. Diabetes.  Her A1c has increased from 6.4 percent to 6.6 percent. She is up to date on all of her diabetic screenings. She will continue metformin.    3. Hyperlipidemia.  Her cholesterol went up slightly to 131. Her kidney function is very good at 96. She will stop atorvastatin. I will start her on rosuvastatin.    4. Health maintenance.  I will check A1c, metabolic panel, lipid panel, thyroid, CRP, lipo B, and urinalysis.    Follow-up  The patient will follow up in 4 months.    Problem List Items Addressed This Visit       Dyslipidemia associated with type 2 diabetes mellitus (HCC)     Chronic unstable condition  - discontinue atorvastatin and start rosuvastatin 20 mg nightly  - Continue efforts towards healthy diet and exercise as discussed  -Repeat lipid panel in 4 months         Relevant Medications    rosuvastatin (CRESTOR) 20 MG Tab    Other Relevant Orders    HEMOGLOBIN A1C    Comp Metabolic Panel    Lipid Profile    CRP HIGH SENSITIVE (CARDIAC)    Lipoprotein (a)    APOLIPOPROTEIN B    Low TSH level    Relevant Orders    TSH WITH REFLEX TO FT4    Type 2 diabetes mellitus with hyperglycemia, without long-term " current use of insulin (HCC)     Chronic stable condition   - Continue metformin 500 mg twice daily  - Continue efforts towards healthy diet and exercise  - Repeat hemoglobin A1c in 4 months         Hypertension associated with type 2 diabetes mellitus (HCC)     Chronic, stable  - Continue metoprolol 25 mg daily and losartan 50 mg daily           Relevant Medications    rosuvastatin (CRESTOR) 20 MG Tab    Other Relevant Orders    HEMOGLOBIN A1C    Comp Metabolic Panel    Lipid Profile    Arthritis     Chronic uncontrolled  Will provide with low dose Meloxicam 7.5 mg daily, side effects discussed           Relevant Medications    meloxicam (MOBIC) 7.5 MG Tab     Other Visit Diagnoses       Hematuria, unspecified type        Relevant Orders    URINALYSIS,CULTURE IF INDICATED          HCC Gap Form    Diagnosis to address: E11.69, E78.5 - Dyslipidemia associated with type 2 diabetes mellitus (HCC)  Assessment and plan: Chronic, exacerbated. Treatment and follow up: medication change  Diagnosis: E11.65 - Type 2 diabetes mellitus with hyperglycemia, without long-term current use of insulin (HCC)  Assessment and plan: Chronic, stable. Continue with current defined treatment plan: . Follow-up at least annually.  Diagnosis: E11.59, I15.2 - Hypertension associated with type 2 diabetes mellitus (HCC)  Assessment and plan: Chronic, stable. Continue with current defined treatment plan: . Follow-up at least annually.  Last edited 02/22/24 17:23 PST by ALTHEA OconnorP.         Differential diagnosis, natural history, supportive care, and indications for immediate follow-up discussed.  Shared decision making approach utilized, and patient is amendable with plan of care.  Patient understands to return to clinic or go to the emergency department if symptoms worsen. All questions and concerns addressed to the best of my knowledge.    Return in about 4 months (around 6/22/2024).    Please note that this dictation was created using  voice recognition software. I have made every reasonable attempt to correct obvious errors, but I expect that there are errors of grammar and possibly content that I did not discover before finalizing the note.

## 2024-02-23 NOTE — ASSESSMENT & PLAN NOTE
Chronic stable condition   - Continue metformin 500 mg twice daily  - Continue efforts towards healthy diet and exercise  - Repeat hemoglobin A1c in 4 months

## 2024-02-23 NOTE — ASSESSMENT & PLAN NOTE
Chronic unstable condition  - discontinue atorvastatin and start rosuvastatin 20 mg nightly  - Continue efforts towards healthy diet and exercise as discussed  -Repeat lipid panel in 4 months

## 2024-06-27 ENCOUNTER — APPOINTMENT (OUTPATIENT)
Dept: MEDICAL GROUP | Facility: PHYSICIAN GROUP | Age: 74
End: 2024-06-27
Payer: COMMERCIAL

## 2024-07-02 DIAGNOSIS — E11.29 TYPE 2 DIABETES MELLITUS WITH MICROALBUMINURIA, WITHOUT LONG-TERM CURRENT USE OF INSULIN (HCC): ICD-10-CM

## 2024-07-02 DIAGNOSIS — R80.9 TYPE 2 DIABETES MELLITUS WITH MICROALBUMINURIA, WITHOUT LONG-TERM CURRENT USE OF INSULIN (HCC): ICD-10-CM

## 2024-08-09 ENCOUNTER — OFFICE VISIT (OUTPATIENT)
Dept: URGENT CARE | Facility: PHYSICIAN GROUP | Age: 74
End: 2024-08-09
Payer: COMMERCIAL

## 2024-08-09 VITALS
RESPIRATION RATE: 18 BRPM | HEART RATE: 98 BPM | BODY MASS INDEX: 28.39 KG/M2 | HEIGHT: 60 IN | OXYGEN SATURATION: 97 % | SYSTOLIC BLOOD PRESSURE: 128 MMHG | WEIGHT: 144.6 LBS | TEMPERATURE: 98.9 F | DIASTOLIC BLOOD PRESSURE: 88 MMHG

## 2024-08-09 DIAGNOSIS — U07.1 COVID-19: ICD-10-CM

## 2024-08-09 DIAGNOSIS — R05.1 ACUTE COUGH: ICD-10-CM

## 2024-08-09 LAB
FLUAV RNA SPEC QL NAA+PROBE: NEGATIVE
FLUBV RNA SPEC QL NAA+PROBE: NEGATIVE
RSV RNA SPEC QL NAA+PROBE: NEGATIVE
SARS-COV-2 RNA RESP QL NAA+PROBE: POSITIVE

## 2024-08-09 PROCEDURE — 3079F DIAST BP 80-89 MM HG: CPT | Performed by: NURSE PRACTITIONER

## 2024-08-09 PROCEDURE — 99214 OFFICE O/P EST MOD 30 MIN: CPT | Performed by: NURSE PRACTITIONER

## 2024-08-09 PROCEDURE — 3074F SYST BP LT 130 MM HG: CPT | Performed by: NURSE PRACTITIONER

## 2024-08-09 PROCEDURE — 0241U POCT CEPHEID COV-2, FLU A/B, RSV - PCR: CPT | Performed by: NURSE PRACTITIONER

## 2024-08-09 PROCEDURE — 1126F AMNT PAIN NOTED NONE PRSNT: CPT | Performed by: NURSE PRACTITIONER

## 2024-08-09 ASSESSMENT — ENCOUNTER SYMPTOMS
FEVER: 1
EYE DISCHARGE: 0
HEADACHES: 1
COUGH: 1
MYALGIAS: 0
SORE THROAT: 0
CHILLS: 1
ORTHOPNEA: 0
NAUSEA: 0
WHEEZING: 0
SHORTNESS OF BREATH: 0
DIARRHEA: 0
SPUTUM PRODUCTION: 1

## 2024-08-09 ASSESSMENT — PAIN SCALES - GENERAL: PAINLEVEL: NO PAIN

## 2024-08-09 NOTE — PROGRESS NOTES
Subjective     Sue Can is a 74 y.o. female who presents with Cough (X 3 day fatigue ) and Fever            HPI  New problem.  Patient is a 74-year-old female who presents with 3-day history of cough, fatigue, and fever as well as nasal congestion.  She reports fevers up to 101 last night.  She denies any shortness of breath, or wheezing.  She denies sore throat, vomiting, or diarrhea.  She has not done any home COVID testing.  She is taking Robitussin for her symptoms.  Lisinopril  Current Outpatient Medications on File Prior to Visit   Medication Sig Dispense Refill    metFORMIN (GLUCOPHAGE) 500 MG Tab TAKE 1 TABLET BY MOUTH TWICE DAILY WITH MEALS 180 Tablet 1    Non Formulary Request Dorsoamide/Timolol 22.3 mg/6.8mg      rosuvastatin (CRESTOR) 20 MG Tab Take 1 Tablet by mouth every evening. 30 Tablet 11    meloxicam (MOBIC) 7.5 MG Tab Take 1 Tablet by mouth every day. 90 Tablet 3    losartan (COZAAR) 50 MG Tab Take 1 tablet by mouth once daily 90 Tablet 3    allopurinol (ZYLOPRIM) 300 MG Tab Take 1 tablet by mouth once daily 90 Tablet 3    metoprolol SR (TOPROL XL) 25 MG TABLET SR 24 HR Take 1 tablet by mouth once daily 90 Tablet 3    benzonatate (TESSALON) 100 MG Cap Take 1 Capsule by mouth 3 times a day as needed for Cough. 60 Capsule 0    CALCIUM PO Take  by mouth.      Cholecalciferol (VITAMIN D-3 PO) Take  by mouth.      Ascorbic Acid (VITAMIN C PO) Take  by mouth.      latanoprost (XALATAN) 0.005 % Solution INSTILL 1 DROP INTO EACH EYE AT BEDTIME       No current facility-administered medications on file prior to visit.     Social History     Socioeconomic History    Marital status: Single     Spouse name: Not on file    Number of children: 0    Years of education: Not on file    Highest education level: Some college, no degree   Occupational History    Occupation: [employer]     Employer: retired   Tobacco Use    Smoking status: Never    Smokeless tobacco: Never   Vaping Use     Vaping status: Never Used   Substance and Sexual Activity    Alcohol use: Yes     Alcohol/week: 0.0 oz     Comment: occasional    Drug use: No    Sexual activity: Never     Partners: Male   Other Topics Concern    Not on file   Social History Narrative    Not on file     Social Determinants of Health     Financial Resource Strain: Low Risk  (10/20/2022)    Overall Financial Resource Strain (CARDIA)     Difficulty of Paying Living Expenses: Not very hard   Food Insecurity: No Food Insecurity (10/20/2022)    Hunger Vital Sign     Worried About Running Out of Food in the Last Year: Never true     Ran Out of Food in the Last Year: Never true   Transportation Needs: No Transportation Needs (10/20/2022)    PRAPARE - Transportation     Lack of Transportation (Medical): No     Lack of Transportation (Non-Medical): No   Physical Activity: Insufficiently Active (10/20/2022)    Exercise Vital Sign     Days of Exercise per Week: 2 days     Minutes of Exercise per Session: 10 min   Stress: Stress Concern Present (10/20/2022)    [employer] [employer] - Occupational Stress Questionnaire     Feeling of Stress : To some extent   Social Connections: Moderately Isolated (10/20/2022)    Social Connection and Isolation Panel [NHANES]     Frequency of Communication with Friends and Family: More than three times a week     Frequency of Social Gatherings with Friends and Family: Once a week     Attends Muslim Services: More than 4 times per year     Active Member of Clubs or Organizations: No     Attends Club or Organization Meetings: Never     Marital Status: Never    Intimate Partner Violence: Not on file   Housing Stability: Low Risk  (10/20/2022)    Housing Stability Vital Sign     Unable to Pay for Housing in the Last Year: No     Number of Places Lived in the Last Year: 1     Unstable Housing in the Last Year: No     Breast Cancer-related family history is not on file.      Review of Systems    Constitutional:  Positive for chills, fever and malaise/fatigue.   HENT:  Positive for congestion. Negative for sore throat.    Eyes:  Negative for discharge.   Respiratory:  Positive for cough and sputum production. Negative for shortness of breath and wheezing.    Cardiovascular:  Negative for chest pain and orthopnea.   Gastrointestinal:  Negative for diarrhea and nausea.   Musculoskeletal:  Negative for myalgias.   Neurological:  Positive for headaches.   Endo/Heme/Allergies:  Negative for environmental allergies.   All other systems reviewed and are negative.             Objective     /88 (BP Location: Left arm, Patient Position: Sitting, BP Cuff Size: Adult)   Pulse 98   Temp 37.2 °C (98.9 °F) (Skin)   Resp 18   Ht 1.524 m (5')   Wt 65.6 kg (144 lb 9.6 oz)   SpO2 97%   BMI 28.24 kg/m²      Physical Exam  Constitutional:       General: She is not in acute distress.     Appearance: Normal appearance. She is well-developed.   HENT:      Head: Normocephalic.      Right Ear: Tympanic membrane and external ear normal.      Left Ear: Tympanic membrane and external ear normal.      Nose: Mucosal edema, congestion and rhinorrhea present.   Eyes:      General:         Right eye: No discharge.         Left eye: No discharge.      Conjunctiva/sclera: Conjunctivae normal.   Cardiovascular:      Rate and Rhythm: Normal rate and regular rhythm.      Heart sounds: Normal heart sounds.   Pulmonary:      Effort: Pulmonary effort is normal.      Breath sounds: Normal breath sounds. No wheezing or rales.   Musculoskeletal:         General: Normal range of motion.      Cervical back: Normal range of motion and neck supple.   Lymphadenopathy:      Cervical: No cervical adenopathy.   Skin:     General: Skin is warm and dry.      Capillary Refill: Capillary refill takes less than 2 seconds.   Neurological:      Mental Status: She is alert and oriented to person, place, and time.   Psychiatric:         Behavior:  Behavior normal.         Thought Content: Thought content normal.                             Assessment & Plan        1. COVID-19  Nirmatrelvir&Ritonavir 300/100 20 x 150 MG & 10 x 100MG Tablet Therapy Pack      2. Acute cough  POCT CoV-2, Flu A/B, RSV by PCR        Positive covid.  Paxlovid (last eGFR: 96).  Reviewed with patient symptomatic care, rest and fluids.  Work note for next 3 days.   Differential diagnosis, natural history, supportive care, and indications for immediate follow-up were discussed.

## 2024-08-09 NOTE — LETTER
August 9, 2024        Sue Puja Can  [address] [place] [address]        Sue was seen in our clinic today and she is excused from work for tonite, tomorrow and Sunday.   If you have any questions or concerns, please don't hesitate to call.        Sincerely,        Saul Roa A.P.N.    Electronically Signed

## 2024-08-09 NOTE — LETTER
August 9, 2024        Sue Luo Juan José  [address] [place] [address]        Sue was seen in our clinic and diagnosed with covid 19. She is excused from work for today through Monday.  If you have any questions or concerns, please don't hesitate to call.        Sincerely,        Saul Roa A.P.N.    Electronically Signed

## 2024-10-12 DIAGNOSIS — M10.9 GOUT, ARTHRITIS: ICD-10-CM

## 2024-10-12 DIAGNOSIS — I10 ESSENTIAL HYPERTENSION: ICD-10-CM

## 2024-10-12 DIAGNOSIS — R80.9 TYPE 2 DIABETES MELLITUS WITH MICROALBUMINURIA, WITHOUT LONG-TERM CURRENT USE OF INSULIN (HCC): ICD-10-CM

## 2024-10-12 DIAGNOSIS — E11.29 TYPE 2 DIABETES MELLITUS WITH MICROALBUMINURIA, WITHOUT LONG-TERM CURRENT USE OF INSULIN (HCC): ICD-10-CM

## 2024-10-14 DIAGNOSIS — I10 ESSENTIAL HYPERTENSION: ICD-10-CM

## 2024-10-14 RX ORDER — METOPROLOL SUCCINATE 25 MG/1
25 TABLET, EXTENDED RELEASE ORAL DAILY
Qty: 90 TABLET | Refills: 0 | Status: SHIPPED | OUTPATIENT
Start: 2024-10-14 | End: 2024-10-14 | Stop reason: SDUPTHER

## 2024-10-14 RX ORDER — LOSARTAN POTASSIUM 50 MG/1
50 TABLET ORAL DAILY
Qty: 90 TABLET | Refills: 0 | Status: SHIPPED | OUTPATIENT
Start: 2024-10-14

## 2024-10-14 RX ORDER — METOPROLOL SUCCINATE 25 MG/1
25 TABLET, EXTENDED RELEASE ORAL DAILY
Qty: 90 TABLET | Refills: 1 | Status: SHIPPED | OUTPATIENT
Start: 2024-10-14

## 2024-10-14 RX ORDER — ALLOPURINOL 300 MG/1
300 TABLET ORAL DAILY
Qty: 90 TABLET | Refills: 0 | Status: SHIPPED | OUTPATIENT
Start: 2024-10-14

## 2024-12-20 DIAGNOSIS — M10.9 GOUT, ARTHRITIS: ICD-10-CM

## 2024-12-20 DIAGNOSIS — I10 ESSENTIAL HYPERTENSION: ICD-10-CM

## 2024-12-23 RX ORDER — LOSARTAN POTASSIUM 50 MG/1
50 TABLET ORAL DAILY
Qty: 90 TABLET | Refills: 0 | Status: SHIPPED | OUTPATIENT
Start: 2024-12-23

## 2024-12-23 RX ORDER — ALLOPURINOL 300 MG/1
300 TABLET ORAL DAILY
Qty: 90 TABLET | Refills: 0 | Status: SHIPPED | OUTPATIENT
Start: 2024-12-23

## 2024-12-23 NOTE — TELEPHONE ENCOUNTER
Received request via: Patient    Was the patient seen in the last year in this department? Yes    Does the patient have an active prescription (recently filled or refills available) for medication(s) requested? No    Pharmacy Name:     Does the patient have detention Plus and need 100-day supply? (This applies to ALL medications) Patient does not have SCP

## 2024-12-26 ENCOUNTER — HOSPITAL ENCOUNTER (OUTPATIENT)
Dept: RADIOLOGY | Facility: MEDICAL CENTER | Age: 74
End: 2024-12-26
Payer: MEDICARE

## 2024-12-26 DIAGNOSIS — Z12.31 VISIT FOR SCREENING MAMMOGRAM: ICD-10-CM

## 2024-12-26 PROCEDURE — 77067 SCR MAMMO BI INCL CAD: CPT

## 2025-02-07 ENCOUNTER — HOSPITAL ENCOUNTER (OUTPATIENT)
Dept: LAB | Facility: MEDICAL CENTER | Age: 75
End: 2025-02-07
Payer: COMMERCIAL

## 2025-02-07 DIAGNOSIS — E11.69 DYSLIPIDEMIA ASSOCIATED WITH TYPE 2 DIABETES MELLITUS (HCC): ICD-10-CM

## 2025-02-07 DIAGNOSIS — E11.59 HYPERTENSION ASSOCIATED WITH TYPE 2 DIABETES MELLITUS (HCC): ICD-10-CM

## 2025-02-07 DIAGNOSIS — R79.89 LOW TSH LEVEL: ICD-10-CM

## 2025-02-07 DIAGNOSIS — R31.9 HEMATURIA, UNSPECIFIED TYPE: ICD-10-CM

## 2025-02-07 DIAGNOSIS — E78.5 DYSLIPIDEMIA ASSOCIATED WITH TYPE 2 DIABETES MELLITUS (HCC): ICD-10-CM

## 2025-02-07 DIAGNOSIS — I15.2 HYPERTENSION ASSOCIATED WITH TYPE 2 DIABETES MELLITUS (HCC): ICD-10-CM

## 2025-02-07 LAB
ALBUMIN SERPL BCP-MCNC: 4.4 G/DL (ref 3.2–4.9)
ALBUMIN/GLOB SERPL: 1.2 G/DL
ALP SERPL-CCNC: 51 U/L (ref 30–99)
ALT SERPL-CCNC: 22 U/L (ref 2–50)
ANION GAP SERPL CALC-SCNC: 11 MMOL/L (ref 7–16)
APPEARANCE UR: ABNORMAL
AST SERPL-CCNC: 30 U/L (ref 12–45)
BACTERIA #/AREA URNS HPF: ABNORMAL /HPF
BILIRUB SERPL-MCNC: 0.5 MG/DL (ref 0.1–1.5)
BILIRUB UR QL STRIP.AUTO: NEGATIVE
BUN SERPL-MCNC: 14 MG/DL (ref 8–22)
CALCIUM ALBUM COR SERPL-MCNC: 9.8 MG/DL (ref 8.5–10.5)
CALCIUM SERPL-MCNC: 10.1 MG/DL (ref 8.5–10.5)
CASTS URNS QL MICRO: ABNORMAL /LPF (ref 0–2)
CHLORIDE SERPL-SCNC: 104 MMOL/L (ref 96–112)
CHOLEST SERPL-MCNC: 128 MG/DL (ref 100–199)
CO2 SERPL-SCNC: 24 MMOL/L (ref 20–33)
COLOR UR: YELLOW
CREAT SERPL-MCNC: 0.76 MG/DL (ref 0.5–1.4)
CRP SERPL HS-MCNC: <0.2 MG/L (ref 0–3)
EPITHELIAL CELLS 1715: ABNORMAL /HPF (ref 0–5)
EST. AVERAGE GLUCOSE BLD GHB EST-MCNC: 166 MG/DL
GFR SERPLBLD CREATININE-BSD FMLA CKD-EPI: 83 ML/MIN/1.73 M 2
GLOBULIN SER CALC-MCNC: 3.6 G/DL (ref 1.9–3.5)
GLUCOSE SERPL-MCNC: 96 MG/DL (ref 65–99)
GLUCOSE UR STRIP.AUTO-MCNC: NEGATIVE MG/DL
HBA1C MFR BLD: 7.4 % (ref 4–5.6)
HDLC SERPL-MCNC: 59 MG/DL
KETONES UR STRIP.AUTO-MCNC: NEGATIVE MG/DL
LDLC SERPL CALC-MCNC: 47 MG/DL
LEUKOCYTE ESTERASE UR QL STRIP.AUTO: ABNORMAL
MICRO URNS: ABNORMAL
NITRITE UR QL STRIP.AUTO: NEGATIVE
PH UR STRIP.AUTO: 5.5 [PH] (ref 5–8)
POTASSIUM SERPL-SCNC: 4.2 MMOL/L (ref 3.6–5.5)
PROT SERPL-MCNC: 8 G/DL (ref 6–8.2)
PROT UR QL STRIP: 30 MG/DL
RBC # URNS HPF: ABNORMAL /HPF (ref 0–2)
RBC UR QL AUTO: ABNORMAL
SODIUM SERPL-SCNC: 139 MMOL/L (ref 135–145)
SP GR UR STRIP.AUTO: 1.01
TRANS CELLS URNS QL MICRO: PRESENT /HPF
TRIGL SERPL-MCNC: 111 MG/DL (ref 0–149)
TSH SERPL DL<=0.005 MIU/L-ACNC: 0.48 UIU/ML (ref 0.38–5.33)
UROBILINOGEN UR STRIP.AUTO-MCNC: 0.2 EU/DL
WBC #/AREA URNS HPF: ABNORMAL /HPF

## 2025-02-07 PROCEDURE — 82172 ASSAY OF APOLIPOPROTEIN: CPT

## 2025-02-07 PROCEDURE — 83695 ASSAY OF LIPOPROTEIN(A): CPT

## 2025-02-07 PROCEDURE — 84443 ASSAY THYROID STIM HORMONE: CPT

## 2025-02-07 PROCEDURE — 81001 URINALYSIS AUTO W/SCOPE: CPT

## 2025-02-07 PROCEDURE — 80053 COMPREHEN METABOLIC PANEL: CPT

## 2025-02-07 PROCEDURE — 83036 HEMOGLOBIN GLYCOSYLATED A1C: CPT | Mod: GA

## 2025-02-07 PROCEDURE — 80061 LIPID PANEL: CPT

## 2025-02-07 PROCEDURE — 36415 COLL VENOUS BLD VENIPUNCTURE: CPT

## 2025-02-07 PROCEDURE — 87086 URINE CULTURE/COLONY COUNT: CPT

## 2025-02-07 PROCEDURE — 86141 C-REACTIVE PROTEIN HS: CPT

## 2025-02-09 DIAGNOSIS — N30.01 ACUTE CYSTITIS WITH HEMATURIA: ICD-10-CM

## 2025-02-09 LAB
APO B100 SERPL-MCNC: 59 MG/DL (ref 60–117)
LPA SERPL-MCNC: 8 MG/DL

## 2025-02-09 RX ORDER — NITROFURANTOIN 25; 75 MG/1; MG/1
100 CAPSULE ORAL 2 TIMES DAILY
Qty: 10 CAPSULE | Refills: 0 | Status: SHIPPED | OUTPATIENT
Start: 2025-02-09

## 2025-02-10 LAB
BACTERIA UR CULT: NORMAL
SIGNIFICANT IND 70042: NORMAL
SITE SITE: NORMAL
SOURCE SOURCE: NORMAL

## 2025-02-13 ENCOUNTER — APPOINTMENT (OUTPATIENT)
Dept: MEDICAL GROUP | Facility: PHYSICIAN GROUP | Age: 75
End: 2025-02-13
Payer: MEDICARE

## 2025-02-13 VITALS
HEART RATE: 73 BPM | TEMPERATURE: 99 F | BODY MASS INDEX: 29.25 KG/M2 | DIASTOLIC BLOOD PRESSURE: 80 MMHG | HEIGHT: 60 IN | WEIGHT: 149 LBS | SYSTOLIC BLOOD PRESSURE: 120 MMHG | OXYGEN SATURATION: 96 %

## 2025-02-13 DIAGNOSIS — Z00.00 HEALTHCARE MAINTENANCE: ICD-10-CM

## 2025-02-13 DIAGNOSIS — M25.561 CHRONIC PAIN OF BOTH KNEES: ICD-10-CM

## 2025-02-13 DIAGNOSIS — G89.29 CHRONIC PAIN OF BOTH KNEES: ICD-10-CM

## 2025-02-13 DIAGNOSIS — E78.5 DYSLIPIDEMIA ASSOCIATED WITH TYPE 2 DIABETES MELLITUS (HCC): ICD-10-CM

## 2025-02-13 DIAGNOSIS — E11.65 TYPE 2 DIABETES MELLITUS WITH HYPERGLYCEMIA, WITHOUT LONG-TERM CURRENT USE OF INSULIN (HCC): ICD-10-CM

## 2025-02-13 DIAGNOSIS — I15.2 HYPERTENSION ASSOCIATED WITH TYPE 2 DIABETES MELLITUS (HCC): ICD-10-CM

## 2025-02-13 DIAGNOSIS — E11.69 DYSLIPIDEMIA ASSOCIATED WITH TYPE 2 DIABETES MELLITUS (HCC): ICD-10-CM

## 2025-02-13 DIAGNOSIS — E11.59 HYPERTENSION ASSOCIATED WITH TYPE 2 DIABETES MELLITUS (HCC): ICD-10-CM

## 2025-02-13 DIAGNOSIS — M25.562 CHRONIC PAIN OF BOTH KNEES: ICD-10-CM

## 2025-02-13 PROCEDURE — 3074F SYST BP LT 130 MM HG: CPT

## 2025-02-13 PROCEDURE — 3079F DIAST BP 80-89 MM HG: CPT

## 2025-02-13 PROCEDURE — 99214 OFFICE O/P EST MOD 30 MIN: CPT

## 2025-02-13 ASSESSMENT — ENCOUNTER SYMPTOMS
ABDOMINAL PAIN: 0
BLURRED VISION: 0
HEADACHES: 0
CHILLS: 0
VOMITING: 0
WEAKNESS: 0
MYALGIAS: 0
COUGH: 0
DIARRHEA: 0
SHORTNESS OF BREATH: 0
WEIGHT LOSS: 0
FEVER: 0
NAUSEA: 0
DIZZINESS: 0
CONSTIPATION: 0

## 2025-02-13 ASSESSMENT — PATIENT HEALTH QUESTIONNAIRE - PHQ9: CLINICAL INTERPRETATION OF PHQ2 SCORE: 0

## 2025-02-14 NOTE — PROGRESS NOTES
Verbal consent was acquired by the patient to use [employer] ambient listening note generation during this visit  Subjective:     CC:  Diagnoses of Hypertension associated with type 2 diabetes mellitus (HCC), Type 2 diabetes mellitus with hyperglycemia, without long-term current use of insulin (HCC), Dyslipidemia associated with type 2 diabetes mellitus (HCC), Healthcare maintenance, and Chronic pain of both knees were pertinent to this visit.    HISTORY OF THE PRESENT ILLNESS: Patient is a 74 y.o. female.   No problems updated.    History of Present Illness  The patient presents for evaluation of diabetes, dyslipidemia, and bilateral knee pain. She is accompanied by her sister.    She has been managing well over the past year, with no significant health issues reported. She underwent laboratory testing last Friday, which revealed some abnormalities. She reports no urinary symptoms, including hematuria or dysuria. She has an upcoming ophthalmology appointment scheduled for April 2024. She occasionally experiences tingling sensations in her feet. She has not had any recent episodes of gout. She has received all her vaccinations, including the COVID-19 vaccine. She has maintained good health throughout the year, with no reported illnesses or influenza infections.    She reports experiencing bilateral knee pain, which is severe enough to impede her ability to stand from a seated position. She has been managing the pain with Mobic, taken as needed.    SOCIAL HISTORY  She works at [employer], cleaning the machines.    MEDICATIONS  Mobic    IMMUNIZATIONS  She is up to date on all of her vaccines, including COVID-19 and shingles.    ROS:   Review of Systems   Constitutional:  Negative for chills, fever, malaise/fatigue and weight loss.   Eyes:  Negative for blurred vision.   Respiratory:  Negative for cough and shortness of breath.    Cardiovascular:  Negative for chest pain.   Gastrointestinal:  Negative for abdominal  pain, constipation, diarrhea, nausea and vomiting.   Musculoskeletal:  Positive for joint pain. Negative for myalgias.   Neurological:  Negative for dizziness, weakness and headaches.         Objective:     Exam: /80 (BP Location: Left arm, Patient Position: Sitting, BP Cuff Size: Adult)   Pulse 73   Temp 37.2 °C (99 °F) (Temporal)   Ht 1.524 m (5')   Wt 67.6 kg (149 lb)   SpO2 96%  Body mass index is 29.1 kg/m².    Physical Exam  Constitutional:       General: She is not in acute distress.     Appearance: Normal appearance. She is not ill-appearing or toxic-appearing.   HENT:      Head: Normocephalic.   Eyes:      Conjunctiva/sclera: Conjunctivae normal.   Cardiovascular:      Rate and Rhythm: Normal rate and regular rhythm.      Pulses: Normal pulses.      Heart sounds: Normal heart sounds. No murmur heard.  Pulmonary:      Effort: Pulmonary effort is normal. No respiratory distress.      Breath sounds: Normal breath sounds.   Musculoskeletal:         General: Tenderness (bilateral knee) present.   Skin:     General: Skin is warm and dry.   Neurological:      General: No focal deficit present.      Mental Status: She is alert and oriented to person, place, and time.   Psychiatric:         Mood and Affect: Mood normal.         Behavior: Behavior normal.           Labs:   Hospital Outpatient Visit on 02/07/2025   Component Date Value    Color 02/07/2025 Yellow     Character 02/07/2025 Hazy (A)     Specific Gravity 02/07/2025 1.014     Ph 02/07/2025 5.5     Glucose 02/07/2025 Negative     Ketones 02/07/2025 Negative     Protein 02/07/2025 30 (A)     Bilirubin 02/07/2025 Negative     Urobilinogen, Urine 02/07/2025 0.2     Nitrite 02/07/2025 Negative     Leukocyte Esterase 02/07/2025 Moderate (A)     Occult Blood 02/07/2025 Small (A)     Micro Urine Req 02/07/2025 Microscopic     TSH 02/07/2025 0.480     Apolipoprotein-B 02/07/2025 59 (L)     Lipoprotein (a) 02/07/2025 8     C Reactive Protein High *  02/07/2025 <0.2     Cholesterol,Tot 02/07/2025 128     Triglycerides 02/07/2025 111     HDL 02/07/2025 59     LDL 02/07/2025 47     Sodium 02/07/2025 139     Potassium 02/07/2025 4.2     Chloride 02/07/2025 104     Co2 02/07/2025 24     Anion Gap 02/07/2025 11.0     Glucose 02/07/2025 96     Bun 02/07/2025 14     Creatinine 02/07/2025 0.76     Calcium 02/07/2025 10.1     Correct Calcium 02/07/2025 9.8     AST(SGOT) 02/07/2025 30     ALT(SGPT) 02/07/2025 22     Alkaline Phosphatase 02/07/2025 51     Total Bilirubin 02/07/2025 0.5     Albumin 02/07/2025 4.4     Total Protein 02/07/2025 8.0     Globulin 02/07/2025 3.6 (H)     A-G Ratio 02/07/2025 1.2     Glycohemoglobin 02/07/2025 7.4 (H)     Est Avg Glucose 02/07/2025 166     WBC 02/07/2025 11-20 (A)     RBC 02/07/2025 0-2     Bacteria 02/07/2025 None Seen     Epithelial Cells 02/07/2025 0-2     Trans Epithelial Cells 02/07/2025 Present (A)     Urine Casts 02/07/2025 0-2     Significant Indicator 02/07/2025 NEG     Source 02/07/2025 UR     Site 02/07/2025 -     Culture Result 02/07/2025 Usual skin eddie 10-50,000 cfu/mL     GFR (CKD-EPI) 02/07/2025 83          Assessment & Plan: Medical Decision Making   74 y.o. female with the following -    Assessment & Plan  1. Diabetes.  Her A1c levels have shown a slight increase 7.4, likely due to dietary indulgences during the holiday season. She is advised to continue her current medication regimen and maintain a balanced diet. A follow-up appointment is scheduled for August 14, 2024, at 4:40 PM to reassess her A1c levels.  She will continue metformin 500 mg twice daily.    2. Dyslipidemia.  Chronic stable.  Recommend continuation of rosuvastatin 20 mg daily.  Her cholesterol levels are well-managed, and her APO B marker is down, which is excellent. She is advised to continue her current lipid-lowering therapy and maintain a healthy diet.    3. Bilateral knee pain.  Chronic uncontrolled  She reports significant pain in both  knees, which may be indicative of severe arthritis. She is advised to apply ice, elevate her legs, and take Mobic or Tylenol for pain management. An x-ray of her knees will be ordered to further evaluate the condition. A referral to an orthopedic specialist will be made following the x-ray results.    4.  Gout   chronic stable condition  Therefore recommend continuation of allopurinol 300 mg daily    5.  Hypertension  Chronic stable condition with blood pressure today at 120/80.  Patient will continue metoprolol 25 mg daily along with losartan 50 mg daily.    Follow-up  The patient will follow up in August 2024.      Problem List Items Addressed This Visit       Dyslipidemia associated with type 2 diabetes mellitus (HCC)    Relevant Orders    Comp Metabolic Panel    Lipid Profile    Type 2 diabetes mellitus with hyperglycemia, without long-term current use of insulin (HCC)    Relevant Orders    Diabetic Monofilament LE Exam (Completed)    Comp Metabolic Panel    Lipid Profile    HEMOGLOBIN A1C    MICROALBUMIN CREAT RATIO URINE    Hypertension associated with type 2 diabetes mellitus (HCC)    Relevant Orders    Diabetic Monofilament LE Exam (Completed)     Other Visit Diagnoses         Healthcare maintenance        Relevant Orders    Diabetic Monofilament LE Exam (Completed)    Comp Metabolic Panel    Lipid Profile    HEMOGLOBIN A1C    MICROALBUMIN CREAT RATIO URINE      Chronic pain of both knees        Relevant Orders    DX-KNEES-AP BILATERAL STANDING    Referral to Orthopedics            Differential diagnosis, natural history, supportive care, and indications for immediate follow-up discussed.  Shared decision making approach was utilized, and patient is amendable with plan of care.  Patient understands to return to clinic or go to the emergency department if symptoms worsen. All questions and concerns addressed to the best of my knowledge.      Return in about 6 months (around 8/13/2025), or if symptoms worsen  or fail to improve.    Please note that this dictation was created using voice recognition software. I have made every reasonable attempt to correct obvious errors, but I expect that there are errors of grammar and possibly content that I did not discover before finalizing the note.

## 2025-03-03 ENCOUNTER — HOSPITAL ENCOUNTER (INPATIENT)
Facility: MEDICAL CENTER | Age: 75
LOS: 3 days | DRG: 189 | End: 2025-03-06
Attending: STUDENT IN AN ORGANIZED HEALTH CARE EDUCATION/TRAINING PROGRAM | Admitting: STUDENT IN AN ORGANIZED HEALTH CARE EDUCATION/TRAINING PROGRAM
Payer: COMMERCIAL

## 2025-03-03 ENCOUNTER — APPOINTMENT (OUTPATIENT)
Dept: RADIOLOGY | Facility: MEDICAL CENTER | Age: 75
DRG: 189 | End: 2025-03-03
Attending: STUDENT IN AN ORGANIZED HEALTH CARE EDUCATION/TRAINING PROGRAM
Payer: COMMERCIAL

## 2025-03-03 DIAGNOSIS — N12 PYELONEPHRITIS: ICD-10-CM

## 2025-03-03 DIAGNOSIS — R80.9 PROTEINURIA, UNSPECIFIED TYPE: ICD-10-CM

## 2025-03-03 PROBLEM — N10 ACUTE PYELONEPHRITIS: Status: ACTIVE | Noted: 2025-03-03

## 2025-03-03 LAB
ALBUMIN SERPL BCP-MCNC: 4.4 G/DL (ref 3.2–4.9)
ALBUMIN/GLOB SERPL: 1.2 G/DL
ALP SERPL-CCNC: 66 U/L (ref 30–99)
ALT SERPL-CCNC: 20 U/L (ref 2–50)
ANION GAP SERPL CALC-SCNC: 15 MMOL/L (ref 7–16)
APPEARANCE UR: ABNORMAL
APPEARANCE UR: CLEAR
AST SERPL-CCNC: 31 U/L (ref 12–45)
BACTERIA #/AREA URNS HPF: ABNORMAL /HPF
BACTERIA #/AREA URNS HPF: ABNORMAL /HPF
BASOPHILS # BLD AUTO: 0.2 % (ref 0–1.8)
BASOPHILS # BLD: 0.04 K/UL (ref 0–0.12)
BILIRUB SERPL-MCNC: 0.7 MG/DL (ref 0.1–1.5)
BILIRUB UR QL STRIP.AUTO: NEGATIVE
BILIRUB UR QL STRIP.AUTO: NEGATIVE
BUN SERPL-MCNC: 21 MG/DL (ref 8–22)
CALCIUM ALBUM COR SERPL-MCNC: 10 MG/DL (ref 8.5–10.5)
CALCIUM SERPL-MCNC: 10.3 MG/DL (ref 8.5–10.5)
CASTS URNS QL MICRO: ABNORMAL /LPF (ref 0–2)
CASTS URNS QL MICRO: ABNORMAL /LPF (ref 0–2)
CHLORIDE SERPL-SCNC: 105 MMOL/L (ref 96–112)
CO2 SERPL-SCNC: 18 MMOL/L (ref 20–33)
COLOR UR: YELLOW
COLOR UR: YELLOW
CREAT SERPL-MCNC: 0.93 MG/DL (ref 0.5–1.4)
EOSINOPHIL # BLD AUTO: 0.19 K/UL (ref 0–0.51)
EOSINOPHIL NFR BLD: 1.1 % (ref 0–6.9)
EPITHELIAL CELLS 1715: ABNORMAL /HPF (ref 0–5)
EPITHELIAL CELLS 1715: ABNORMAL /HPF (ref 0–5)
EPITHELIAL CELLS RENAL  1715R: PRESENT /HPF
ERYTHROCYTE [DISTWIDTH] IN BLOOD BY AUTOMATED COUNT: 42.4 FL (ref 35.9–50)
FLUAV RNA SPEC QL NAA+PROBE: NEGATIVE
FLUBV RNA SPEC QL NAA+PROBE: NEGATIVE
GFR SERPLBLD CREATININE-BSD FMLA CKD-EPI: 65 ML/MIN/1.73 M 2
GLOBULIN SER CALC-MCNC: 3.8 G/DL (ref 1.9–3.5)
GLUCOSE SERPL-MCNC: 227 MG/DL (ref 65–99)
GLUCOSE UR STRIP.AUTO-MCNC: NEGATIVE MG/DL
GLUCOSE UR STRIP.AUTO-MCNC: NEGATIVE MG/DL
HCT VFR BLD AUTO: 45.3 % (ref 37–47)
HGB BLD-MCNC: 14.9 G/DL (ref 12–16)
HYALINE CAST   1831: PRESENT /LPF
IMM GRANULOCYTES # BLD AUTO: 0.07 K/UL (ref 0–0.11)
IMM GRANULOCYTES NFR BLD AUTO: 0.4 % (ref 0–0.9)
KETONES UR STRIP.AUTO-MCNC: NEGATIVE MG/DL
KETONES UR STRIP.AUTO-MCNC: NEGATIVE MG/DL
LACTATE SERPL-SCNC: 1.7 MMOL/L (ref 0.5–2)
LEUKOCYTE ESTERASE UR QL STRIP.AUTO: ABNORMAL
LEUKOCYTE ESTERASE UR QL STRIP.AUTO: ABNORMAL
LIPASE SERPL-CCNC: 34 U/L (ref 11–82)
LYMPHOCYTES # BLD AUTO: 1.25 K/UL (ref 1–4.8)
LYMPHOCYTES NFR BLD: 7.1 % (ref 22–41)
MCH RBC QN AUTO: 29.6 PG (ref 27–33)
MCHC RBC AUTO-ENTMCNC: 32.9 G/DL (ref 32.2–35.5)
MCV RBC AUTO: 89.9 FL (ref 81.4–97.8)
MICRO URNS: ABNORMAL
MICRO URNS: ABNORMAL
MONOCYTES # BLD AUTO: 0.66 K/UL (ref 0–0.85)
MONOCYTES NFR BLD AUTO: 3.8 % (ref 0–13.4)
NEUTROPHILS # BLD AUTO: 15.35 K/UL (ref 1.82–7.42)
NEUTROPHILS NFR BLD: 87.4 % (ref 44–72)
NITRITE UR QL STRIP.AUTO: NEGATIVE
NITRITE UR QL STRIP.AUTO: NEGATIVE
NRBC # BLD AUTO: 0 K/UL
NRBC BLD-RTO: 0 /100 WBC (ref 0–0.2)
PH UR STRIP.AUTO: 5 [PH] (ref 5–8)
PH UR STRIP.AUTO: 5 [PH] (ref 5–8)
PLATELET # BLD AUTO: 212 K/UL (ref 164–446)
PMV BLD AUTO: 9.9 FL (ref 9–12.9)
POTASSIUM SERPL-SCNC: 3.9 MMOL/L (ref 3.6–5.5)
PROT SERPL-MCNC: 8.2 G/DL (ref 6–8.2)
PROT UR QL STRIP: 100 MG/DL
PROT UR QL STRIP: 100 MG/DL
RBC # BLD AUTO: 5.04 M/UL (ref 4.2–5.4)
RBC # URNS HPF: ABNORMAL /HPF (ref 0–2)
RBC # URNS HPF: ABNORMAL /HPF (ref 0–2)
RBC UR QL AUTO: ABNORMAL
RBC UR QL AUTO: ABNORMAL
RSV RNA SPEC QL NAA+PROBE: NEGATIVE
SARS-COV-2 RNA RESP QL NAA+PROBE: NOTDETECTED
SODIUM SERPL-SCNC: 138 MMOL/L (ref 135–145)
SP GR UR STRIP.AUTO: 1.01
SP GR UR STRIP.AUTO: 1.01
UROBILINOGEN UR STRIP.AUTO-MCNC: 0.2 EU/DL
UROBILINOGEN UR STRIP.AUTO-MCNC: 0.2 EU/DL
WBC # BLD AUTO: 17.6 K/UL (ref 4.8–10.8)
WBC #/AREA URNS HPF: ABNORMAL /HPF
WBC #/AREA URNS HPF: ABNORMAL /HPF

## 2025-03-03 PROCEDURE — 80053 COMPREHEN METABOLIC PANEL: CPT

## 2025-03-03 PROCEDURE — 96374 THER/PROPH/DIAG INJ IV PUSH: CPT

## 2025-03-03 PROCEDURE — 99285 EMERGENCY DEPT VISIT HI MDM: CPT

## 2025-03-03 PROCEDURE — 85025 COMPLETE CBC W/AUTO DIFF WBC: CPT

## 2025-03-03 PROCEDURE — 0241U HCHG SARS-COV-2 COVID-19 NFCT DS RESP RNA 4 TRGT ED POC: CPT

## 2025-03-03 PROCEDURE — 81001 URINALYSIS AUTO W/SCOPE: CPT

## 2025-03-03 PROCEDURE — 770020 HCHG ROOM/CARE - TELE (206)

## 2025-03-03 PROCEDURE — 71045 X-RAY EXAM CHEST 1 VIEW: CPT

## 2025-03-03 PROCEDURE — 99223 1ST HOSP IP/OBS HIGH 75: CPT | Mod: GC,AI | Performed by: STUDENT IN AN ORGANIZED HEALTH CARE EDUCATION/TRAINING PROGRAM

## 2025-03-03 PROCEDURE — 87040 BLOOD CULTURE FOR BACTERIA: CPT | Mod: 91

## 2025-03-03 PROCEDURE — 700105 HCHG RX REV CODE 258: Performed by: STUDENT IN AN ORGANIZED HEALTH CARE EDUCATION/TRAINING PROGRAM

## 2025-03-03 PROCEDURE — 0202U NFCT DS 22 TRGT SARS-COV-2: CPT

## 2025-03-03 PROCEDURE — 83690 ASSAY OF LIPASE: CPT

## 2025-03-03 PROCEDURE — 83605 ASSAY OF LACTIC ACID: CPT

## 2025-03-03 PROCEDURE — 84145 PROCALCITONIN (PCT): CPT

## 2025-03-03 PROCEDURE — 700111 HCHG RX REV CODE 636 W/ 250 OVERRIDE (IP): Performed by: STUDENT IN AN ORGANIZED HEALTH CARE EDUCATION/TRAINING PROGRAM

## 2025-03-03 PROCEDURE — 36415 COLL VENOUS BLD VENIPUNCTURE: CPT

## 2025-03-03 RX ORDER — CEFAZOLIN 2 G/1
2 INJECTION, POWDER, FOR SOLUTION INTRAMUSCULAR; INTRAVENOUS ONCE
Status: COMPLETED | OUTPATIENT
Start: 2025-03-03 | End: 2025-03-03

## 2025-03-03 RX ORDER — SODIUM CHLORIDE 9 MG/ML
1000 INJECTION, SOLUTION INTRAVENOUS ONCE
Status: COMPLETED | OUTPATIENT
Start: 2025-03-03 | End: 2025-03-03

## 2025-03-03 RX ORDER — DORZOLAMIDE HYDROCHLORIDE AND TIMOLOL MALEATE 20; 5 MG/ML; MG/ML
1 SOLUTION/ DROPS OPHTHALMIC 2 TIMES DAILY
COMMUNITY

## 2025-03-03 RX ADMIN — CEFAZOLIN 2 G: 2 INJECTION, POWDER, FOR SOLUTION INTRAMUSCULAR; INTRAVENOUS at 22:14

## 2025-03-03 RX ADMIN — SODIUM CHLORIDE 1000 ML: 9 INJECTION, SOLUTION INTRAVENOUS at 21:49

## 2025-03-03 ASSESSMENT — PAIN DESCRIPTION - PAIN TYPE
TYPE: ACUTE PAIN
TYPE: ACUTE PAIN

## 2025-03-04 ENCOUNTER — APPOINTMENT (OUTPATIENT)
Dept: RADIOLOGY | Facility: MEDICAL CENTER | Age: 75
DRG: 189 | End: 2025-03-04
Payer: COMMERCIAL

## 2025-03-04 ENCOUNTER — APPOINTMENT (OUTPATIENT)
Dept: RADIOLOGY | Facility: MEDICAL CENTER | Age: 75
DRG: 189 | End: 2025-03-04
Attending: STUDENT IN AN ORGANIZED HEALTH CARE EDUCATION/TRAINING PROGRAM
Payer: COMMERCIAL

## 2025-03-04 PROBLEM — M54.50 ACUTE MIDLINE LOW BACK PAIN: Status: ACTIVE | Noted: 2025-03-04

## 2025-03-04 PROBLEM — R50.9 FEVER OF UNKNOWN ORIGIN: Status: ACTIVE | Noted: 2025-03-04

## 2025-03-04 PROBLEM — D72.828 NEUTROPHILIC LEUKOCYTOSIS: Status: ACTIVE | Noted: 2025-03-04

## 2025-03-04 PROBLEM — E78.5 HYPERLIPIDEMIA: Status: ACTIVE | Noted: 2025-03-04

## 2025-03-04 PROBLEM — N10 ACUTE PYELONEPHRITIS: Status: RESOLVED | Noted: 2025-03-03 | Resolved: 2025-03-04

## 2025-03-04 LAB
ANION GAP SERPL CALC-SCNC: 13 MMOL/L (ref 7–16)
B PARAP IS1001 DNA NPH QL NAA+NON-PROBE: NOT DETECTED
B PERT.PT PRMT NPH QL NAA+NON-PROBE: NOT DETECTED
BUN SERPL-MCNC: 15 MG/DL (ref 8–22)
C PNEUM DNA NPH QL NAA+NON-PROBE: NOT DETECTED
CALCIUM SERPL-MCNC: 9.2 MG/DL (ref 8.5–10.5)
CHLORIDE SERPL-SCNC: 105 MMOL/L (ref 96–112)
CO2 SERPL-SCNC: 21 MMOL/L (ref 20–33)
CREAT SERPL-MCNC: 0.86 MG/DL (ref 0.5–1.4)
CRP SERPL HS-MCNC: 4.89 MG/DL (ref 0–0.75)
EKG IMPRESSION: NORMAL
ERYTHROCYTE [DISTWIDTH] IN BLOOD BY AUTOMATED COUNT: 41.8 FL (ref 35.9–50)
ERYTHROCYTE [SEDIMENTATION RATE] IN BLOOD BY WESTERGREN METHOD: 22 MM/HOUR (ref 0–25)
FLUAV RNA NPH QL NAA+NON-PROBE: NOT DETECTED
FLUBV RNA NPH QL NAA+NON-PROBE: NOT DETECTED
GFR SERPLBLD CREATININE-BSD FMLA CKD-EPI: 71 ML/MIN/1.73 M 2
GLUCOSE BLD STRIP.AUTO-MCNC: 120 MG/DL (ref 65–99)
GLUCOSE BLD STRIP.AUTO-MCNC: 125 MG/DL (ref 65–99)
GLUCOSE BLD STRIP.AUTO-MCNC: 135 MG/DL (ref 65–99)
GLUCOSE BLD STRIP.AUTO-MCNC: 213 MG/DL (ref 65–99)
GLUCOSE SERPL-MCNC: 158 MG/DL (ref 65–99)
HADV DNA NPH QL NAA+NON-PROBE: NOT DETECTED
HCOV 229E RNA NPH QL NAA+NON-PROBE: NOT DETECTED
HCOV HKU1 RNA NPH QL NAA+NON-PROBE: NOT DETECTED
HCOV NL63 RNA NPH QL NAA+NON-PROBE: NOT DETECTED
HCOV OC43 RNA NPH QL NAA+NON-PROBE: NOT DETECTED
HCT VFR BLD AUTO: 42.2 % (ref 37–47)
HGB BLD-MCNC: 13.7 G/DL (ref 12–16)
HMPV RNA NPH QL NAA+NON-PROBE: NOT DETECTED
HPIV1 RNA NPH QL NAA+NON-PROBE: NOT DETECTED
HPIV2 RNA NPH QL NAA+NON-PROBE: NOT DETECTED
HPIV3 RNA NPH QL NAA+NON-PROBE: NOT DETECTED
HPIV4 RNA NPH QL NAA+NON-PROBE: NOT DETECTED
INR PPP: 1.03 (ref 0.87–1.13)
LACTATE SERPL-SCNC: 1.6 MMOL/L (ref 0.5–2)
M PNEUMO DNA NPH QL NAA+NON-PROBE: NOT DETECTED
MCH RBC QN AUTO: 29.4 PG (ref 27–33)
MCHC RBC AUTO-ENTMCNC: 32.5 G/DL (ref 32.2–35.5)
MCV RBC AUTO: 90.6 FL (ref 81.4–97.8)
PLATELET # BLD AUTO: 174 K/UL (ref 164–446)
PMV BLD AUTO: 9.9 FL (ref 9–12.9)
POTASSIUM SERPL-SCNC: 3.3 MMOL/L (ref 3.6–5.5)
PROCALCITONIN SERPL-MCNC: 0.14 NG/ML
PROTHROMBIN TIME: 13.5 SEC (ref 12–14.6)
RBC # BLD AUTO: 4.66 M/UL (ref 4.2–5.4)
RSV RNA NPH QL NAA+NON-PROBE: NOT DETECTED
RV+EV RNA NPH QL NAA+NON-PROBE: NOT DETECTED
SARS-COV-2 RNA NPH QL NAA+NON-PROBE: NOTDETECTED
SCCMEC + MECA PNL NOSE NAA+PROBE: NEGATIVE
SODIUM SERPL-SCNC: 139 MMOL/L (ref 135–145)
WBC # BLD AUTO: 19.7 K/UL (ref 4.8–10.8)

## 2025-03-04 PROCEDURE — 85027 COMPLETE CBC AUTOMATED: CPT

## 2025-03-04 PROCEDURE — 72158 MRI LUMBAR SPINE W/O & W/DYE: CPT

## 2025-03-04 PROCEDURE — 82962 GLUCOSE BLOOD TEST: CPT

## 2025-03-04 PROCEDURE — 700102 HCHG RX REV CODE 250 W/ 637 OVERRIDE(OP)

## 2025-03-04 PROCEDURE — 71045 X-RAY EXAM CHEST 1 VIEW: CPT

## 2025-03-04 PROCEDURE — 87641 MR-STAPH DNA AMP PROBE: CPT

## 2025-03-04 PROCEDURE — 700117 HCHG RX CONTRAST REV CODE 255: Mod: JZ | Performed by: STUDENT IN AN ORGANIZED HEALTH CARE EDUCATION/TRAINING PROGRAM

## 2025-03-04 PROCEDURE — 86140 C-REACTIVE PROTEIN: CPT

## 2025-03-04 PROCEDURE — A9579 GAD-BASE MR CONTRAST NOS,1ML: HCPCS | Mod: JZ | Performed by: STUDENT IN AN ORGANIZED HEALTH CARE EDUCATION/TRAINING PROGRAM

## 2025-03-04 PROCEDURE — A9270 NON-COVERED ITEM OR SERVICE: HCPCS

## 2025-03-04 PROCEDURE — 700111 HCHG RX REV CODE 636 W/ 250 OVERRIDE (IP): Mod: JZ

## 2025-03-04 PROCEDURE — 85610 PROTHROMBIN TIME: CPT

## 2025-03-04 PROCEDURE — 700105 HCHG RX REV CODE 258

## 2025-03-04 PROCEDURE — 83516 IMMUNOASSAY NONANTIBODY: CPT

## 2025-03-04 PROCEDURE — 80048 BASIC METABOLIC PNL TOTAL CA: CPT

## 2025-03-04 PROCEDURE — 99232 SBSQ HOSP IP/OBS MODERATE 35: CPT | Performed by: INTERNAL MEDICINE

## 2025-03-04 PROCEDURE — 85652 RBC SED RATE AUTOMATED: CPT

## 2025-03-04 PROCEDURE — 93005 ELECTROCARDIOGRAM TRACING: CPT | Mod: TC | Performed by: STUDENT IN AN ORGANIZED HEALTH CARE EDUCATION/TRAINING PROGRAM

## 2025-03-04 PROCEDURE — 74176 CT ABD & PELVIS W/O CONTRAST: CPT

## 2025-03-04 PROCEDURE — 36415 COLL VENOUS BLD VENIPUNCTURE: CPT

## 2025-03-04 PROCEDURE — 86255 FLUORESCENT ANTIBODY SCREEN: CPT

## 2025-03-04 PROCEDURE — 770001 HCHG ROOM/CARE - MED/SURG/GYN PRIV*

## 2025-03-04 PROCEDURE — 86480 TB TEST CELL IMMUN MEASURE: CPT

## 2025-03-04 RX ORDER — LOSARTAN POTASSIUM 50 MG/1
50 TABLET ORAL DAILY
Status: DISCONTINUED | OUTPATIENT
Start: 2025-03-04 | End: 2025-03-06 | Stop reason: HOSPADM

## 2025-03-04 RX ORDER — METOPROLOL SUCCINATE 25 MG/1
25 TABLET, EXTENDED RELEASE ORAL DAILY
Status: DISCONTINUED | OUTPATIENT
Start: 2025-03-04 | End: 2025-03-06 | Stop reason: HOSPADM

## 2025-03-04 RX ORDER — ROSUVASTATIN CALCIUM 5 MG/1
20 TABLET, COATED ORAL EVERY EVENING
Status: DISCONTINUED | OUTPATIENT
Start: 2025-03-04 | End: 2025-03-06 | Stop reason: HOSPADM

## 2025-03-04 RX ORDER — AMOXICILLIN 250 MG
2 CAPSULE ORAL EVERY EVENING
Status: DISCONTINUED | OUTPATIENT
Start: 2025-03-04 | End: 2025-03-06 | Stop reason: HOSPADM

## 2025-03-04 RX ORDER — ACETAMINOPHEN 325 MG/1
650 TABLET ORAL EVERY 6 HOURS PRN
Status: DISCONTINUED | OUTPATIENT
Start: 2025-03-04 | End: 2025-03-06 | Stop reason: HOSPADM

## 2025-03-04 RX ORDER — DOXYCYCLINE 100 MG/1
100 TABLET ORAL EVERY 12 HOURS
Status: DISCONTINUED | OUTPATIENT
Start: 2025-03-04 | End: 2025-03-04

## 2025-03-04 RX ORDER — DORZOLAMIDE HYDROCHLORIDE AND TIMOLOL MALEATE 20; 5 MG/ML; MG/ML
1 SOLUTION/ DROPS OPHTHALMIC 2 TIMES DAILY
Status: DISCONTINUED | OUTPATIENT
Start: 2025-03-04 | End: 2025-03-06 | Stop reason: HOSPADM

## 2025-03-04 RX ORDER — ALLOPURINOL 300 MG/1
300 TABLET ORAL DAILY
Status: DISCONTINUED | OUTPATIENT
Start: 2025-03-04 | End: 2025-03-06 | Stop reason: HOSPADM

## 2025-03-04 RX ORDER — GUAIFENESIN 600 MG/1
600 TABLET, EXTENDED RELEASE ORAL EVERY 12 HOURS PRN
Status: DISCONTINUED | OUTPATIENT
Start: 2025-03-04 | End: 2025-03-06 | Stop reason: HOSPADM

## 2025-03-04 RX ORDER — LIDOCAINE 4 G/G
1 PATCH TOPICAL EVERY 24 HOURS
Status: DISCONTINUED | OUTPATIENT
Start: 2025-03-04 | End: 2025-03-06 | Stop reason: HOSPADM

## 2025-03-04 RX ORDER — DEXTROSE MONOHYDRATE 25 G/50ML
25 INJECTION, SOLUTION INTRAVENOUS
Status: DISCONTINUED | OUTPATIENT
Start: 2025-03-04 | End: 2025-03-06 | Stop reason: HOSPADM

## 2025-03-04 RX ORDER — POTASSIUM CHLORIDE 1500 MG/1
40 TABLET, EXTENDED RELEASE ORAL ONCE
Status: COMPLETED | OUTPATIENT
Start: 2025-03-04 | End: 2025-03-04

## 2025-03-04 RX ORDER — POLYETHYLENE GLYCOL 3350 17 G/17G
1 POWDER, FOR SOLUTION ORAL
Status: DISCONTINUED | OUTPATIENT
Start: 2025-03-04 | End: 2025-03-06 | Stop reason: HOSPADM

## 2025-03-04 RX ORDER — LATANOPROST 50 UG/ML
1 SOLUTION/ DROPS OPHTHALMIC NIGHTLY
Status: DISCONTINUED | OUTPATIENT
Start: 2025-03-04 | End: 2025-03-06 | Stop reason: HOSPADM

## 2025-03-04 RX ORDER — ENOXAPARIN SODIUM 100 MG/ML
40 INJECTION SUBCUTANEOUS DAILY
Status: DISCONTINUED | OUTPATIENT
Start: 2025-03-04 | End: 2025-03-06 | Stop reason: HOSPADM

## 2025-03-04 RX ORDER — INSULIN LISPRO 100 [IU]/ML
2-9 INJECTION, SOLUTION INTRAVENOUS; SUBCUTANEOUS
Status: DISCONTINUED | OUTPATIENT
Start: 2025-03-04 | End: 2025-03-06 | Stop reason: HOSPADM

## 2025-03-04 RX ADMIN — DOXYCYCLINE 100 MG: 100 TABLET, FILM COATED ORAL at 03:06

## 2025-03-04 RX ADMIN — DORZOLAMIDE HYDROCHLORIDE AND TIMOLOL MALEATE 1 DROP: 20; 5 SOLUTION/ DROPS OPHTHALMIC at 17:39

## 2025-03-04 RX ADMIN — INSULIN LISPRO 3 UNITS: 100 INJECTION, SOLUTION INTRAVENOUS; SUBCUTANEOUS at 12:52

## 2025-03-04 RX ADMIN — LATANOPROST 1 DROP: 50 SOLUTION OPHTHALMIC at 22:26

## 2025-03-04 RX ADMIN — ACETAMINOPHEN 650 MG: 325 TABLET ORAL at 01:47

## 2025-03-04 RX ADMIN — DORZOLAMIDE HYDROCHLORIDE AND TIMOLOL MALEATE 1 DROP: 20; 5 SOLUTION/ DROPS OPHTHALMIC at 06:02

## 2025-03-04 RX ADMIN — GADOTERIDOL 15 ML: 279.3 INJECTION, SOLUTION INTRAVENOUS at 09:40

## 2025-03-04 RX ADMIN — GUAIFENESIN 600 MG: 600 TABLET, EXTENDED RELEASE ORAL at 10:06

## 2025-03-04 RX ADMIN — ALLOPURINOL 300 MG: 300 TABLET ORAL at 06:02

## 2025-03-04 RX ADMIN — ROSUVASTATIN CALCIUM 20 MG: 20 TABLET, FILM COATED ORAL at 17:37

## 2025-03-04 RX ADMIN — ENOXAPARIN SODIUM 40 MG: 100 INJECTION SUBCUTANEOUS at 17:47

## 2025-03-04 RX ADMIN — POTASSIUM CHLORIDE 40 MEQ: 1500 TABLET, EXTENDED RELEASE ORAL at 10:06

## 2025-03-04 RX ADMIN — AMPICILLIN AND SULBACTAM 3 G: 1; 2 INJECTION, POWDER, FOR SOLUTION INTRAMUSCULAR; INTRAVENOUS at 03:09

## 2025-03-04 RX ADMIN — AMPICILLIN AND SULBACTAM 3 G: 1; 2 INJECTION, POWDER, FOR SOLUTION INTRAMUSCULAR; INTRAVENOUS at 08:08

## 2025-03-04 RX ADMIN — SENNOSIDES AND DOCUSATE SODIUM 2 TABLET: 50; 8.6 TABLET ORAL at 17:37

## 2025-03-04 RX ADMIN — METOPROLOL SUCCINATE 25 MG: 25 TABLET, EXTENDED RELEASE ORAL at 06:02

## 2025-03-04 RX ADMIN — ACETAMINOPHEN 650 MG: 325 TABLET ORAL at 11:29

## 2025-03-04 RX ADMIN — LOSARTAN POTASSIUM 50 MG: 50 TABLET, FILM COATED ORAL at 06:02

## 2025-03-04 ASSESSMENT — SOCIAL DETERMINANTS OF HEALTH (SDOH)
WITHIN THE PAST 12 MONTHS, THE FOOD YOU BOUGHT JUST DIDN'T LAST AND YOU DIDN'T HAVE MONEY TO GET MORE: NEVER TRUE
IN THE PAST 12 MONTHS, HAS THE ELECTRIC, GAS, OIL, OR WATER COMPANY THREATENED TO SHUT OFF SERVICE IN YOUR HOME?: NO
WITHIN THE LAST YEAR, HAVE YOU BEEN AFRAID OF YOUR PARTNER OR EX-PARTNER?: NO
WITHIN THE LAST YEAR, HAVE TO BEEN RAPED OR FORCED TO HAVE ANY KIND OF SEXUAL ACTIVITY BY YOUR PARTNER OR EX-PARTNER?: NO
WITHIN THE PAST 12 MONTHS, YOU WORRIED THAT YOUR FOOD WOULD RUN OUT BEFORE YOU GOT THE MONEY TO BUY MORE: NEVER TRUE
WITHIN THE LAST YEAR, HAVE YOU BEEN HUMILIATED OR EMOTIONALLY ABUSED IN OTHER WAYS BY YOUR PARTNER OR EX-PARTNER?: NO
WITHIN THE LAST YEAR, HAVE YOU BEEN KICKED, HIT, SLAPPED, OR OTHERWISE PHYSICALLY HURT BY YOUR PARTNER OR EX-PARTNER?: NO

## 2025-03-04 ASSESSMENT — PATIENT HEALTH QUESTIONNAIRE - PHQ9
1. LITTLE INTEREST OR PLEASURE IN DOING THINGS: NOT AT ALL
SUM OF ALL RESPONSES TO PHQ9 QUESTIONS 1 AND 2: 0
2. FEELING DOWN, DEPRESSED, IRRITABLE, OR HOPELESS: NOT AT ALL

## 2025-03-04 ASSESSMENT — ENCOUNTER SYMPTOMS
EYES NEGATIVE: 1
BACK PAIN: 1
CONSTIPATION: 0
HEMOPTYSIS: 0
CHILLS: 1
FEVER: 1
CONSTITUTIONAL NEGATIVE: 1
GASTROINTESTINAL NEGATIVE: 1
COUGH: 1
VOMITING: 0
ABDOMINAL PAIN: 0
NEUROLOGICAL NEGATIVE: 1
SPUTUM PRODUCTION: 1
DIARRHEA: 0
HEADACHES: 0
SPUTUM PRODUCTION: 0
FLANK PAIN: 0
DIZZINESS: 1
SHORTNESS OF BREATH: 1
WEAKNESS: 0
NAUSEA: 0
PSYCHIATRIC NEGATIVE: 1

## 2025-03-04 ASSESSMENT — COGNITIVE AND FUNCTIONAL STATUS - GENERAL
SUGGESTED CMS G CODE MODIFIER MOBILITY: CH
DAILY ACTIVITIY SCORE: 24
SUGGESTED CMS G CODE MODIFIER DAILY ACTIVITY: CH
MOBILITY SCORE: 24

## 2025-03-04 ASSESSMENT — LIFESTYLE VARIABLES
EVER FELT BAD OR GUILTY ABOUT YOUR DRINKING: NO
HOW MANY TIMES IN THE PAST YEAR HAVE YOU HAD 5 OR MORE DRINKS IN A DAY: 0
TOTAL SCORE: 0
HAVE YOU EVER FELT YOU SHOULD CUT DOWN ON YOUR DRINKING: NO
ON A TYPICAL DAY WHEN YOU DRINK ALCOHOL HOW MANY DRINKS DO YOU HAVE: 1
HAVE PEOPLE ANNOYED YOU BY CRITICIZING YOUR DRINKING: NO
EVER HAD A DRINK FIRST THING IN THE MORNING TO STEADY YOUR NERVES TO GET RID OF A HANGOVER: NO
AVERAGE NUMBER OF DAYS PER WEEK YOU HAVE A DRINK CONTAINING ALCOHOL: 1
DOES PATIENT WANT TO STOP DRINKING: NO
ALCOHOL_USE: YES
TOTAL SCORE: 0
TOTAL SCORE: 0
CONSUMPTION TOTAL: NEGATIVE

## 2025-03-04 ASSESSMENT — COPD QUESTIONNAIRES
DURING THE PAST 4 WEEKS HOW MUCH DID YOU FEEL SHORT OF BREATH: NONE/LITTLE OF THE TIME
HAVE YOU SMOKED AT LEAST 100 CIGARETTES IN YOUR ENTIRE LIFE: NO/DON'T KNOW
DO YOU EVER COUGH UP ANY MUCUS OR PHLEGM?: YES, A FEW DAYS A WEEK OR MONTH
COPD SCREENING SCORE: 4

## 2025-03-04 ASSESSMENT — FIBROSIS 4 INDEX: FIB4 SCORE: 2.35

## 2025-03-04 ASSESSMENT — PAIN DESCRIPTION - PAIN TYPE: TYPE: ACUTE PAIN

## 2025-03-04 NOTE — ED TRIAGE NOTES
Chief Complaint   Patient presents with    Back Pain     Low back pain with fever 103 at 1730, took APAP 500mg at 1800.     Vitals:    03/03/25 2001   BP: (!) 141/94   Pulse: (!) 116   Resp: 14   Temp: 37.2 °C (99 °F)   SpO2: 93%     Patient ambulatory to triage for above complaint. Patient A&Ox4, GCS 15, patient speaking in full sentences. Equal and unlabored respirations. Patient educated on triage process and encouraged to notify staff if condition worsens. Appropriate protocols ordered. Patient returned to the lobby in stable condition.

## 2025-03-04 NOTE — ED NOTES
Patient c/o of new chest pain, EKG ordered per protocol and admitting resident notified. Patient Hypoxic when lying flat (86%), Place on 2L NC. Resident OK with patient still going to T.

## 2025-03-04 NOTE — PROGRESS NOTES
Patient arrives to floor by transport with sister and brother in law.  Patient able to ambulate to bed with assistance.  Patient scores low fall risk but placed on bed alarm.  Bed in low position.  Call light within reach.

## 2025-03-04 NOTE — ASSESSMENT & PLAN NOTE
Pt with neutrophilic leukocytosis on admission. No CVA tenderness and does not look like pyelonephritis clinically. UA with elevated WBC and small leukocyte esterase but not enough to consider this as an infectious etiology. Pt was treated with Nitrofurantoin for 7 days for hematuria that has since resolved. Blood culture negative. Lactic acid and procalcitonin wnl. Extended respiratory panel negative. CXR showed mild diffuse interstitial prominence. CT renal colic without any calculus or collecting system dilatation.   At this time tuberculosis is also a consideration given her symptoms and that [place] higher rates of [place].  S/p 1 dose of Cefazolin in the ED.  - Telemetry  - Airborne isolation  - Continue Acetaminophen q6hrs as needed  - Workup for a pulmonary-renal syndrome: ANCA panel and anti-glomerular basement membrane antibodies, C3 and C4 complement, GABRIELA, anti-dsDNA, Hepatitis panel, serum free light chains, serum immunofixation  - Quantiferon and sputum culture pending to rule out TB. CTAP and CT chest ordered to further evaluate

## 2025-03-04 NOTE — ED NOTES
Bedside report received from off going RN/tech: Tereza, assumed care of patient.  POC discussed with patient. Call light within reach, all needs addressed at this time.       Fall risk interventions in place: Move the patient closer to the nurse's station, Patient's personal possessions are with in their safe reach, and Place socks on patient (all applicable per [facility] Fall risk assessment)   Continuous monitoring: Cardiac Leads, Pulse Ox, or Blood Pressure  IVF/IV medications: Not Applicable   Oxygen: Room Air  Bedside sitter: Not Applicable   Isolation: Not Applicable

## 2025-03-04 NOTE — CARE PLAN
The patient is Stable - Low risk of patient condition declining or worsening    Shift Goals  Clinical Goals: pain control. abx, safety  Patient Goals: pain control, home  Family Goals: updates    Progress made toward(s) clinical / shift goals:    Problem: Pain - Standard  Goal: Alleviation of pain or a reduction in pain to the patient’s comfort goal  Description: Target End Date:  Prior to discharge or change in level of care    Document on Vitals flowsheet    1.  Document pain using the appropriate pain scale per order or unit policy  2.  Educate and implement non-pharmacologic comfort measures (i.e. relaxation, distraction, massage, cold/heat therapy, etc.)  3.  Pain management medications as ordered  4.  Reassess pain after pain med administration per policy  5.  If opiods administered assess patient's response to pain medication is appropriate per POSS sedation scale  6.  Follow pain management plan developed in collaboration with patient and interdisciplinary team (including palliative care or pain specialists if applicable)  Outcome: Progressing     Problem: Knowledge Deficit - Standard  Goal: Patient and family/care givers will demonstrate understanding of plan of care, disease process/condition, diagnostic tests and medications  Description: Target End Date:  1-3 days or as soon as patient condition allows    Document in Patient Education    1.  Patient and family/caregiver oriented to unit, equipment, visitation policy and means for communicating concern  2.  Complete/review Learning Assessment  3.  Assess knowledge level of disease process/condition, treatment plan, diagnostic tests and medications  4.  Explain disease process/condition, treatment plan, diagnostic tests and medications  Outcome: Progressing     Problem: Hemodynamics  Goal: Patient's hemodynamics, fluid balance and neurologic status will be stable or improve  Description: Target End Date:  Prior to discharge or change in level of  care    Document on Assessment and I/O flowsheet templates    1.  Monitor vital signs, pulse oximetry and cardiac monitor per provider order and/or policy  2.  Maintain blood pressure per provider order  3.  Hemodynamic monitoring per provider order  4.  Manage IV fluids and IV infusions  5.  Monitor intake and output  6.  Daily weights per unit policy or provider order  7.  Assess peripheral pulses and capillary refill  8.  Assess color and body temperature  9.  Position patient for maximum circulation/cardiac output  10. Monitor for signs/symptoms of excessive bleeding  11. Assess mental status, restlessness and changes in level of consciousness  12. Monitor temperature and report fever or hypothermia to provider immediately. Consideration of targeted temperature management.  Outcome: Progressing     Problem: Fluid Volume  Goal: Fluid volume balance will be maintained  Description: Target End Date:  Prior to discharge or change in level of care    Document on I/O flowsheet    1.  Monitor intake and output as ordered  2.  Promote oral intake as appropriate  3.  Report inadequate intake or output to physician  4.  Administer IV therapy as ordered  5.  Weights per provider order  6.  Assess for signs and symptoms of bleeding  7.  Monitor for signs of fluid overload (respiratory changes, edema, weight gain, increased abdominal girth)  8.  Monitor of signs for inadequate fluid volume (poor skin turgor, dry mucous membranes)  9.  Instruct patient on adherence to fluid restrictions  Outcome: Progressing     Problem: Urinary - Renal Perfusion  Goal: Ability to achieve and maintain adequate renal perfusion and functioning will improve  Description: Target End Date:  Prior to discharge or change in level of care    Document on I/O and Assessment flowsheet    1.  Urine output will remain greater than 0.5ml/Kg/HR  2.  Monitor amount and/or characteristics of urine per order/policy. Specific gravity per order/policy  3.   Assess signs and symptoms of renal dysfunction  Outcome: Progressing     Problem: Mechanical Ventilation  Goal: Safe management of artificial airway and ventilation  Description: Target End Date:  when vent discontinued    Document on VAP flowsheet and Airway LDA    1.  Daily awakening trials per provider order/policy  2.  Suctioning and care of ET/Trach tube (document on LDA)  3.  Collaborate with RT to administer medications/treatments  4.  Ambu bag at bedside and available for transport  5.  Trach patient - replacement trach at bedside  6.  Provide communication tools if applicable  Outcome: Progressing  Goal: Successful weaning off mechanical ventilator, spontaneously maintains adequate gas exchange  Description: Target End Date:  when vent discontinued    1.  Follow universal weaning protocol for patients on mechanical ventilation per order  2.  Review contraindication list.  Obtain provider order to wean in presence of contraindication.  3.  Obtain Pavel Sedation-Agitation Score  4.  Pavel Score 1-2:  sedation vacation  5.  Pavel Score 3-4:  Collaborate with provider and/or RT to determine readiness for trial  6.  Begin 2 hour trial of weaning following protocol  7.  Evaluate for fatigue parameters per protocol  8.  Fatigue parameters triggered:  Stop wean and return to previous ASV% setting or increase % minute volume to offset work or breathing  Outcome: Progressing  Goal: Patient will be able to express needs and understand communication  Description: Target End Date:  when vent discontinued    1.  Assess ability to communicate and understand  2.  Provide communication tools  3.  Collaborate with Speech Therapy for [facility]  Outcome: Progressing     Problem: Physical Regulation  Goal: Diagnostic test results will improve  Description: Target End Date:  Prior to discharge or change in level of care    1.  Monitor lactic acid levels  2.  Monitor ABG's  3.  Monitor diagnostic test results  Outcome:  Progressing  Goal: Signs and symptoms of infection will decrease  Description: Target End Date:  Prior to discharge or change in level of care    1.  Remove potential routes of infection, such as central lines and urinary catheter  2.  Follow facility protocol for changing IV tubing and sites  3.  Collaborate with Infectious Disease  4.  Antibiotic therapy per provider order  5.  Note drug effects and monitor for antibiotic toxicity  Outcome: Progressing       Patient is not progressing towards the following goals:

## 2025-03-04 NOTE — PROGRESS NOTES
Telemetry Report:        Per Telestrip from Monitor Room     Rhythm: SR-ST [id number]    Ectopy:    MN: .12  QRS:.12  Qt: .32

## 2025-03-04 NOTE — PROGRESS NOTES
Received report from Tereza.  When reviewing chart, there is an outstanding respiratory panel. Covid and flu negative.  Reached out to Tereza to question the order and she recommended I reach out to provider.  Asked her to cancel transport until clarified since patient is going into a semi-private room.  Reached out to Dr. Alfredo and they are doing an extended resp panel.  Sent volte message to ED charge Chidi Alvarado to let him know we need to wait for results since patient is going to semi-private room.

## 2025-03-04 NOTE — CARE PLAN
The patient is Stable - Low risk of patient condition declining or worsening    Shift Goals  Clinical Goals: Abx, safety  Patient Goals: rest    Progress made toward(s) clinical / shift goals:    Problem: Knowledge Deficit - Standard  Goal: Patient and family/care givers will demonstrate understanding of plan of care, disease process/condition, diagnostic tests and medications  Description: Target End Date:  1-3 days or as soon as patient condition allows    Document in Patient Education    1.  Patient and family/caregiver oriented to unit, equipment, visitation policy and means for communicating concern  2.  Complete/review Learning Assessment  3.  Assess knowledge level of disease process/condition, treatment plan, diagnostic tests and medications  4.  Explain disease process/condition, treatment plan, diagnostic tests and medications  Outcome: Progressing  Note: Reviewed poc with patient and family.  All questions answered and family verbalizes understanding.      Problem: Respiratory  Goal: Patient will achieve/maintain optimum respiratory ventilation and gas exchange  Description: Target End Date:  Prior to discharge or change in level of care    Document on Assessment flowsheet    1.  Assess and monitor rate, rhythm, depth and effort of respiration  2.  Breath sounds assessed qshift and/or as needed  3.  Assess O2 saturation, administer/titrate oxygen as ordered  4.  Position patient for maximum ventilatory efficiency  5.  Turn, cough, and deep breath with splinting to improve effectiveness  6.  Collaborate with RT to administer medication/treatments per order  7.  Encourage use of incentive spirometer and encourage patient to cough after use and utilize splinting techniques if applicable  8.  Airway suctioning  9.  Monitor sputum production for changes in color, consistency and frequency  10. Perform frequent oral hygiene  11. Alternate physical activity with rest periods  Outcome:  Progressing  Flowsheets  Taken 3/4/2025 0300 by Terrell Claire R.N.  Deep Breathe and Cough: Performs Correctly  Taken 3/4/2025 0215 by Moni Álvarez  O2 Delivery Device: Nasal Cannula  Note: Patient remains on O2 for low sats.

## 2025-03-04 NOTE — PROGRESS NOTES
4 Eyes Skin Assessment Completed by KM Tejada and KM Baxter.    Head WDL  Ears WDL  Nose WDL  Mouth WDL  Neck WDL  Breast/Chest WDL  Shoulder Blades WDL  Spine Redness and Blanching  (R) Arm/Elbow/Hand WDL  (L) Arm/Elbow/Hand WDL  Abdomen WDL  Groin WDL  Scrotum/Coccyx/Buttocks WDL  (R) Leg WDL  (L) Leg WDL  (R) Heel/Foot/Toe WDL  (L) Heel/Foot/Toe WDL          Devices In Places Tele Box, Blood Pressure Cuff, Pulse Ox, and Nasal Cannula      Interventions In Place Gray Ear Foams, NC W/Ear Foams, Pillows, and Low Air Loss Mattress    Possible Skin Injury No    Pictures Uploaded Into Epic N/A  Wound Consult Placed N/A  RN Wound Prevention Protocol Ordered No

## 2025-03-04 NOTE — ED NOTES
Med rec updated  and complete. Allergies reviewed.    Interviewed  family at bedside.  Family provided a list of medications and confirmed last doses taken.    No outpatient antibiotic use in last 30 days.    No anticoagulant medications    Preferred Pharmacy  [facility] = [phone]

## 2025-03-04 NOTE — ED NOTES
Floor RN Terrell provided with update regarding patient status. Patient out of room to CT with transport now.

## 2025-03-04 NOTE — H&P
[facility] History & Physical Note    Date of Service  3/4/2025     Attending: Davion Phan M.d.  Senior Resident: Dr. Alfredo  Intern:  Dr. Leyva  Contact Number: [phone]    Primary Care Physician  Zan Valerio D.N.P.    Consultants       Specialist Names:     Code Status  Full Code    Chief Complaint  Chief Complaint   Patient presents with    Back Pain     Low back pain with fever 103 at 1730, took APAP 500mg at 1800.       History of Presenting Illness (HPI):   Sue Can is a 74 y.o. female with past medical history of Hypertension, Hyperlipidemia and Gout who presented 3/3/2025 with fever, lower back pain and cough which started in the morning today. Patient states that she developed fever (T: 103) at around 730 in the morning and took Tylenol but fever didn't go down and she started having lower back pain. She also reported cough productive for minimal sputum, difficulty breathing and dizziness. She denies abdominal Pain, Flank Pain, dysuria, Urinary urgency and hesitancy but acknowledges blood in her urine for which she recently completed a 7 day course of Nitrofurantoin.    ED COURSE:  Vital SIGNS: T: 99, HR: 106 bpm, BP: 144/66, SpO2: 91 % on RA  Blood Work-up: CBC shows Neutrophilic Leukocytosis with WBC of 17.6 and Neutrophils of 87, Chem Panel shows normal RFTs with Cr: 0.93 and BUN: 21, Lactic Acid wnl at 1.7, UA shows 11-20 WBC and small leukocyte esterase.  Imaging: Chest X-ray shows  interstitial infiltrates of the right middle lobe and blunting of the left costophrenic angle  Patient received Cefazolin 2g and 1L of NS bolus in the ED    I discussed the plan of care with patient and family.    Review of Systems  Review of Systems   Constitutional:  Positive for chills, fever and malaise/fatigue.   Respiratory:  Positive for cough, sputum production and shortness of breath.    Cardiovascular:  Negative for chest pain.   Gastrointestinal:  Negative for abdominal pain,  constipation, diarrhea, nausea and vomiting.   Genitourinary:  Negative for dysuria, flank pain, frequency, hematuria and urgency.   Musculoskeletal:  Positive for back pain.   Neurological:  Positive for dizziness. Negative for headaches.       Past Medical History   has a past medical history of GOUT, Hyperlipidemia, Hypertension, and Microscopic hematuria.    Surgical History   has a past surgical history that includes appendectomy; abdominal hysterectomy total; and colonoscopy with polyp (11/10).     Family History  family history includes Arrythmia in her mother; Diabetes in her mother; Hyperlipidemia in her brother, brother, brother, brother, father, mother, sister, sister, and sister; Hypertension in her brother, brother, brother, brother, father, mother, sister, sister, and sister; Stroke in her mother.   Family history reviewed with patient.     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  Other (stressors, spirituality, exposures):     Allergies  Allergies   Allergen Reactions    Lisinopril Cough     cough       Medications  Prior to Admission Medications   Prescriptions Last Dose Informant Patient Reported? Taking?   Non Formulary Request   Yes No   Sig: Dorsoamide/Timolol 22.3 mg/6.8mg   allopurinol (ZYLOPRIM) 300 MG Tab 3/3/2025 Morning  No Yes   Sig: Take 1 tablet by mouth once daily   latanoprost (XALATAN) 0.005 % Solution   Yes No   Sig: INSTILL 1 DROP INTO EACH EYE AT BEDTIME   losartan (COZAAR) 50 MG Tab   No No   Sig: Take 1 tablet by mouth once daily   metFORMIN (GLUCOPHAGE) 500 MG Tab   No No   Sig: TAKE 1 TABLET BY MOUTH TWICE DAILY WITH MEALS   metoprolol SR (TOPROL XL) 25 MG TABLET SR 24 HR   No No   Sig: Take 1 Tablet by mouth every day.   rosuvastatin (CRESTOR) 20 MG Tab    "No No   Sig: Take 1 Tablet by mouth every evening.      Facility-Administered Medications: None       Physical Exam  Temp:  [37.2 °C (99 °F)] 37.2 °C (99 °F)  Pulse:  [102-116] 111  Resp:  [14-20] 18  BP: (139-161)/(66-94) 139/68  SpO2:  [91 %-94 %] 92 %  Blood Pressure : (!) 144/66   Temperature: 37.2 °C (99 °F)   Pulse: (!) 106   Respiration: 16   Pulse Oximetry: 91 %       Physical Exam  HENT:      Mouth/Throat:      Mouth: Mucous membranes are moist.   Eyes:      Pupils: Pupils are equal, round, and reactive to light.   Cardiovascular:      Rate and Rhythm: Tachycardia present.      Heart sounds: No murmur heard.  Pulmonary:      Breath sounds: Rales present.   Abdominal:      General: There is no distension.      Tenderness: There is no abdominal tenderness.   Musculoskeletal:         General: No swelling.      Right lower leg: No edema.      Left lower leg: No edema.   Neurological:      General: No focal deficit present.         Laboratory:  Recent Labs     03/03/25 2025   WBC 17.6*   RBC 5.04   HEMOGLOBIN 14.9   HEMATOCRIT 45.3   MCV 89.9   MCH 29.6   MCHC 32.9   RDW 42.4   PLATELETCT 212   MPV 9.9     Recent Labs     03/03/25 2025   SODIUM 138   POTASSIUM 3.9   CHLORIDE 105   CO2 18*   GLUCOSE 227*   BUN 21   CREATININE 0.93   CALCIUM 10.3     Recent Labs     03/03/25 2025   ALTSGPT 20   ASTSGOT 31   ALKPHOSPHAT 66   TBILIRUBIN 0.7   LIPASE 34   GLUCOSE 227*         No results for input(s): \"NTPROBNP\" in the last 72 hours.      No results for input(s): \"TROPONINT\" in the last 72 hours.    Imaging:  DX-CHEST-PORTABLE (1 VIEW)   Final Result      Mild diffuse interstitial prominence which could be seen in the setting edema and/or infection.      CT-ABDOMEN-PELVIS W/O    (Results Pending)       X-Ray:  I have personally reviewed the images and compared with prior images.  EKG:  I have personally reviewed the images and compared with prior images.    Assessment/Plan:  Problem Representation:   A 74 year old " female presented to the hospital for fever/chills, Lower back pain and cough in the setting of neutrophilic Leukocytosis. Patient has been given Cefazolin in the ED for Pyelonephritis and has been admitted to the hospital for further management. In the absence of any urinary sx, flank pain and costophrenic angle tenderness; our suspicion for pyelonephritis is very low. She reports hematuria therefore further evaluation with CT scan Abd/pelvis for kidney stones guaranteed.        * Neutrophilic leukocytosis  Assessment & Plan  Patient admitted with Neutrophilic Leukocytosis with WBC at 17 and 80% Neutrophilis. On Exam, No CVA tenderness was noticed and UA shows 11-20 WBC and small LE but nothing too significant to consider it infectious. She denies any urinary symptoms and reports only Hematuria for which she got treated with Nitrofurantoin for 7 days. She also has a new onset cough, difficulty breathing and some interstitial opacities of the right side on chest X-ray. Respiratory Panel, Pocalcitonin and Blood Cultures have been sent . She received 1 dose of Cefazolin in the ED.  -Admit to the telemetry floor  -Follow up on Blood culture and respiratory panel  -Continue Acetaminophen q6hrs as needed  -Consider starting patient on empiric antibiotic with Unasyn and Azithromycin if Pro calcitonin is elevated or if patient deteriorates clinically; Suspicion for bacterial pneumonia is low right now and she already received 1 dose of Cefazolin in the ED      Acute midline low back pain  Assessment & Plan  Patient presented with mildine Lower back pain which started today. She describes it as severe pain which exacerbates when she stands up. She also reports Hematuria so this back pain could be in the setting of Nephrolithiasis   -CT Scan Abd/Pelvis in the am for nephrolithiasis  -Acetaminophen Q 6 hrs as needed  -Lidocaine patches     DM (diabetes mellitus) (HCC)- (present on admission)  Assessment & Plan  Patient takes  Metformin at home.  -Hold home Metformin  -Start Patient on ISS  -Hypoglycemia protocol    Hypertension- (present on admission)  Assessment & Plan  -Continue Losartan 50 mg OD and Metoprolol 25 mg OD    Gout- (present on admission)  Assessment & Plan  -Continue Allopurinol    Hyperlipidemia  Assessment & Plan  -Continue Rosuvastatin 40 mg daily        VTE prophylaxis: SCDs/TEDs and enoxaparin ppx

## 2025-03-04 NOTE — ED PROVIDER NOTES
CHIEF COMPLAINT  Chief Complaint   Patient presents with    Back Pain     Low back pain with fever 103 at 1730, took APAP 500mg at 1800.       LIMITATION TO HISTORY   Select: Language barrier  used    HPI    Sue Can is a 74 y.o. female who presents to the Emergency Department for evaluation of fevers diffuse myalgias low back pain which started today.  Patient developed a fever at about 730 did take aspirin prior to arrival no nausea no vomiting no diarrhea no abdominal discomfort dysuria increased urinary frequency urgency.    OUTSIDE HISTORIAN(S):  Select: Family    EXTERNAL RECORDS REVIEWED  Select: Other office visit 2/13/2025, patient has a history of hypertension as well as diabetes      PAST MEDICAL HISTORY  Past Medical History:   Diagnosis Date    GOUT     Hyperlipidemia     Hypertension     Microscopic hematuria     negative workup including CT and cytology, cystoscopy.- mild trigonitis     .    SURGICAL HISTORY  Past Surgical History:   Procedure Laterality Date    COLONOSCOPY WITH POLYP  11/10    benign polyp - repeat in 2020    ABDOMINAL HYSTERECTOMY TOTAL      fibroid tumor    APPENDECTOMY           FAMILY HISTORY  Family History   Problem Relation Age of Onset    Stroke Mother     Arrythmia Mother         A.Fib    Diabetes Mother         borderline    Hypertension Mother     Hyperlipidemia Mother     Hypertension Father     Hyperlipidemia Father     Hyperlipidemia Sister     Hypertension Sister     Hypertension Brother     Hyperlipidemia Brother     Hyperlipidemia Sister     Hypertension Sister     Hyperlipidemia Sister     Hypertension Sister     Hyperlipidemia Brother     Hypertension Brother     Hyperlipidemia Brother     Hypertension Brother     Hyperlipidemia Brother     Hypertension Brother           SOCIAL HISTORY  Social History     Socioeconomic History    Marital status: Single     Spouse name: Not on file    Number of children: 0    Years of education: Not on file     Highest education level: Some college, no degree   Occupational History    Occupation: [employer]     Employer: retired   Tobacco Use    Smoking status: Never    Smokeless tobacco: Never   Vaping Use    Vaping status: Never Used   Substance and Sexual Activity    Alcohol use: Yes     Alcohol/week: 0.0 oz     Comment: occasional    Drug use: No    Sexual activity: Never     Partners: Male   Other Topics Concern    Not on file   Social History Narrative    Not on file     Social Drivers of Health     Financial Resource Strain: Low Risk  (10/20/2022)    Overall Financial Resource Strain (CARDIA)     Difficulty of Paying Living Expenses: Not very hard   Food Insecurity: No Food Insecurity (10/20/2022)    Hunger Vital Sign     Worried About Running Out of Food in the Last Year: Never true     Ran Out of Food in the Last Year: Never true   Transportation Needs: No Transportation Needs (10/20/2022)    PRAPARE - Transportation     Lack of Transportation (Medical): No     Lack of Transportation (Non-Medical): No   Physical Activity: Insufficiently Active (10/20/2022)    Exercise Vital Sign     Days of Exercise per Week: 2 days     Minutes of Exercise per Session: 10 min   Stress: Stress Concern Present (10/20/2022)    [employer] - Occupational Stress Questionnaire     Feeling of Stress : To some extent   Social Connections: Moderately Isolated (10/20/2022)    Social Connection and Isolation Panel [NHANES]     Frequency of Communication with Friends and Family: More than three times a week     Frequency of Social Gatherings with Friends and Family: Once a week     Attends Yazdanism Services: More than 4 times per year     Active Member of Clubs or Organizations: No     Attends Club or Organization Meetings: Never     Marital Status: Never    Intimate Partner Violence: Not on file   Housing Stability: Low Risk  (10/20/2022)    Housing Stability Vital Sign     Unable to Pay for  Housing in the Last Year: No     Number of Places Lived in the Last Year: 1     Unstable Housing in the Last Year: No         CURRENT MEDICATIONS  No current facility-administered medications on file prior to encounter.     Current Outpatient Medications on File Prior to Encounter   Medication Sig Dispense Refill    nitrofurantoin (MACROBID) 100 MG Cap Take 1 Capsule by mouth 2 times a day. 10 Capsule 0    allopurinol (ZYLOPRIM) 300 MG Tab Take 1 tablet by mouth once daily 90 Tablet 0    losartan (COZAAR) 50 MG Tab Take 1 tablet by mouth once daily 90 Tablet 0    metFORMIN (GLUCOPHAGE) 500 MG Tab TAKE 1 TABLET BY MOUTH TWICE DAILY WITH MEALS 180 Tablet 0    metoprolol SR (TOPROL XL) 25 MG TABLET SR 24 HR Take 1 Tablet by mouth every day. 90 Tablet 1    Non Formulary Request Dorsoamide/Timolol 22.3 mg/6.8mg      rosuvastatin (CRESTOR) 20 MG Tab Take 1 Tablet by mouth every evening. 30 Tablet 11    meloxicam (MOBIC) 7.5 MG Tab Take 1 Tablet by mouth every day. 90 Tablet 3    benzonatate (TESSALON) 100 MG Cap Take 1 Capsule by mouth 3 times a day as needed for Cough. 60 Capsule 0    CALCIUM PO Take  by mouth.      Cholecalciferol (VITAMIN D-3 PO) Take  by mouth.      Ascorbic Acid (VITAMIN C PO) Take  by mouth.      latanoprost (XALATAN) 0.005 % Solution INSTILL 1 DROP INTO EACH EYE AT BEDTIME             ALLERGIES  Allergies   Allergen Reactions    Lisinopril Cough     cough       PHYSICAL EXAM  VITAL SIGNS:BP (!) 144/66   Pulse (!) 106   Temp 37.2 °C (99 °F) (Temporal)   Resp 16   Ht 1.524 m (5')   Wt 66.5 kg (146 lb 9.7 oz)   LMP  (LMP Unknown)   SpO2 91%   BMI 28.63 kg/m²       VITALS - vital signs documented prior to this note have been reviewed and noted,  see EHR  GENERAL - awake and alert, no acute distress  HEENT - normocephalic, atraumatic, moist mucus membranes  CARDIOVASCULAR - regular rate and rhythm  PULMONARY - unlabored, no respiratory distress. No audible wheezing or  stridor.  Back: No CVA  tenderness no rashes contusions or abrasions  NEUROLOGIC - mental status normal, speech fluid, cognition normal  MUSCULOSKELETAL -no obvious deformity or swelling  DERMATOLOGIC - warm and dry, no visible rashes  PSYCHIATRIC - normal affect, normal concentration      DIAGNOSTIC STUDIES / PROCEDURES    LABS  Labs Reviewed   CBC WITH DIFFERENTIAL - Abnormal; Notable for the following components:       Result Value    WBC 17.6 (*)     Neutrophils-Polys 87.40 (*)     Lymphocytes 7.10 (*)     Neutrophils (Absolute) 15.35 (*)     All other components within normal limits   COMP METABOLIC PANEL - Abnormal; Notable for the following components:    Co2 18 (*)     Glucose 227 (*)     Globulin 3.8 (*)     All other components within normal limits   URINALYSIS - Abnormal; Notable for the following components:    Character Cloudy (*)     Protein 100 (*)     Leukocyte Esterase Large (*)     Occult Blood Moderate (*)     All other components within normal limits   URINE MICROSCOPIC (W/UA) - Abnormal; Notable for the following components:    WBC 21-50 (*)     RBC 6-10 (*)     Bacteria Few (*)     Epithelial Cells 6-10 (*)     Epithelial Cells Renal Present (*)     Urine Casts 3-5 (*)     All other components within normal limits   URINALYSIS,CULTURE IF INDICATED - Abnormal; Notable for the following components:    Protein 100 (*)     Leukocyte Esterase Small (*)     Occult Blood Moderate (*)     All other components within normal limits   URINE MICROSCOPIC (W/UA) - Abnormal; Notable for the following components:    WBC 11-20 (*)     RBC 3-5 (*)     Epithelial Cells 6-10 (*)     All other components within normal limits   LIPASE   LACTIC ACID   ESTIMATED GFR   LACTIC ACID   LACTIC ACID   BLOOD CULTURE   BLOOD CULTURE   POCT COV-2, FLU A/B, RSV BY PCR   POC COV-2, FLU A/B, RSV BY PCR   Patient has a leukocytosis, urinalysis does have an 11-20 WBCs may be urinary tract infection will cover with Ancef      RADIOLOGY  I have  independently interpreted the diagnostic imaging associated with this visit and am waiting the final reading from the radiologist.   My preliminary interpretation is as follows: Chest x-ray shows no focal infiltrate      Radiologist interpretation:   DX-CHEST-PORTABLE (1 VIEW)   Final Result      Mild diffuse interstitial prominence which could be seen in the setting edema and/or infection.           COURSE & MEDICAL DECISION MAKING    ED COURSE:    ED Observation Status? no    INTERVENTIONS BY ME:  Medications   NS (Bolus) 0.9 % infusion 1,000 mL (1,000 mL Intravenous New Bag 3/3/25 2149)   ceFAZolin (Ancef) injection 2 g (2 g Intravenous Given 3/3/25 2214)           Possible source of infection identified at 2115 Ancef ordered      HYDRATION: Based on the patient's presentation of Dehydration the patient was given IV fluids. IV Hydration was used because oral hydration was not adequate alone. Upon recheck following hydration, the patient was mildly improved.    INITIAL ASSESSMENT, COURSE AND PLAN  Care Narrative: Patient presented for evaluation of fevers myalgias and bilateral flank pain which is started today.  Upon arrival in the emergency department the patient was noted to be tachycardic there was afebrile.  Initial workup was remarkable for a leukocytosis of 17.6, did order a septic workup.  Her initial urinalysis was heavily contaminated though there were signs of a possible UTI that she was treated empirically with Ancef and did order repeat urinalysis which again was contaminated though does have 11-20 WBCs.  Given the patient's fevers, bilateral flank pain and leukocytosis I believe she likely has an underlying urinary tract infection given the flank pain possible early pyelonephritis.  Patient will be admitted to medicine for further care and evaluation.             ADDITIONAL PROBLEM LIST    DISPOSITION AND DISCUSSIONS  I have discussed management of the patient with the following physicians and MARLO's:  Hospitalist    Decision tools and prescription drugs considered including, but not limited to: Antibiotics for UTI .    FINAL DIAGNOSIS  1. Pyelonephritis             Electronically signed by: Henry Marmolejo DO ,11:06 PM 03/03/25

## 2025-03-04 NOTE — HOSPITAL COURSE
Pt is a 74/M with a PMH of Hypertension, Hyperlipidemia and Gout who presented 3/3/2025 with a 1-day history of fever, lower back pain and cough. Her fever reached 103F for which she took Tylenol without relief. She also complained of lower back pain and productive cough. She denies chest pains, shortness of breath, palpitations, dysuria. She states she recently finished a 7-day course of Nitrofurantoin.    ED COURSE:  Vital SIGNS: T: 99, HR: 106 bpm, BP: 144/66, SpO2: 91 % on RA  Blood Work-up: CBC shows Neutrophilic Leukocytosis with WBC of 17.6 and Neutrophils of 87, Chem Panel shows normal RFTs with Cr: 0.93 and BUN: 21, Lactic Acid wnl at 1.7, UA shows 11-20 WBC and small leukocyte esterase.  Imaging: Chest X-ray shows  interstitial infiltrates of the right middle lobe and blunting of the left costophrenic angle  Patient received Cefazolin 2g and 1L of NS bolus in the ED

## 2025-03-04 NOTE — ED NOTES
Verified with admitting hospitalist and resident that repeat lactic and blood cultures do not need to be run

## 2025-03-04 NOTE — PROGRESS NOTES
[facility] Internal Medicine Daily Progress Note    Date of Service  3/4/2025    [facility] Team: [facility]   Attending: Ace Jones M.d.  Senior Resident: Dr. Richard Regalado  Intern:  Dr. Bob Mota  Contact Number: [phone]    Chief Complaint  Sue Can is a 74 y.o. female admitted 3/3/2025 with fever, cough, lower back pain.    Hospital Course  Pt is a 74/M with a PMH of Hypertension, Hyperlipidemia and Gout who presented 3/3/2025 with a 1-day history of fever, lower back pain and cough. Her fever reached 103F for which she took Tylenol without relief. She also complained of lower back pain and productive cough. She denies chest pains, shortness of breath, palpitations, dysuria. She states she recently finished a 7-day course of Nitrofurantoin.    ED COURSE:  Vital SIGNS: T: 99, HR: 106 bpm, BP: 144/66, SpO2: 91 % on RA  Blood Work-up: CBC shows Neutrophilic Leukocytosis with WBC of 17.6 and Neutrophils of 87, Chem Panel shows normal RFTs with Cr: 0.93 and BUN: 21, Lactic Acid wnl at 1.7, UA shows 11-20 WBC and small leukocyte esterase.  Imaging: Chest X-ray shows  interstitial infiltrates of the right middle lobe and blunting of the left costophrenic angle  Patient received Cefazolin 2g and 1L of NS bolus in the ED    Interval Problem Update  Pt complains of persistent lower back pain and cough. She denies abdominal pain, flank pain, problems walking, changes in bowel or bladder habits. She is afebrile now but slightly tachypneic. CXR showed infiltrate and WBC was elevated; otherwise, she has an unremarkable physical exam.  We will work her up for an autoimmune cause for all her symptoms by requesting an ANCA panel, anti-basement membrane, CRP, and ESR.    I have discussed this patient's plan of care and discharge plan at IDT rounds today with Case Management, Nursing, Nursing leadership, and other members of the IDT team.    Consultants/Specialty  N/A    Code Status  Full  Code    Disposition  The patient is not medically cleared for discharge to home or a post-acute facility.      I have placed the appropriate orders for post-discharge needs.    Review of Systems  Review of Systems   Constitutional: Negative.    HENT: Negative.     Eyes: Negative.    Respiratory:  Positive for cough and shortness of breath. Negative for hemoptysis and sputum production.    Gastrointestinal: Negative.    Genitourinary: Negative.    Musculoskeletal:  Positive for back pain.   Skin: Negative.    Neurological: Negative.  Negative for weakness.   Endo/Heme/Allergies: Negative.    Psychiatric/Behavioral: Negative.          Physical Exam  Temp:  [36.7 °C (98.1 °F)-38.7 °C (101.6 °F)] 37.9 °C (100.2 °F)  Pulse:  [] 80  Resp:  [14-28] 24  BP: (122-161)/(61-94) 122/61  SpO2:  [91 %-97 %] 95 %    Physical Exam  HENT:      Head: Normocephalic.   Cardiovascular:      Rate and Rhythm: Normal rate and regular rhythm.   Pulmonary:      Effort: Pulmonary effort is normal.      Breath sounds: Normal breath sounds.   Abdominal:      General: Abdomen is flat.      Palpations: Abdomen is soft.      Tenderness: There is no abdominal tenderness.   Musculoskeletal:         General: Normal range of motion.      Cervical back: Normal range of motion.   Skin:     General: Skin is warm.   Neurological:      General: No focal deficit present.      Mental Status: She is alert.      Cranial Nerves: No cranial nerve deficit.      Sensory: No sensory deficit.      Motor: No weakness.   Psychiatric:         Mood and Affect: Mood normal.         Fluids    Intake/Output Summary (Last 24 hours) at 3/4/2025 1517  Last data filed at 3/4/2025 1300  Gross per 24 hour   Intake 337.69 ml   Output 350 ml   Net -12.31 ml       Laboratory  Recent Labs     03/03/25 2025 03/04/25  0306   WBC 17.6* 19.7*   RBC 5.04 4.66   HEMOGLOBIN 14.9 13.7   HEMATOCRIT 45.3 42.2   MCV 89.9 90.6   MCH 29.6 29.4   MCHC 32.9 32.5   RDW 42.4 41.8   PLATELETCT  212 174   MPV 9.9 9.9     Recent Labs     03/03/25 2025 03/04/25  0306   SODIUM 138 139   POTASSIUM 3.9 3.3*   CHLORIDE 105 105   CO2 18* 21   GLUCOSE 227* 158*   BUN 21 15   CREATININE 0.93 0.86   CALCIUM 10.3 9.2     Recent Labs     03/04/25  0306   INR 1.03               Imaging  DX-CHEST-PORTABLE (1 VIEW)   Final Result      Stable chest with minimal interstitial prominence, no change from yesterday's exam.      MR-LUMBAR SPINE-WITH & W/O   Final Result      Degenerative changes in the lumbar spine as described above.      CT-RENAL COLIC EVALUATION(A/P W/O)   Final Result      1. No urinary tract calculus identified. No renal collecting system dilatation.      2. No evidence of inflammatory change in the abdomen or pelvis. The study is limited due to nonuse of intravenous contrast.      3. Diverticulosis without discrete evidence of diverticulitis.      4. Atherosclerosis with coronary artery disease.      DX-CHEST-PORTABLE (1 VIEW)   Final Result      Mild diffuse interstitial prominence which could be seen in the setting edema and/or infection.           Assessment/Plan  Problem Representation:    * Fever of unknown origin- (present on admission)  Assessment & Plan  Pt with neutrophilic leukocytosis on admission. No CVA tenderness and does not look like pyelonephritis clinically. UA with elevated WBC and small leukocyte esterase but not enough to consider this as an infectious etiology. Pt was treated with Nitrofurantoin for 7 days for hematuria that has since resolved. Blood culture negative. Lactic acid and procalcitonin wnl. Extended respiratory panel negative. CXR showed mild diffuse interstitial prominence. CT renal colic without any calculus or collecting system dilatation.  S/p 1 dose of Cefazolin in the ED.  - Telemetry  - Continue Acetaminophen q6hrs as needed  - Will work up an autoimmune cause with ANCA panel, anti-basement membrane, CRP, ESR      Acute midline low back pain- (present on  admission)  Assessment & Plan  - MRI for further evaluation  - Acetaminophen Q 6 hrs as needed  - Lidocaine patches     Hyperlipidemia- (present on admission)  Assessment & Plan  - Continue Rosuvastatin 40 mg daily    Hypertension- (present on admission)  Assessment & Plan  - Continue Losartan 50 mg OD and Metoprolol 25 mg OD    DM (diabetes mellitus) (HCC)- (present on admission)  Assessment & Plan  - Hold home Metformin  - Sliding scale insulin  - Hypoglycemia protocol  - DM diet    Gout- (present on admission)  Assessment & Plan  - Continue Allopurinol         VTE prophylaxis: enoxaparin ppx    I have performed a physical exam and reviewed and updated ROS and Plan today (3/4/2025). In review of yesterday's note (3/3/2025), there are no changes except as documented above.

## 2025-03-04 NOTE — ED NOTES
Pt ambulated to room with family, placed on cardiac and o2 monitor. Fall precautions in place and call light in reach

## 2025-03-05 ENCOUNTER — APPOINTMENT (OUTPATIENT)
Dept: RADIOLOGY | Facility: MEDICAL CENTER | Age: 75
DRG: 189 | End: 2025-03-05
Payer: COMMERCIAL

## 2025-03-05 PROBLEM — J96.01 ACUTE HYPOXIC RESPIRATORY FAILURE (HCC): Status: ACTIVE | Noted: 2025-03-04

## 2025-03-05 LAB
ANION GAP SERPL CALC-SCNC: 11 MMOL/L (ref 7–16)
BUN SERPL-MCNC: 12 MG/DL (ref 8–22)
CALCIUM SERPL-MCNC: 9 MG/DL (ref 8.5–10.5)
CHLORIDE SERPL-SCNC: 104 MMOL/L (ref 96–112)
CO2 SERPL-SCNC: 22 MMOL/L (ref 20–33)
CREAT SERPL-MCNC: 0.79 MG/DL (ref 0.5–1.4)
ERYTHROCYTE [DISTWIDTH] IN BLOOD BY AUTOMATED COUNT: 44.4 FL (ref 35.9–50)
GAMMA INTERFERON BACKGROUND BLD IA-ACNC: 0.62 IU/ML
GFR SERPLBLD CREATININE-BSD FMLA CKD-EPI: 79 ML/MIN/1.73 M 2
GLUCOSE BLD STRIP.AUTO-MCNC: 101 MG/DL (ref 65–99)
GLUCOSE BLD STRIP.AUTO-MCNC: 146 MG/DL (ref 65–99)
GLUCOSE BLD STRIP.AUTO-MCNC: 151 MG/DL (ref 65–99)
GLUCOSE BLD STRIP.AUTO-MCNC: 83 MG/DL (ref 65–99)
GLUCOSE SERPL-MCNC: 123 MG/DL (ref 65–99)
HCT VFR BLD AUTO: 41.2 % (ref 37–47)
HGB BLD-MCNC: 13.1 G/DL (ref 12–16)
M TB IFN-G BLD-IMP: NEGATIVE
M TB IFN-G CD4+ BCKGRND COR BLD-ACNC: 0.05 IU/ML
MCH RBC QN AUTO: 29.4 PG (ref 27–33)
MCHC RBC AUTO-ENTMCNC: 31.8 G/DL (ref 32.2–35.5)
MCV RBC AUTO: 92.6 FL (ref 81.4–97.8)
MITOGEN IGNF BCKGRD COR BLD-ACNC: 3.53 IU/ML
PLATELET # BLD AUTO: 160 K/UL (ref 164–446)
PMV BLD AUTO: 10.2 FL (ref 9–12.9)
POTASSIUM SERPL-SCNC: 4 MMOL/L (ref 3.6–5.5)
PROCALCITONIN SERPL-MCNC: 0.29 NG/ML
QFT TB2 - NIL TBQ2: -0.02 IU/ML
RBC # BLD AUTO: 4.45 M/UL (ref 4.2–5.4)
SODIUM SERPL-SCNC: 137 MMOL/L (ref 135–145)
WBC # BLD AUTO: 10.4 K/UL (ref 4.8–10.8)

## 2025-03-05 PROCEDURE — A9270 NON-COVERED ITEM OR SERVICE: HCPCS

## 2025-03-05 PROCEDURE — 700117 HCHG RX CONTRAST REV CODE 255

## 2025-03-05 PROCEDURE — 700111 HCHG RX REV CODE 636 W/ 250 OVERRIDE (IP): Mod: JZ

## 2025-03-05 PROCEDURE — 85027 COMPLETE CBC AUTOMATED: CPT

## 2025-03-05 PROCEDURE — 770001 HCHG ROOM/CARE - MED/SURG/GYN PRIV*

## 2025-03-05 PROCEDURE — 99232 SBSQ HOSP IP/OBS MODERATE 35: CPT | Performed by: INTERNAL MEDICINE

## 2025-03-05 PROCEDURE — 80048 BASIC METABOLIC PNL TOTAL CA: CPT

## 2025-03-05 PROCEDURE — 700101 HCHG RX REV CODE 250

## 2025-03-05 PROCEDURE — 97162 PT EVAL MOD COMPLEX 30 MIN: CPT

## 2025-03-05 PROCEDURE — 82962 GLUCOSE BLOOD TEST: CPT | Mod: 91

## 2025-03-05 PROCEDURE — 71260 CT THORAX DX C+: CPT

## 2025-03-05 PROCEDURE — 84145 PROCALCITONIN (PCT): CPT

## 2025-03-05 PROCEDURE — 700102 HCHG RX REV CODE 250 W/ 637 OVERRIDE(OP)

## 2025-03-05 PROCEDURE — 97535 SELF CARE MNGMENT TRAINING: CPT

## 2025-03-05 RX ADMIN — LOSARTAN POTASSIUM 50 MG: 50 TABLET, FILM COATED ORAL at 06:06

## 2025-03-05 RX ADMIN — ENOXAPARIN SODIUM 40 MG: 100 INJECTION SUBCUTANEOUS at 16:12

## 2025-03-05 RX ADMIN — DORZOLAMIDE HYDROCHLORIDE AND TIMOLOL MALEATE 1 DROP: 20; 5 SOLUTION/ DROPS OPHTHALMIC at 16:13

## 2025-03-05 RX ADMIN — DORZOLAMIDE HYDROCHLORIDE AND TIMOLOL MALEATE 1 DROP: 20; 5 SOLUTION/ DROPS OPHTHALMIC at 06:07

## 2025-03-05 RX ADMIN — IOHEXOL 90 ML: 350 INJECTION, SOLUTION INTRAVENOUS at 21:20

## 2025-03-05 RX ADMIN — METOPROLOL SUCCINATE 25 MG: 25 TABLET, EXTENDED RELEASE ORAL at 06:05

## 2025-03-05 RX ADMIN — LIDOCAINE PAIN RELIEF 1 PATCH: 560 PATCH TOPICAL at 06:06

## 2025-03-05 RX ADMIN — ROSUVASTATIN CALCIUM 20 MG: 20 TABLET, FILM COATED ORAL at 16:13

## 2025-03-05 RX ADMIN — ALLOPURINOL 300 MG: 300 TABLET ORAL at 06:05

## 2025-03-05 ASSESSMENT — ENCOUNTER SYMPTOMS
GASTROINTESTINAL NEGATIVE: 1
PSYCHIATRIC NEGATIVE: 1
BACK PAIN: 1
SHORTNESS OF BREATH: 1
CONSTITUTIONAL NEGATIVE: 1
EYES NEGATIVE: 1
NEUROLOGICAL NEGATIVE: 1
SPUTUM PRODUCTION: 0
HEMOPTYSIS: 0
COUGH: 1
WEAKNESS: 0

## 2025-03-05 ASSESSMENT — GAIT ASSESSMENTS
DISTANCE (FEET): 40
DEVIATION: NO DEVIATION
GAIT LEVEL OF ASSIST: SUPERVISED

## 2025-03-05 ASSESSMENT — PAIN DESCRIPTION - PAIN TYPE
TYPE: ACUTE PAIN
TYPE: ACUTE PAIN

## 2025-03-05 ASSESSMENT — COGNITIVE AND FUNCTIONAL STATUS - GENERAL
CLIMB 3 TO 5 STEPS WITH RAILING: A LITTLE
WALKING IN HOSPITAL ROOM: A LITTLE
MOBILITY SCORE: 22
SUGGESTED CMS G CODE MODIFIER MOBILITY: CJ

## 2025-03-05 NOTE — PROGRESS NOTES
[facility] Internal Medicine Daily Progress Note    Date of Service  3/5/2025    [facility] Team: R JULIETTE Carroll Team   Attending: Ace Jones M.d.  Senior Resident: Dr. Richard Regalado  Intern:  Dr. Bob Mota  Contact Number: [phone]    Chief Complaint  Sue Can is a 74 y.o. female admitted 3/3/2025 with fever, cough, lower back pain.    Hospital Course  Pt is a 74/M with a PMH of Hypertension, Hyperlipidemia and Gout who presented 3/3/2025 with a 1-day history of fever, lower back pain and cough. Her fever reached 103F for which she took Tylenol without relief. She also complained of lower back pain and productive cough. She denies chest pains, shortness of breath, palpitations, dysuria. She states she recently finished a 7-day course of Nitrofurantoin.    ED COURSE:  Vital SIGNS: T: 99, HR: 106 bpm, BP: 144/66, SpO2: 91 % on RA  Blood Work-up: CBC shows Neutrophilic Leukocytosis with WBC of 17.6 and Neutrophils of 87, Chem Panel shows normal RFTs with Cr: 0.93 and BUN: 21, Lactic Acid wnl at 1.7, UA shows 11-20 WBC and small leukocyte esterase.  Imaging: Chest X-ray shows  interstitial infiltrates of the right middle lobe and blunting of the left costophrenic angle  Patient received Cefazolin 2g and 1L of NS bolus in the ED    Interval Problem Update  Pt still complains of lower back pain and cough with shortness of breath. She is currently on 1L nc.  Procal slightly increased at 0.29, CRP elevated at 4.89 with ESR wnl.  MRI lumbar showed degenerative changes in L spine.  CXR showed stable minimal interstitial prominence.    - Quantiferon and sputum culture pending to rule out TB. CTAP and CT chest ordered to further evaluate  - Workup for a pulmonary-renal syndrome: ANCA panel and anti-glomerular basement membrane antibodies, C3 and C4 complement, GABRIELA, anti-dsDNA, Hepatitis panel, serum free light chains, serum immunofixation    I have discussed this patient's plan of care and discharge plan at IDT  rounds today with Case Management, Nursing, Nursing leadership, and other members of the IDT team.    Consultants/Specialty  N/A    Code Status  Full Code    Disposition  The patient is not medically cleared for discharge to home or a post-acute facility.      I have placed the appropriate orders for post-discharge needs.    Review of Systems  Review of Systems   Constitutional: Negative.    HENT: Negative.     Eyes: Negative.    Respiratory:  Positive for cough and shortness of breath. Negative for hemoptysis and sputum production.    Gastrointestinal: Negative.    Genitourinary: Negative.    Musculoskeletal:  Positive for back pain.   Skin: Negative.    Neurological: Negative.  Negative for weakness.   Endo/Heme/Allergies: Negative.    Psychiatric/Behavioral: Negative.          Physical Exam  Temp:  [36.5 °C (97.7 °F)-36.6 °C (97.8 °F)] 36.5 °C (97.7 °F)  Pulse:  [72-80] 72  Resp:  [18] 18  BP: (115-120)/(59-66) 115/59  SpO2:  [95 %-98 %] 95 %    Physical Exam  HENT:      Head: Normocephalic.   Cardiovascular:      Rate and Rhythm: Normal rate and regular rhythm.   Pulmonary:      Effort: Pulmonary effort is normal. No respiratory distress.      Breath sounds: Normal breath sounds.   Abdominal:      General: Abdomen is flat.      Palpations: Abdomen is soft.      Tenderness: There is no abdominal tenderness.   Musculoskeletal:         General: Normal range of motion.      Cervical back: Normal range of motion.   Skin:     General: Skin is warm.   Neurological:      General: No focal deficit present.      Mental Status: She is alert.      Cranial Nerves: No cranial nerve deficit.      Sensory: No sensory deficit.      Motor: No weakness.   Psychiatric:         Mood and Affect: Mood normal.         Fluids    Intake/Output Summary (Last 24 hours) at 3/5/2025 1627  Last data filed at 3/5/2025 1200  Gross per 24 hour   Intake 750 ml   Output 350 ml   Net 400 ml       Laboratory  Recent Labs     03/03/25 2025  03/04/25  0306 03/05/25  0115   WBC 17.6* 19.7* 10.4   RBC 5.04 4.66 4.45   HEMOGLOBIN 14.9 13.7 13.1   HEMATOCRIT 45.3 42.2 41.2   MCV 89.9 90.6 92.6   MCH 29.6 29.4 29.4   MCHC 32.9 32.5 31.8*   RDW 42.4 41.8 44.4   PLATELETCT 212 174 160*   MPV 9.9 9.9 10.2     Recent Labs     03/03/25 2025 03/04/25  0306 03/05/25  0115   SODIUM 138 139 137   POTASSIUM 3.9 3.3* 4.0   CHLORIDE 105 105 104   CO2 18* 21 22   GLUCOSE 227* 158* 123*   BUN 21 15 12   CREATININE 0.93 0.86 0.79   CALCIUM 10.3 9.2 9.0     Recent Labs     03/04/25  0306   INR 1.03               Imaging  DX-CHEST-PORTABLE (1 VIEW)   Final Result      Stable chest with minimal interstitial prominence, no change from yesterday's exam.      MR-LUMBAR SPINE-WITH & W/O   Final Result      Degenerative changes in the lumbar spine as described above.      CT-RENAL COLIC EVALUATION(A/P W/O)   Final Result      1. No urinary tract calculus identified. No renal collecting system dilatation.      2. No evidence of inflammatory change in the abdomen or pelvis. The study is limited due to nonuse of intravenous contrast.      3. Diverticulosis without discrete evidence of diverticulitis.      4. Atherosclerosis with coronary artery disease.      DX-CHEST-PORTABLE (1 VIEW)   Final Result      Mild diffuse interstitial prominence which could be seen in the setting edema and/or infection.      CT-CHEST,ABDOMEN,PELVIS WITH    (Results Pending)        Assessment/Plan  Problem Representation:    * Acute hypoxic respiratory failure (HCC)- (present on admission)  Assessment & Plan  Pt with neutrophilic leukocytosis on admission. No CVA tenderness and does not look like pyelonephritis clinically. UA with elevated WBC and small leukocyte esterase but not enough to consider this as an infectious etiology. Pt was treated with Nitrofurantoin for 7 days for hematuria that has since resolved. Blood culture negative. Lactic acid and procalcitonin wnl. Extended respiratory panel  negative. CXR showed mild diffuse interstitial prominence. CT renal colic without any calculus or collecting system dilatation.   At this time tuberculosis is also a consideration given her symptoms and [place] higher rates [place].  S/p 1 dose of Cefazolin in the ED.  - Telemetry  - Airborne isolation  - Continue Acetaminophen q6hrs as needed  - Workup for a pulmonary-renal syndrome: ANCA panel and anti-glomerular basement membrane antibodies, C3 and C4 complement, GABRIELA, anti-dsDNA, Hepatitis panel, serum free light chains, serum immunofixation  - Quantiferon and sputum culture pending to rule out TB. CTAP and CT chest ordered to further evaluate      Acute midline low back pain- (present on admission)  Assessment & Plan  - MRI for further evaluation  - Acetaminophen Q 6 hrs as needed  - Lidocaine patches     Hyperlipidemia- (present on admission)  Assessment & Plan  - Continue Rosuvastatin 40 mg daily    Hypertension- (present on admission)  Assessment & Plan  - Continue Losartan 50 mg OD and Metoprolol 25 mg OD    DM (diabetes mellitus) (HCC)- (present on admission)  Assessment & Plan  - Hold home Metformin  - Sliding scale insulin  - Hypoglycemia protocol  - DM diet    Gout- (present on admission)  Assessment & Plan  - Continue Allopurinol         VTE prophylaxis: enoxaparin ppx    I have performed a physical exam and reviewed and updated ROS and Plan today (3/5/2025). In review of yesterday's note (3/4/2025), there are no changes except as documented above.

## 2025-03-05 NOTE — THERAPY
Physical Therapy   Initial Evaluation     Patient Name: Sue Can  Age:  74 y.o., Sex:  female  Medical Record #: [id number]  Today's Date: 3/5/2025          Assessment  Patient is 74 y.o. female past medical history of Hypertension, Hyperlipidemia and Gout who presented 3/3/2025 with fever, lower back pain and cough, difficulty breathing and dizziness. MRI: Degenerative changes in the lumbar spine as described above. Dx;d with Fever of unknown origin.    Pt with chronic knee pain that increases in the cold weather and pt uses knee braces prn, also with chronic back pain that increases with sit to stand. No radicular pain or N/T. Pt supervised/independent with mobility. No further acute PT needs. Recommend pt follows up with outpatient PT.    Plan    Physical Therapy Initial Treatment Plan   Duration: Evaluation only    DC Equipment Recommendations: None  Discharge Recommendations: Recommend outpatient physical therapy services to address higher level deficits (sister stated that pt is going to PCP soon and will ask for outpatient PT consult)       Subjective    Pt agreed to PT. Supportive sister in room and has been walking pt in room. Sister was a nurse.      Objective       03/05/25 1515   Time In/Time Out   Therapy Start Time 1448   Therapy End Time 1515   Total Therapy Time 27   Charge Group   PT Evaluation PT Evaluation Mod   PT Self Care / Home Evaluation (Units) 1   Total Time Spent   PT Evaluation Time Spent (Mins) 15   PT Self Care/Home Evaluation Time Spent (Mins) 12   PT Total Time Spent (Calculated) 27    Services   Is patient using  services for this encounter? No   Language Interpreted Tagalog    Name sister    Mode Other  (sister)   Content of Interpretation (select all) History/Visit information;Patient Education;Other  (PT)   Initial Contact Note    Initial Contact Note Order Received and Verified, Evaluation Only - Patient Does Not Require  Further Acute Physical Therapy at this Time.  However, May Benefit from Post Acute Therapy for Higher Level Functional Deficits.   Vitals   Room Air Oximetry 93   Vitals Comments no s/s distress   Pain 0 - 10 Group   Location Back;Knee   Location Orientation Right;Left;Lower  (chronic lower back, chronic bilat knees)   Therapist Pain Assessment Post Activity Pain Same as Prior to Activity  (provided ptwith warm packs for below her lidocaine patch)   Prior Living Situation   Prior Services Home-Independent   Housing / Facility 2 Story House   Steps Into Home 1   Steps In Home 10   Rail Left Rail (Steps in Home)   Bathroom Set up Tub Transfer Bench;Bathtub / Shower Combination   Lives with - Patient's Self Care Capacity Related Adult   Prior Level of Functional Mobility   Comments independent, works at [employer]   History of Falls   History of Falls No   Cognition    Cognition / Consciousness WDL   Level of Consciousness Alert   Comments pleasant, cooperative, per sister pt has a learning disabililty   Active ROM Lower Body    Active ROM Lower Body  WDL   Strength Lower Body   Lower Body Strength  WDL   Coordination Lower Body    Coordination Lower Body  WDL   Other Treatments   Other Treatments Provided pt able to don socks while sitting at EOB   Balance Assessment   Sitting Balance (Static) Normal   Sitting Balance (Dynamic) Normal   Standing Balance (Static) Normal   Standing Balance (Dynamic) Normal   Weight Shift Sitting Normal   Weight Shift Standing Normal   Bed Mobility    Comments at EOB upon arrival and exit   Gait Analysis   Gait Level Of Assist Supervised   Assistive Device None   Distance (Feet) 40   # of Times Distance was Traveled 1   Deviation No deviation   # of Stairs Climbed 6   Level of Assist with Stairs Supervised  (counter top)   Functional Mobility   Sit to Stand Independent   Bed, Chair, Wheelchair Transfer Independent   6 Clicks Assessment - How much HELP from from another person do  you currently need... (If the patient hasn't done an activity recently, how much help from another person do you think he/she would need if he/she tried?)   Turning from your back to your side while in a flat bed without using bedrails? 4   Moving from lying on your back to sitting on the side of a flat bed without using bedrails? 4   Moving to and from a bed to a chair (including a wheelchair)? 4   Standing up from a chair using your arms (e.g., wheelchair, or bedside chair)? 4   Walking in hospital room? 3   Climbing 3-5 steps with a railing? 3   6 clicks Mobility Score 22   Activity Tolerance   Comments no functional limitations   Education Group   Education Provided Role of Physical Therapist;Gait Training   Role of Physical Therapist Patient Response Patient;Family;Acceptance;Explanation;Verbal Demonstration   Gait Training Patient Response Patient;Family;Acceptance;Explanation;Action Demonstration   Additional Comments fall prevention with position changes   Physical Therapy Initial Treatment Plan    Duration Evaluation only   Problem List    Problems Pain   Anticipated Discharge Equipment and Recommendations   DC Equipment Recommendations None   Discharge Recommendations Recommend outpatient physical therapy services to address higher level deficits  (sister stated that pt is going to PCP soon and will ask for outpatient PT consult)   Interdisciplinary Plan of Care Collaboration   IDT Collaboration with  Nursing;Occupational Therapist  (notified OT that pt is independent with ADls and no needs)   Patient Position at End of Therapy Seated;Edge of Bed;Family / Friend in Room;Tray Table within Reach;Call Light within Reach   Collaboration Comments RN updated   Session Information   Date / Session Number  3/5 eval only

## 2025-03-05 NOTE — CARE PLAN
The patient is Stable - Low risk of patient condition declining or worsening    Shift Goals  Clinical Goals: Abx as ordered, safety,maintain O2 sats  Patient Goals: pain control, home  Family Goals: updates    Progress made toward(s) clinical / shift goals:    Problem: Respiratory  Goal: Patient will achieve/maintain optimum respiratory ventilation and gas exchange  Outcome: Progressing  Note: Sats maintained on supplemental O2, inc WOB with ambulation.      Problem: Pain - Standard  Goal: Alleviation of pain or a reduction in pain to the patient’s comfort goal  Outcome: Progressing  Note: Pt reports pain managed, 0-10 verbal scale utilized qshift and PRN       Patient is not progressing towards the following goals:

## 2025-03-05 NOTE — DOCUMENTATION QUERY
[facility]                                                                       Query Response Note      PATIENT:               SNEHA HO  ACCT #:                  [id number]  MRN:                     [id number]  :                      1950  ADMIT DATE:       3/3/2025 8:20 PM  DISCH DATE:          RESPONDING  PROVIDER #:        [id number]           QUERY TEXT:    ''Low suspicion for pyelonephritis'' is documented in the Medical Record.      Please clarify status of this condition:    *Note:  If you agree with a diagnosis provided, please remember to include it in your daily concurrent documentation and onto the Discharge Summary    The patient's Clinical Indicators include:  Findings:  --Patient admitted for neutrophilic leukocytosis and acute midline low back pain  --Per ED Provider Notes , ''Given the patient's fevers, bilateral flank pain and leukocytosis, I believe she likely has an     underlying urinary tract infection given the flank pain, possible early pyelonephritis'' documented  --Per H&P, ''In the absence of any urinary sx, flank pain and costophrenic angle tenderness, our suspicion for pyelonephritis is     very low'' documented  --Per PN , ''No CVA tenderness and does not look like pyelonephritis clinically'' documented  --Renal CT :  No urinary tract calculus identified, no renal collecting system dilatation, no evidence of inflammatory change in     the abdomen or pelvis  --MRI L spine :  Degenerative changes in the lumbar spine  --UA on  admission:  cloudy, moderate occult blood, protein 100, large leukocyte esterase, 21-50 wbc, 6-10 rbc, 6-10     epithelial cells, renal epithelial cells present, few bacteria, urine casts 3-5  --CRP on :  4.89  --Procalcitonin :  0.29    Treatment:  --Renal CT  --MRI L spine  --Urinalysis  --CRP/procalcitonin  --IV Unasyn 3g in NS 100ml IVPB every 6  hrs (now discontinued)  --Doxycycline tablet 100mg oral every 12 hours (now discontinued)    Risk Factors:  --Mid lower back pain  --Leukocytosis    Thank you,  Yolanda Calderon BSN, RN  Clinical   Connect via [place]  Options provided:   -- Pyelonephritis is ruled in   -- Pyelonephritis is ruled out   -- Other explanation, (please specify other explanation)      Query created by: Yolanda Calderon on 3/5/2025 9:24 AM    RESPONSE TEXT:    Pyelonephritis is ruled out          Electronically signed by:  KHADAR REYES MD 3/5/2025 2:19 PM

## 2025-03-05 NOTE — CARE PLAN
The patient is Stable - Low risk of patient condition declining or worsening    Shift Goals  Clinical Goals: abx, safety, monitor oxygen levels  Patient Goals: go home  Family Goals: SUSANA      Problem: Knowledge Deficit - Standard  Goal: Patient and family/care givers will demonstrate understanding of plan of care, disease process/condition, diagnostic tests and medications  Description: Target End Date:  1-3 days or as soon as patient condition allows    Document in Patient Education    1.  Patient and family/caregiver oriented to unit, equipment, visitation policy and means for communicating concern  2.  Complete/review Learning Assessment  3.  Assess knowledge level of disease process/condition, treatment plan, diagnostic tests and medications  4.  Explain disease process/condition, treatment plan, diagnostic tests and medications  Outcome: Progressing    NOTES: Updated patient on POC. Answered patient's questions regarding treatment plan. Patient verbalized understanding.      Problem: Respiratory  Goal: Patient will achieve/maintain optimum respiratory ventilation and gas exchange  Description: Target End Date:  Prior to discharge or change in level of care    Document on Assessment flowsheet    1.  Assess and monitor rate, rhythm, depth and effort of respiration  2.  Breath sounds assessed qshift and/or as needed  3.  Assess O2 saturation, administer/titrate oxygen as ordered  4.  Position patient for maximum ventilatory efficiency  5.  Turn, cough, and deep breath with splinting to improve effectiveness  6.  Collaborate with RT to administer medication/treatments per order  7.  Encourage use of incentive spirometer and encourage patient to cough after use and utilize splinting techniques if applicable  8.  Airway suctioning  9.  Monitor sputum production for changes in color, consistency and frequency  10. Perform frequent oral hygiene  11. Alternate physical activity with rest periods  Outcome:  Progressing    NOTES Patient on continuous pulse oxymetry monitoring. Assessed respiratory status. Weaned off oxygen. SP2 92% on room air.

## 2025-03-06 VITALS
SYSTOLIC BLOOD PRESSURE: 150 MMHG | WEIGHT: 147.05 LBS | RESPIRATION RATE: 18 BRPM | DIASTOLIC BLOOD PRESSURE: 78 MMHG | BODY MASS INDEX: 28.87 KG/M2 | HEART RATE: 67 BPM | HEIGHT: 60 IN | TEMPERATURE: 97.9 F | OXYGEN SATURATION: 95 %

## 2025-03-06 PROBLEM — M54.50 ACUTE MIDLINE LOW BACK PAIN: Status: RESOLVED | Noted: 2025-03-04 | Resolved: 2025-03-06

## 2025-03-06 PROBLEM — J96.01 ACUTE HYPOXIC RESPIRATORY FAILURE (HCC): Status: RESOLVED | Noted: 2025-03-04 | Resolved: 2025-03-06

## 2025-03-06 LAB
ANION GAP SERPL CALC-SCNC: 11 MMOL/L (ref 7–16)
BM IGG SER QL IF: NEGATIVE
BUN SERPL-MCNC: 19 MG/DL (ref 8–22)
C3 SERPL-MCNC: 144 MG/DL (ref 87–200)
C4 SERPL-MCNC: 28.7 MG/DL (ref 19–52)
CALCIUM SERPL-MCNC: 9.4 MG/DL (ref 8.5–10.5)
CHLORIDE SERPL-SCNC: 105 MMOL/L (ref 96–112)
CO2 SERPL-SCNC: 24 MMOL/L (ref 20–33)
CREAT SERPL-MCNC: 0.87 MG/DL (ref 0.5–1.4)
ERYTHROCYTE [DISTWIDTH] IN BLOOD BY AUTOMATED COUNT: 42.9 FL (ref 35.9–50)
GFR SERPLBLD CREATININE-BSD FMLA CKD-EPI: 70 ML/MIN/1.73 M 2
GLUCOSE BLD STRIP.AUTO-MCNC: 109 MG/DL (ref 65–99)
GLUCOSE BLD STRIP.AUTO-MCNC: 109 MG/DL (ref 65–99)
GLUCOSE SERPL-MCNC: 111 MG/DL (ref 65–99)
HAV IGM SERPL QL IA: NORMAL
HBV CORE IGM SER QL: NORMAL
HBV SURFACE AG SER QL: NORMAL
HCT VFR BLD AUTO: 41.8 % (ref 37–47)
HCV AB SER QL: NORMAL
HGB BLD-MCNC: 13.2 G/DL (ref 12–16)
MCH RBC QN AUTO: 28.8 PG (ref 27–33)
MCHC RBC AUTO-ENTMCNC: 31.6 G/DL (ref 32.2–35.5)
MCV RBC AUTO: 91.1 FL (ref 81.4–97.8)
MYELOPEROXIDASE AB SER-ACNC: 1 AU/ML (ref 0–19)
PLATELET # BLD AUTO: 198 K/UL (ref 164–446)
PMV BLD AUTO: 9.9 FL (ref 9–12.9)
POTASSIUM SERPL-SCNC: 4 MMOL/L (ref 3.6–5.5)
PROTEINASE3 AB SER-ACNC: 2 AU/ML (ref 0–19)
RBC # BLD AUTO: 4.59 M/UL (ref 4.2–5.4)
SODIUM SERPL-SCNC: 140 MMOL/L (ref 135–145)
WBC # BLD AUTO: 6.3 K/UL (ref 4.8–10.8)

## 2025-03-06 PROCEDURE — 86160 COMPLEMENT ANTIGEN: CPT | Mod: 91

## 2025-03-06 PROCEDURE — 80048 BASIC METABOLIC PNL TOTAL CA: CPT

## 2025-03-06 PROCEDURE — 83521 IG LIGHT CHAINS FREE EACH: CPT

## 2025-03-06 PROCEDURE — 86039 ANTINUCLEAR ANTIBODIES (ANA): CPT

## 2025-03-06 PROCEDURE — A9270 NON-COVERED ITEM OR SERVICE: HCPCS

## 2025-03-06 PROCEDURE — 82962 GLUCOSE BLOOD TEST: CPT

## 2025-03-06 PROCEDURE — 99239 HOSP IP/OBS DSCHRG MGMT >30: CPT | Performed by: INTERNAL MEDICINE

## 2025-03-06 PROCEDURE — 86038 ANTINUCLEAR ANTIBODIES: CPT

## 2025-03-06 PROCEDURE — 86334 IMMUNOFIX E-PHORESIS SERUM: CPT

## 2025-03-06 PROCEDURE — 700101 HCHG RX REV CODE 250

## 2025-03-06 PROCEDURE — 86225 DNA ANTIBODY NATIVE: CPT

## 2025-03-06 PROCEDURE — 700102 HCHG RX REV CODE 250 W/ 637 OVERRIDE(OP)

## 2025-03-06 PROCEDURE — 80074 ACUTE HEPATITIS PANEL: CPT

## 2025-03-06 PROCEDURE — 85027 COMPLETE CBC AUTOMATED: CPT

## 2025-03-06 RX ORDER — LIDOCAINE 4 G/G
1 PATCH TOPICAL EVERY 24 HOURS
Qty: 30 PATCH | Refills: 0 | Status: SHIPPED | OUTPATIENT
Start: 2025-03-07

## 2025-03-06 RX ADMIN — LIDOCAINE PAIN RELIEF 1 PATCH: 560 PATCH TOPICAL at 05:36

## 2025-03-06 RX ADMIN — ALLOPURINOL 300 MG: 300 TABLET ORAL at 05:36

## 2025-03-06 RX ADMIN — LOSARTAN POTASSIUM 50 MG: 50 TABLET, FILM COATED ORAL at 05:36

## 2025-03-06 RX ADMIN — METOPROLOL SUCCINATE 25 MG: 25 TABLET, EXTENDED RELEASE ORAL at 05:36

## 2025-03-06 RX ADMIN — DORZOLAMIDE HYDROCHLORIDE AND TIMOLOL MALEATE 1 DROP: 20; 5 SOLUTION/ DROPS OPHTHALMIC at 05:36

## 2025-03-06 ASSESSMENT — PAIN DESCRIPTION - PAIN TYPE
TYPE: ACUTE PAIN

## 2025-03-06 ASSESSMENT — PATIENT HEALTH QUESTIONNAIRE - PHQ9
SUM OF ALL RESPONSES TO PHQ9 QUESTIONS 1 AND 2: 0
SUM OF ALL RESPONSES TO PHQ9 QUESTIONS 1 AND 2: 0
1. LITTLE INTEREST OR PLEASURE IN DOING THINGS: NOT AT ALL
2. FEELING DOWN, DEPRESSED, IRRITABLE, OR HOPELESS: NOT AT ALL
1. LITTLE INTEREST OR PLEASURE IN DOING THINGS: NOT AT ALL
2. FEELING DOWN, DEPRESSED, IRRITABLE, OR HOPELESS: NOT AT ALL

## 2025-03-06 NOTE — DISCHARGE INSTRUCTIONS
Please follow up with your primary care provider in 1 to 2 weeks. They will manage your chronic medical conditions and administer refills if needed.  Please follow up with a nephrologist in 1 to 2 weeks to evaluate your abnormal urine with protein and blood. A referral has been placed.

## 2025-03-06 NOTE — CARE PLAN
The patient is Stable - Low risk of patient condition declining or worsening    Shift Goals  Clinical Goals: Manage cough, monitor resp status, VSS  Patient Goals: Feel better  Family Goals: Updates      Problem: Knowledge Deficit - Standard  Goal: Patient and family/care givers will demonstrate understanding of plan of care, disease process/condition, diagnostic tests and medications  Description: Target End Date:  1-3 days or as soon as patient condition allows    Document in Patient Education    1.  Patient and family/caregiver oriented to unit, equipment, visitation policy and means for communicating concern  2.  Complete/review Learning Assessment  3.  Assess knowledge level of disease process/condition, treatment plan, diagnostic tests and medications  4.  Explain disease process/condition, treatment plan, diagnostic tests and medications  Outcome: Met       Progress made toward(s) clinical / shift goals: discharge education provided prior to discharging pt to discharge lounge; meds and outpatient f/u's discussed. Patient and sister understand POC      Problem: Respiratory  Goal: Patient will achieve/maintain optimum respiratory ventilation and gas exchange  Description: Target End Date:  Prior to discharge or change in level of care    Document on Assessment flowsheet    1.  Assess and monitor rate, rhythm, depth and effort of respiration  2.  Breath sounds assessed qshift and/or as needed  3.  Assess O2 saturation, administer/titrate oxygen as ordered  4.  Position patient for maximum ventilatory efficiency  5.  Turn, cough, and deep breath with splinting to improve effectiveness  6.  Collaborate with RT to administer medication/treatments per order  7.  Encourage use of incentive spirometer and encourage patient to cough after use and utilize splinting techniques if applicable  8.  Airway suctioning  9.  Monitor sputum production for changes in color, consistency and frequency  10. Perform frequent oral  hygiene  11. Alternate physical activity with rest periods  Outcome: Met     Progress made toward(s) clinical / shift goals: patient on RA; TB ruled out and precautions discontinued by infection prevention. Edu provided about infection prevention prior to discharge     Patient is not progressing towards the following goals: NA

## 2025-03-06 NOTE — CARE PLAN
The patient is Stable - Low risk of patient condition declining or worsening    Shift Goals  Clinical Goals: review of diagnotics  Patient Goals: Tx plan for SOB  Family Goals: Updates    Progress made toward(s) clinical / shift goals:    Problem: Pain - Standard  Goal: Alleviation of pain or a reduction in pain to the patient’s comfort goal  Outcome: Progressing     Problem: Knowledge Deficit - Standard  Goal: Patient and family/care givers will demonstrate understanding of plan of care, disease process/condition, diagnostic tests and medications  Outcome: Progressing       Patient is not progressing towards the following goals:    NOTE: patient is self managing pain and understands Tx goals

## 2025-03-06 NOTE — DISCHARGE SUMMARY
[facility] Internal Medicine Discharge Summary    Attending: Ace Jones M.d.  Senior Resident: Dr. Richard Regalado  Intern:  Dr. Bob Mota  Contact Number: [phone]    CHIEF COMPLAINT ON ADMISSION  Chief Complaint   Patient presents with    Back Pain     Low back pain with fever 103 at 1730, took APAP 500mg at 1800.       Reason for Admission  Fever; Back pain     Admission Date  3/3/2025    CODE STATUS  Full Code    HPI & HOSPITAL COURSE  Pt is a 74/M with a PMH of Hypertension, Hyperlipidemia and Gout who presented 3/3/2025 with a 1-day history of fever, lower back pain and cough. Her fever reached 103F for which she took Tylenol without relief. She also complained of lower back pain and productive cough. She denied chest pains, shortness of breath, palpitations, dysuria. She was admitted for leukocytosis and fever.    ED COURSE:  Vital SIGNS: T: 99, HR: 106 bpm, BP: 144/66, SpO2: 91 % on RA  Blood Work-up: CBC shows Neutrophilic Leukocytosis with WBC of 17.6 and Neutrophils of 87, Chem Panel shows normal RFTs with Cr: 0.93 and BUN: 21, Lactic Acid wnl at 1.7, UA shows 11-20 WBC and small leukocyte esterase.  Imaging: Chest X-ray shows  interstitial infiltrates of the right middle lobe and blunting of the left costophrenic angle  Patient received Cefazolin 2g and 1L of NS bolus in the ED    Bacterial pneumonia, UTI, pyelonephritis, MRSA were ruled out as causes for her leukocytosis and fever, and blood cultures were negative; therefore, antibiotics were stopped. Due to slightly abnormal chest xray and the fact that she is from the [facility], [place] with higher rates of tuberculosis, she was worked up for TB, which was subsequently negative. We also requested for an ANCA panel, anti-GBM, GABRIELA, anti-dsDNA, serum free light chains, serum immunofixation to evaluate for a pulmonary-renal syndrome given her upper respiratory symptoms and history of proteinuria with blood x years (according to pt's sister).  Hepatitis panel was non-reactive, and there was no C3 or C4 complement deficiency. Back pain was evaluated with MRI which showed degenerative changes of lumbar spine and was treated with lidocaine patches.    Pt was advised to follow up closely with nephrology for further evaluation on possible pulmonary-renal syndrome. She was also advised to follow up with her primary care medications to manage her chronic conditions such as her back pain, diabetes, HTN, HLD, gout.      Therefore, she is discharged in good and stable condition to home with close outpatient follow-up.    The patient met 2-midnight criteria for an inpatient stay at the time of discharge.    Discharge Date  3/6/25    Physical Exam on Day of Discharge  Physical Exam  HENT:      Head: Normocephalic.      Nose: Nose normal.      Mouth/Throat:      Mouth: Mucous membranes are moist.   Cardiovascular:      Rate and Rhythm: Normal rate and regular rhythm.      Heart sounds: Normal heart sounds.   Pulmonary:      Effort: Pulmonary effort is normal. No respiratory distress.      Breath sounds: Normal breath sounds. No wheezing or rales.   Chest:      Chest wall: No tenderness.   Abdominal:      General: Abdomen is flat.   Musculoskeletal:         General: Normal range of motion.      Cervical back: Normal range of motion.   Skin:     General: Skin is warm.   Neurological:      General: No focal deficit present.      Mental Status: She is alert.         FOLLOW UP ITEMS POST DISCHARGE  Follow up with your primary care provider in 1 to 2 weeks to manage chronic conditions.  Follow up with a nephrologist in 1 to 2 weeks to evaluate proteinuria and blood. They will need to follow up on the autoimmune workup started as we are trying to rule out a pulmonary-renal syndrome that can be causing all her symptoms.    DISCHARGE DIAGNOSES  Principal Problem (Resolved):    Acute hypoxic respiratory failure (HCC) (POA: Yes)  Active Problems:    Gout (POA: Yes)       Overview: Stable condition for which patient is taking allopurinol 300 mg daily.  No       recent exacerbation of this condition.    DM (diabetes mellitus) (HCC) (POA: Yes)      Overview: Chronic condition  Patient taking atorvastatin 40 mg daily and eating a       healthy diet and getting adequate exercise.  No side effects reported from       medication.      Lab Results       Component Value Date/Time        CHOLSTRLTOT 217 (H) 02/16/2024 08:40 AM        CHOLSTRLTOT 188 08/05/2023 08:17 AM         (H) 02/16/2024 08:40 AM         (H) 08/05/2023 08:17 AM        HDL 68 02/16/2024 08:40 AM        HDL 57 08/05/2023 08:17 AM        TRIGLYCERIDE 91 02/16/2024 08:40 AM        TRIGLYCERIDE 144 08/05/2023 08:17 AM                             Hypertension (POA: Yes)      Overview: Chronic stable condition for which patient takes losartan 50 mg daily and       metoprolol 25 mg daily.  No reported side effects. Blood pressure today in       clinic is 120/60    Hyperlipidemia (POA: Yes)      Overview: -Continue Rosuvastatin 40 mg daily  Resolved Problems:    Acute midline low back pain (POA: Yes)      FOLLOW UP  Future Appointments   Date Time Provider Department Center   8/14/2025  4:40 PM [address]     No follow-up provider specified.    MEDICATIONS ON DISCHARGE     Medication List        START taking these medications        Instructions   lidocaine 4 % Ptch  Start taking on: March 7, 2025  Commonly known as: Asperflex   Place 1 Patch on the skin every 24 hours.  Dose: 1 Patch            CONTINUE taking these medications        Instructions   allopurinol 300 MG Tabs  Commonly known as: Zyloprim   Take 1 tablet by mouth once daily  Dose: 300 mg     dorzolamide-timolol 2-0.5 % Soln  Commonly known as: Cosopt   Administer 1 Drop into both eyes 2 times a day.  Dose: 1 Drop     latanoprost 0.005 % Soln  Commonly known as: Xalatan   Administer 1 Drop into both eyes every evening.  Dose: 1  Drop     losartan 50 MG Tabs  Commonly known as: Cozaar   Take 1 tablet by mouth once daily  Dose: 50 mg     metFORMIN 500 MG Tabs  Commonly known as: Glucophage   TAKE 1 TABLET BY MOUTH TWICE DAILY WITH MEALS     metoprolol SR 25 MG Tb24  Commonly known as: Toprol XL   Take 1 Tablet by mouth every day.  Dose: 25 mg     rosuvastatin 20 MG Tabs  Commonly known as: Crestor   Take 1 Tablet by mouth every evening.  Dose: 20 mg              Allergies  Allergies   Allergen Reactions    Lisinopril Cough     cough       DIET  Orders Placed This Encounter   Procedures    Diet Order Diet: Consistent CHO (Diabetic)     Standing Status:   Standing     Number of Occurrences:   1     Diet::   Consistent CHO (Diabetic) [4]       ACTIVITY  As tolerated.  Weight bearing as tolerated    CONSULTATIONS  N/A    PROCEDURES  N/A    LABORATORY  Lab Results   Component Value Date    SODIUM 140 03/06/2025    POTASSIUM 4.0 03/06/2025    CHLORIDE 105 03/06/2025    CO2 24 03/06/2025    GLUCOSE 111 (H) 03/06/2025    BUN 19 03/06/2025    CREATININE 0.87 03/06/2025        Lab Results   Component Value Date    WBC 6.3 03/06/2025    HEMOGLOBIN 13.2 03/06/2025    HEMATOCRIT 41.8 03/06/2025    PLATELETCT 198 03/06/2025        Total time of the discharge process exceeds 60 minutes.

## 2025-03-06 NOTE — THERAPY
Occupational Therapy Contact Note    Patient Name: Sue Can  Age:  74 y.o., Sex:  female  Medical Record #: [id number]  Today's Date: 3/5/2025    OT orders received. Pt with a 6 Clicks score of 24. Spoke with PT who reported that the pt is at her baseline function and does not have any acute OT needs. Will complete OT orders at this time, please re-consult should the pt have a change in status.

## 2025-03-06 NOTE — PROGRESS NOTES
Sue Can has been provided discharge instructions, to include follow up care, home medications, and activity/diet reviewed. Copy of discharge instructions in patient chart, signed and reviewed. Patient verbalizes the understanding of the discharge instructions. PIV removed by bedside RN. Arm band removed. Patient did not have home meds during admit. Pt requesting work note, bedside RN Emily notified. Questions and concerns addressed prior to leaving the discharge lounge. Transported via car, accompanied by family. Patient discharge to home.

## 2025-03-07 ENCOUNTER — PATIENT OUTREACH (OUTPATIENT)
Dept: MEDICAL GROUP | Facility: PHYSICIAN GROUP | Age: 75
End: 2025-03-07
Payer: COMMERCIAL

## 2025-03-07 LAB
DSDNA AB TITR SER CLIF: NORMAL {TITER}
NUCLEAR IGG SER QL IA: DETECTED

## 2025-03-07 NOTE — PROGRESS NOTES
Transitional Care Management  TCM Outreach Date and Time: Filed (3/7/2025 11:31 AM)    Discharge Questions  Actual Discharge Date: 03/06/25  Now that you are home, how are you feeling?: Good  Did you receive any new prescriptions?: Yes (Prescribed lidocaine patches)  Were you able to get them filled?: Yes  Meds to Bed or Pharmacy filled?: Pharmacy (Sent to [facility] pharmacy on [facility])  Do you have any questions about your current medications or new medications (Review Med Rec)?: No  Did you have any durable medical equipment ordered?: No  Do you have a follow up appointment scheduled with your PCP?: Yes  Appointment Date: 03/10/25  Appointment Time: 1520  Any issues or paperwork you wish to discuss with your PCP?: No  Are you (patient) able to get to the appointment?: Yes  If Home Health was ordered, have they contacted you (Patient): Not Applicable  Did you have enough support after your last discharge?: Yes (family support)  Does this patient qualify for the CCM program?: No    Transitional Care  Number of attempts made to contact patient: 1  Current or previous attempts completed within two business days of discharge? : Yes  Provided education regarding treatment plan, medications, self-management, ADLs?: Yes (ER precautions reviewed. Reviewed instructions to follow up with both PCP and nephrologist.)  Has patient completed an Advanced Directive?: Yes  Is the patient's advanced directive on file?: Yes  Has the Care Manager's phone number provided?: No  Is there anything else I can help you with?: No    Discharge Summary  Chief Complaint: Back pain  Admitting Diagnosis: Pyelonephritis  Discharge Diagnosis: Acute hypoxic respiratory failure      Patient states no other questions, concerns, or needs at this time.

## 2025-03-08 LAB
ANA PAT SER IF-IMP: NORMAL
INTERPRETATION SERPL IFE-IMP: NORMAL
KAPPA LC FREE SER-MCNC: 28.35 MG/L (ref 3.3–19.4)
KAPPA LC FREE/LAMBDA FREE SER NEPH: 1.17 {RATIO} (ref 0.26–1.65)
LAMBDA LC FREE SERPL-MCNC: 24.2 MG/L (ref 5.71–26.3)
NUCLEAR IGG SER QL IF: NORMAL
PATHOLOGY STUDY: NORMAL

## 2025-03-09 LAB
BACTERIA BLD CULT: NORMAL
BACTERIA BLD CULT: NORMAL
SIGNIFICANT IND 70042: NORMAL
SIGNIFICANT IND 70042: NORMAL
SITE SITE: NORMAL
SITE SITE: NORMAL
SOURCE SOURCE: NORMAL
SOURCE SOURCE: NORMAL

## 2025-03-10 ENCOUNTER — HOSPITAL ENCOUNTER (OUTPATIENT)
Dept: RADIOLOGY | Facility: MEDICAL CENTER | Age: 75
End: 2025-03-10
Payer: COMMERCIAL

## 2025-03-10 ENCOUNTER — OFFICE VISIT (OUTPATIENT)
Dept: MEDICAL GROUP | Facility: PHYSICIAN GROUP | Age: 75
End: 2025-03-10
Payer: COMMERCIAL

## 2025-03-10 VITALS
BODY MASS INDEX: 28.03 KG/M2 | DIASTOLIC BLOOD PRESSURE: 60 MMHG | TEMPERATURE: 99.5 F | HEIGHT: 60 IN | WEIGHT: 142.8 LBS | OXYGEN SATURATION: 95 % | HEART RATE: 85 BPM | SYSTOLIC BLOOD PRESSURE: 90 MMHG

## 2025-03-10 DIAGNOSIS — R05.1 ACUTE COUGH: ICD-10-CM

## 2025-03-10 DIAGNOSIS — R06.2 WHEEZING: ICD-10-CM

## 2025-03-10 DIAGNOSIS — Z09 HOSPITAL DISCHARGE FOLLOW-UP: Primary | ICD-10-CM

## 2025-03-10 DIAGNOSIS — I70.90 ATHEROSCLEROSIS OF ARTERIES: ICD-10-CM

## 2025-03-10 DIAGNOSIS — M51.369 BULGING OF LUMBAR INTERVERTEBRAL DISC: ICD-10-CM

## 2025-03-10 DIAGNOSIS — M25.562 CHRONIC PAIN OF BOTH KNEES: ICD-10-CM

## 2025-03-10 DIAGNOSIS — M25.561 CHRONIC PAIN OF BOTH KNEES: ICD-10-CM

## 2025-03-10 DIAGNOSIS — E27.9 ADRENAL NODULE (HCC): ICD-10-CM

## 2025-03-10 DIAGNOSIS — G89.29 CHRONIC PAIN OF BOTH KNEES: ICD-10-CM

## 2025-03-10 DIAGNOSIS — M51.360 DEGENERATION OF INTERVERTEBRAL DISC OF LUMBAR REGION WITH DISCOGENIC BACK PAIN: ICD-10-CM

## 2025-03-10 DIAGNOSIS — R50.9 RESPIRATORY ILLNESS WITH FEVER: ICD-10-CM

## 2025-03-10 DIAGNOSIS — J98.9 RESPIRATORY ILLNESS WITH FEVER: ICD-10-CM

## 2025-03-10 DIAGNOSIS — R16.0 HEPATOMEGALY: ICD-10-CM

## 2025-03-10 PROCEDURE — 73565 X-RAY EXAM OF KNEES: CPT

## 2025-03-10 RX ORDER — ALBUTEROL SULFATE 90 UG/1
2 INHALANT RESPIRATORY (INHALATION) EVERY 4 HOURS PRN
Qty: 1 EACH | Refills: 0 | Status: SHIPPED | OUTPATIENT
Start: 2025-03-10

## 2025-03-10 RX ORDER — DEXTROMETHORPHAN HYDROBROMIDE AND PROMETHAZINE HYDROCHLORIDE 15; 6.25 MG/5ML; MG/5ML
5 SYRUP ORAL EVERY 4 HOURS PRN
Qty: 120 ML | Refills: 0 | Status: SHIPPED | OUTPATIENT
Start: 2025-03-10

## 2025-03-10 ASSESSMENT — ENCOUNTER SYMPTOMS
DIARRHEA: 0
BLURRED VISION: 0
COUGH: 1
SPUTUM PRODUCTION: 1
VOMITING: 0
WHEEZING: 1
NAUSEA: 0
WEIGHT LOSS: 0
CONSTIPATION: 0
MYALGIAS: 0
FEVER: 0
DIZZINESS: 0
WEAKNESS: 0
HEADACHES: 0
CHILLS: 0
SHORTNESS OF BREATH: 0
ABDOMINAL PAIN: 0

## 2025-03-10 ASSESSMENT — FIBROSIS 4 INDEX: FIB4 SCORE: 2.52

## 2025-03-10 NOTE — PROGRESS NOTES
Subjective:     Sue Can is a 74 y.o. female who presents for Hospital Follow-up.    Transitional Care Management  TCM Outreach Date and Time: Filed (3/7/2025 11:31 AM)    Discharge Questions  Actual Discharge Date: 03/06/25  Now that you are home, how are you feeling?: Good  Did you receive any new prescriptions?: Yes (Prescribed lidocaine patches)  Were you able to get them filled?: Yes  Meds to Bed or Pharmacy filled?: Pharmacy (Sent to [facility] pharmacy on [facility])  Do you have any questions about your current medications or new medications (Review Med Rec)?: No  Did you have any durable medical equipment ordered?: No  Do you have a follow up appointment scheduled with your PCP?: Yes  Appointment Date: 03/10/25  Appointment Time: 1520  Any issues or paperwork you wish to discuss with your PCP?: No  Are you (patient) able to get to the appointment?: Yes  If Home Health was ordered, have they contacted you (Patient): Not Applicable  Did you have enough support after your last discharge?: Yes (family support)  Does this patient qualify for the CCM program?: No    Transitional Care  Number of attempts made to contact patient: 1  Current or previous attempts completed within two business days of discharge? : Yes  Provided education regarding treatment plan, medications, self-management, ADLs?: Yes (ER precautions reviewed. Reviewed instructions to follow up with both PCP and nephrologist.)  Has patient completed an Advanced Directive?: Yes  Is the patient's advanced directive on file?: Yes  Has the Care Manager's phone number provided?: No  Is there anything else I can help you with?: No    Discharge Summary  Chief Complaint: Back pain  Admitting Diagnosis: Pyelonephritis  Discharge Diagnosis: Acute hypoxic respiratory failure        HPI:   Recently hospitalized for   History of Present Illness  The patient is a 74-year-old female who presents for evaluation of cough, wheezing, back pain,  diabetes, hypertension, hyperlipidemia, gout, and adrenal nodule. She is accompanied by her sister.    She was admitted to the hospital on 03/03/2025 and discharged on 03/06/2025. During her hospital stay, she experienced a fever of 103 degrees at home, which was managed with Tylenol. She also reported lower back pain and a cough. Her cough has improved slightly, but she continues to produce saliva. She received one or two doses of antibiotics during her hospital stay. She has not had a fever since her discharge. She reports no nausea, vomiting, diarrhea, fevers, or chills. She has been experiencing a dry cough at night that disrupts her sleep. She has been using Vicks for symptom relief.    She has an upcoming appointment with orthopedics on Friday. She underwent a knee x-ray this morning and has expressed a preference against surgical intervention. She has been using Biofreeze for her back pain, which she finds helpful.    She has an appointment with Dr. Forrester next month for laser treatment in both eyes. She got new eyeglasses in November.    She has been experiencing shortness of breath and wheezing when walking or running fast.    MEDICATIONS  Current: Biofreeze        Current medicines (including reconciliation performed today)  Current Outpatient Medications   Medication Sig Dispense Refill    promethazine-dextromethorphan (PROMETHAZINE-DM) 6.25-15 MG/5ML syrup Take 5 mL by mouth every four hours as needed for Cough. 120 mL 0    albuterol 108 (90 Base) MCG/ACT Aero Soln inhalation aerosol Inhale 2 Puffs every four hours as needed for Shortness of Breath. 1 Each 0    lidocaine (ASPERFLEX) 4 % Patch Place 1 Patch on the skin every 24 hours. 30 Patch 0    dorzolamide-timolol (COSOPT) 2-0.5 % Solution Administer 1 Drop into both eyes 2 times a day.      allopurinol (ZYLOPRIM) 300 MG Tab Take 1 tablet by mouth once daily 90 Tablet 0    losartan (COZAAR) 50 MG Tab Take 1 tablet by mouth once daily 90 Tablet 0     metFORMIN (GLUCOPHAGE) 500 MG Tab TAKE 1 TABLET BY MOUTH TWICE DAILY WITH MEALS 180 Tablet 0    metoprolol SR (TOPROL XL) 25 MG TABLET SR 24 HR Take 1 Tablet by mouth every day. 90 Tablet 1    rosuvastatin (CRESTOR) 20 MG Tab Take 1 Tablet by mouth every evening. 30 Tablet 11    latanoprost (XALATAN) 0.005 % Solution Administer 1 Drop into both eyes every evening.       No current facility-administered medications for this visit.       Allergies:   Lisinopril    Social History     Tobacco Use    Smoking status: Never    Smokeless tobacco: Never   Vaping Use    Vaping status: Never Used   Substance Use Topics    Alcohol use: Yes     Alcohol/week: 0.0 oz     Comment: occasional    Drug use: No       ROS:  Review of Systems   Constitutional:  Negative for chills, fever, malaise/fatigue and weight loss.   Eyes:  Negative for blurred vision.   Respiratory:  Positive for cough, sputum production and wheezing. Negative for shortness of breath.    Cardiovascular:  Negative for chest pain.   Gastrointestinal:  Negative for abdominal pain, constipation, diarrhea, nausea and vomiting.   Musculoskeletal:  Negative for myalgias.   Neurological:  Negative for dizziness, weakness and headaches.         Objective:     Vitals:    03/10/25 1501   BP: 90/60   BP Location: Left arm   Patient Position: Sitting   BP Cuff Size: Adult   Pulse: 85   Temp: 37.5 °C (99.5 °F)   TempSrc: Temporal   SpO2: 95%   Weight: 64.8 kg (142 lb 12.8 oz)   Height: 1.524 m (5')     Body mass index is 27.89 kg/m².    Physical Exam:  Physical Exam  Constitutional:       General: She is not in acute distress.     Appearance: Normal appearance. She is not ill-appearing or toxic-appearing.   HENT:      Head: Normocephalic.   Eyes:      Conjunctiva/sclera: Conjunctivae normal.   Cardiovascular:      Rate and Rhythm: Normal rate and regular rhythm.      Pulses: Normal pulses.      Heart sounds: Normal heart sounds. No murmur heard.  Pulmonary:      Effort:  Pulmonary effort is normal. No respiratory distress.      Breath sounds: Normal breath sounds.   Skin:     General: Skin is warm and dry.   Neurological:      General: No focal deficit present.      Mental Status: She is alert and oriented to person, place, and time.   Psychiatric:         Mood and Affect: Mood normal.         Behavior: Behavior normal.           Assessment and Plan:   1. Hospital discharge follow-up    2. Respiratory illness with fever    3. Degeneration of intervertebral disc of lumbar region with discogenic back pain  - Referral to Pain Clinic    4. Bulging of lumbar intervertebral disc  - Referral to Pain Clinic    5. Acute cough  - promethazine-dextromethorphan (PROMETHAZINE-DM) 6.25-15 MG/5ML syrup; Take 5 mL by mouth every four hours as needed for Cough.  Dispense: 120 mL; Refill: 0  - albuterol 108 (90 Base) MCG/ACT Aero Soln inhalation aerosol; Inhale 2 Puffs every four hours as needed for Shortness of Breath.  Dispense: 1 Each; Refill: 0    6. Adrenal nodule (HCC)    7. Hepatomegaly    8. Atherosclerosis of arteries    9. Wheezing  - albuterol 108 (90 Base) MCG/ACT Aero Soln inhalation aerosol; Inhale 2 Puffs every four hours as needed for Shortness of Breath.  Dispense: 1 Each; Refill: 0    Assessment & Plan  1. Respiratory infection.  Her condition is improving. She has been tested negative for TB and COVID-19. Her chest x-ray remains stable with minimal interstitial prominence, showing no change from the previous day's examination. The CT scan of her chest reveals linear densities at the bilateral lung bases, without any lymph node enlargement. There are trace bilateral pleural effusions present. The CT scan of her pelvis indicates hepatomegaly and a 2 cm left adrenal nodule. Additionally, there is evidence of plaque in her arteries. The C-reactive protein levels suggest potential atelectasis. Promethazine syrup 5 mL at night has been prescribed to manage her cough. She is advised to  expectorate during the day to prevent pneumonia. An albuterol inhaler has been prescribed for use as needed to alleviate her cough and wheezing. She is instructed to seek immediate medical attention at the ER if she experiences difficulty breathing, fever, chills, or any other concerning symptoms.    2. Degenerative disc disease of the lumbar spine.  She has degenerative changes in the lumbar spine with mild diffuse disk bulge at L5-S1, L4-5 disk bulge, L3-4 disk bulge, L2-3 disk bulge, and L1-2 disk bulge. A referral to the physiatry team has been made for further management, which may include steroid injections. She is advised to continue using Biofreeze for symptomatic relief.    3. Osteoarthritis of the knees.  She has moderate to marked osteoarthritic changes in her knees. She will follow up with orthopedics to discuss potential treatment options, including steroid injections or knee replacement surgery if necessary.    4. Diabetes mellitus.  Her A1C level was 7.4. She is scheduled for labs, including microalbumin, A1C, lipid panel, and metabolic panel, before her next appointment in August 2025. She is advised to continue her current medication regimen and follow up with her eye doctor for diabetic retinal screening results.    5. Hypertension.  Her blood pressure is well-controlled at 90/60. She is advised to continue her current medication regimen.    6. Hyperlipidemia.  She is scheduled for a lipid panel before her next appointment in August 2025. She is advised to continue her current medication regimen.    7. Gout.  She is advised to continue her current medication regimen and follow up with her primary care physician for management.    8. Adrenal nodule.  A 2 cm left adrenal nodule was noted on the CT scan. A follow-up CT scan of her abdomen will be ordered in March 2026 to monitor the adrenal nodule.    9. Hepatomegaly.  An enlarged liver was noted on the CT scan. She is advised to follow up with her  primary care physician for further evaluation and management.    10. Atherosclerosis.  Plaque in her arteries was noted on the CT scan. She is advised to continue her current medication regimen and follow up with her primary care physician for management.    Follow-up  The patient is scheduled for a follow-up visit in August 2025, or earlier if necessary.        - Chart and discharge summary were reviewed.   - Hospitalization and results reviewed with patient.   - Medications reviewed including instructions regarding high risk medications, dosing and side effects.  - Recommended Services: No services needed at this time  - Advance directive/POLST on file?  Yes    Follow-up:Return in about 5 months (around 8/10/2025), or if symptoms worsen or fail to improve.    Face-to-face transitional care management services with [facility] (today's visit is within days post discharge & LACE+ score 59+) medical decision complexity were provided.

## 2025-03-18 ENCOUNTER — APPOINTMENT (OUTPATIENT)
Dept: RADIOLOGY | Facility: IMAGING CENTER | Age: 75
End: 2025-03-18
Attending: STUDENT IN AN ORGANIZED HEALTH CARE EDUCATION/TRAINING PROGRAM
Payer: COMMERCIAL

## 2025-03-18 ENCOUNTER — OFFICE VISIT (OUTPATIENT)
Dept: URGENT CARE | Facility: PHYSICIAN GROUP | Age: 75
End: 2025-03-18
Payer: COMMERCIAL

## 2025-03-18 VITALS
OXYGEN SATURATION: 94 % | HEART RATE: 93 BPM | TEMPERATURE: 98 F | HEIGHT: 61 IN | WEIGHT: 143 LBS | DIASTOLIC BLOOD PRESSURE: 72 MMHG | SYSTOLIC BLOOD PRESSURE: 108 MMHG | RESPIRATION RATE: 20 BRPM | BODY MASS INDEX: 27 KG/M2

## 2025-03-18 DIAGNOSIS — R50.9 FEVER, UNSPECIFIED FEVER CAUSE: ICD-10-CM

## 2025-03-18 DIAGNOSIS — R05.9 COUGH, UNSPECIFIED TYPE: ICD-10-CM

## 2025-03-18 DIAGNOSIS — J10.1 INFLUENZA A: ICD-10-CM

## 2025-03-18 LAB
FLUAV RNA SPEC QL NAA+PROBE: POSITIVE
FLUBV RNA SPEC QL NAA+PROBE: NEGATIVE
RSV RNA SPEC QL NAA+PROBE: NEGATIVE
SARS-COV-2 RNA RESP QL NAA+PROBE: NEGATIVE

## 2025-03-18 PROCEDURE — 0241U POCT CEPHEID COV-2, FLU A/B, RSV - PCR: CPT | Performed by: STUDENT IN AN ORGANIZED HEALTH CARE EDUCATION/TRAINING PROGRAM

## 2025-03-18 PROCEDURE — 99214 OFFICE O/P EST MOD 30 MIN: CPT | Performed by: STUDENT IN AN ORGANIZED HEALTH CARE EDUCATION/TRAINING PROGRAM

## 2025-03-18 PROCEDURE — 71046 X-RAY EXAM CHEST 2 VIEWS: CPT | Mod: TC | Performed by: RADIOLOGY

## 2025-03-18 RX ORDER — BENZONATATE 100 MG/1
100 CAPSULE ORAL 3 TIMES DAILY PRN
Qty: 60 CAPSULE | Refills: 0 | Status: SHIPPED | OUTPATIENT
Start: 2025-03-18

## 2025-03-18 ASSESSMENT — ENCOUNTER SYMPTOMS
SORE THROAT: 1
FEVER: 1
PALPITATIONS: 0
MYALGIAS: 0
CHILLS: 0
WHEEZING: 0
COUGH: 1
SHORTNESS OF BREATH: 0

## 2025-03-18 ASSESSMENT — FIBROSIS 4 INDEX: FIB4 SCORE: 2.52

## 2025-03-18 NOTE — LETTER
March 18, 2025    To Whom It May Concern:         This is confirmation that Sue Can attended her scheduled appointment with Maggie Rodriguez P.A.-C. on 3/18/25. Please excuse work absences 3/17/25 through 3/21/25 for medical reasons.  Sue can return to work without restrictions on 3/22/25 or earlier as long as symptoms have improved/resolved and there has been no fever for 24 hours.        Sincerely,      Maggie Rodriguez P.A.-C.  [phone]

## 2025-03-18 NOTE — PROGRESS NOTES
"Subjective     Sue Puja Can is a 74 y.o. female who presents with Cough (Fever,sore throat,x2 weeks)            Sue is a 73 y.o. female who presents to urgent care with fever and cough.  Patient had cough 2 weeks ago which did resolve.  Sunday cough redeveloped.  Patient developed fever yesterday and had fever this morning.  She also has a sore throat.  No body aches.  No SOB/wheezing.  Patient was recently admitted to the hospital for respiratory failure at the beginning of the month. Patient needs work note for today.        Review of Systems   Constitutional:  Positive for fever. Negative for chills.   HENT:  Positive for sore throat. Negative for congestion and ear pain.    Respiratory:  Positive for cough. Negative for shortness of breath and wheezing.    Cardiovascular:  Negative for palpitations.   Musculoskeletal:  Negative for myalgias.   All other systems reviewed and are negative.             Objective     /72   Pulse 93   Temp 36.7 °C (98 °F) (Temporal)   Resp 20   Ht 1.549 m (5' 1\")   Wt 64.9 kg (143 lb)   LMP  (LMP Unknown)   SpO2 94%   BMI 27.02 kg/m²      Physical Exam  Vitals reviewed.   Constitutional:       General: She is not in acute distress.     Appearance: Normal appearance. She is not toxic-appearing.   HENT:      Head: Normocephalic and atraumatic.      Right Ear: Tympanic membrane, ear canal and external ear normal.      Left Ear: Tympanic membrane, ear canal and external ear normal.      Nose: Nose normal.      Mouth/Throat:      Lips: Pink.      Mouth: Mucous membranes are moist.      Pharynx: Oropharynx is clear. Uvula midline.   Eyes:      Extraocular Movements: Extraocular movements intact.      Conjunctiva/sclera: Conjunctivae normal.      Pupils: Pupils are equal, round, and reactive to light.   Cardiovascular:      Rate and Rhythm: Normal rate.   Pulmonary:      Effort: Pulmonary effort is normal. No respiratory distress.      Breath sounds: Normal breath " sounds. No stridor. No wheezing, rhonchi or rales.   Skin:     General: Skin is warm and dry.   Neurological:      General: No focal deficit present.      Mental Status: She is alert and oriented to person, place, and time.                                  Assessment & Plan  Influenza A  - POSITIVE for FLU A.    Fever, unspecified fever cause  - Fever x 2 days.     Orders:    POCT CoV-2, Flu A/B, RSV by PCR    Cough, unspecified type  - Cough for 2 weeks which was improved/resolved. Cough re-developed on Sunday.   - DX-CHEST IMPRESSION: 1. No active cardiopulmonary abnormalities are identified.       Orders:    DX-CHEST-2 VIEWS    benzonatate (TESSALON) 100 MG Cap; Take 1 Capsule by mouth 3 times a day as needed for Cough.            Latest Reference Range & Units 03/18/25 17:01   Influenza virus A RNA Negative, Invalid  Positive !   Influenza virus B, PCR Negative, Invalid  Negative   RSV, PCR Negative, Invalid  Negative   SARS-CoV-2 by PCR Negative, Invalid  Negative   !: Data is abnormal          Differential diagnoses, supportive care measures and indications for immediate follow-up discussed with patient. Pathogenesis of diagnosis discussed including typical length and natural progression.      Instructed to return to urgent care or nearest emergency department if symptoms fail to improve, for any change in condition, further concerns, or new concerning symptoms.    Patient states understanding and agrees with the plan of care and discharge instructions.       My total time spent caring for the patient on the day of the encounter was 35 minutes including obtain patient history, physical exam, discussing differential diagnosis, plan of care, supportive care, appropriate follow-up, indications for immediate follow-up. This does not include time spent on separately billable procedures/tests.

## 2025-03-24 ENCOUNTER — APPOINTMENT (OUTPATIENT)
Dept: PHYSICAL MEDICINE AND REHAB | Facility: MEDICAL CENTER | Age: 75
End: 2025-03-24
Payer: COMMERCIAL

## 2025-03-24 ENCOUNTER — TELEPHONE (OUTPATIENT)
Dept: HEALTH INFORMATION MANAGEMENT | Facility: OTHER | Age: 75
End: 2025-03-24

## 2025-05-13 DIAGNOSIS — E78.5 DYSLIPIDEMIA ASSOCIATED WITH TYPE 2 DIABETES MELLITUS (HCC): ICD-10-CM

## 2025-05-13 DIAGNOSIS — E11.69 DYSLIPIDEMIA ASSOCIATED WITH TYPE 2 DIABETES MELLITUS (HCC): ICD-10-CM

## 2025-05-13 RX ORDER — ROSUVASTATIN CALCIUM 20 MG/1
20 TABLET, COATED ORAL EVERY EVENING
Qty: 90 TABLET | Refills: 3 | Status: SHIPPED | OUTPATIENT
Start: 2025-05-13

## 2025-08-07 ENCOUNTER — HOSPITAL ENCOUNTER (OUTPATIENT)
Dept: LAB | Facility: MEDICAL CENTER | Age: 75
End: 2025-08-07
Payer: COMMERCIAL

## 2025-08-07 DIAGNOSIS — E11.65 TYPE 2 DIABETES MELLITUS WITH HYPERGLYCEMIA, WITHOUT LONG-TERM CURRENT USE OF INSULIN (HCC): ICD-10-CM

## 2025-08-07 DIAGNOSIS — E11.29 TYPE 2 DIABETES MELLITUS WITH MICROALBUMINURIA, WITHOUT LONG-TERM CURRENT USE OF INSULIN (HCC): ICD-10-CM

## 2025-08-07 DIAGNOSIS — R80.9 TYPE 2 DIABETES MELLITUS WITH MICROALBUMINURIA, WITHOUT LONG-TERM CURRENT USE OF INSULIN (HCC): ICD-10-CM

## 2025-08-07 DIAGNOSIS — M10.9 GOUT, ARTHRITIS: ICD-10-CM

## 2025-08-07 DIAGNOSIS — I10 ESSENTIAL HYPERTENSION: ICD-10-CM

## 2025-08-07 DIAGNOSIS — E11.69 DYSLIPIDEMIA ASSOCIATED WITH TYPE 2 DIABETES MELLITUS (HCC): ICD-10-CM

## 2025-08-07 DIAGNOSIS — E78.5 DYSLIPIDEMIA ASSOCIATED WITH TYPE 2 DIABETES MELLITUS (HCC): ICD-10-CM

## 2025-08-07 DIAGNOSIS — Z00.00 HEALTHCARE MAINTENANCE: ICD-10-CM

## 2025-08-07 LAB
ALBUMIN SERPL BCP-MCNC: 4.2 G/DL (ref 3.2–4.9)
ALBUMIN/GLOB SERPL: 1.3 G/DL
ALP SERPL-CCNC: 54 U/L (ref 30–99)
ALT SERPL-CCNC: 19 U/L (ref 2–50)
ANION GAP SERPL CALC-SCNC: 11 MMOL/L (ref 7–16)
AST SERPL-CCNC: 25 U/L (ref 12–45)
BILIRUB SERPL-MCNC: 0.5 MG/DL (ref 0.1–1.5)
BUN SERPL-MCNC: 14 MG/DL (ref 8–22)
CALCIUM ALBUM COR SERPL-MCNC: 9.6 MG/DL (ref 8.5–10.5)
CALCIUM SERPL-MCNC: 9.8 MG/DL (ref 8.5–10.5)
CHLORIDE SERPL-SCNC: 103 MMOL/L (ref 96–112)
CHOLEST SERPL-MCNC: 157 MG/DL (ref 100–199)
CO2 SERPL-SCNC: 23 MMOL/L (ref 20–33)
CREAT SERPL-MCNC: 0.65 MG/DL (ref 0.5–1.4)
CREAT UR-MCNC: 64.8 MG/DL
EST. AVERAGE GLUCOSE BLD GHB EST-MCNC: 146 MG/DL
GFR SERPLBLD CREATININE-BSD FMLA CKD-EPI: 93 ML/MIN/1.73 M 2
GLOBULIN SER CALC-MCNC: 3.2 G/DL (ref 1.9–3.5)
GLUCOSE SERPL-MCNC: 96 MG/DL (ref 65–99)
HBA1C MFR BLD: 6.7 % (ref 4–5.6)
HDLC SERPL-MCNC: 57 MG/DL
LDLC SERPL CALC-MCNC: 64 MG/DL
MICROALBUMIN UR-MCNC: 6.1 MG/DL
MICROALBUMIN/CREAT UR: 94 MG/G (ref 0–30)
POTASSIUM SERPL-SCNC: 4.1 MMOL/L (ref 3.6–5.5)
PROT SERPL-MCNC: 7.4 G/DL (ref 6–8.2)
SODIUM SERPL-SCNC: 137 MMOL/L (ref 135–145)
TRIGL SERPL-MCNC: 179 MG/DL (ref 0–149)

## 2025-08-07 PROCEDURE — 83036 HEMOGLOBIN GLYCOSYLATED A1C: CPT | Mod: GA

## 2025-08-07 PROCEDURE — 82570 ASSAY OF URINE CREATININE: CPT

## 2025-08-07 PROCEDURE — 80053 COMPREHEN METABOLIC PANEL: CPT

## 2025-08-07 PROCEDURE — 36415 COLL VENOUS BLD VENIPUNCTURE: CPT

## 2025-08-07 PROCEDURE — 80061 LIPID PANEL: CPT

## 2025-08-07 PROCEDURE — 82043 UR ALBUMIN QUANTITATIVE: CPT

## 2025-08-07 RX ORDER — LOSARTAN POTASSIUM 50 MG/1
50 TABLET ORAL DAILY
Qty: 90 TABLET | Refills: 2 | Status: SHIPPED | OUTPATIENT
Start: 2025-08-07 | End: 2025-08-14 | Stop reason: SDUPTHER

## 2025-08-07 RX ORDER — ALLOPURINOL 300 MG/1
300 TABLET ORAL DAILY
Qty: 90 TABLET | Refills: 2 | Status: SHIPPED | OUTPATIENT
Start: 2025-08-07 | End: 2025-08-14 | Stop reason: SDUPTHER

## 2025-08-07 RX ORDER — METOPROLOL SUCCINATE 25 MG/1
25 TABLET, EXTENDED RELEASE ORAL DAILY
Qty: 90 TABLET | Refills: 2 | Status: SHIPPED | OUTPATIENT
Start: 2025-08-07 | End: 2025-08-14 | Stop reason: SDUPTHER

## 2025-08-14 ENCOUNTER — OFFICE VISIT (OUTPATIENT)
Dept: MEDICAL GROUP | Facility: PHYSICIAN GROUP | Age: 75
End: 2025-08-14
Payer: COMMERCIAL

## 2025-08-14 VITALS
DIASTOLIC BLOOD PRESSURE: 60 MMHG | OXYGEN SATURATION: 97 % | SYSTOLIC BLOOD PRESSURE: 110 MMHG | TEMPERATURE: 98.1 F | WEIGHT: 140.4 LBS | BODY MASS INDEX: 26.51 KG/M2 | HEIGHT: 61 IN | HEART RATE: 71 BPM

## 2025-08-14 DIAGNOSIS — Z13.29 SCREENING FOR THYROID DISORDER: Primary | ICD-10-CM

## 2025-08-14 DIAGNOSIS — I10 ESSENTIAL HYPERTENSION: ICD-10-CM

## 2025-08-14 DIAGNOSIS — E78.5 DYSLIPIDEMIA ASSOCIATED WITH TYPE 2 DIABETES MELLITUS (HCC): ICD-10-CM

## 2025-08-14 DIAGNOSIS — R80.9 TYPE 2 DIABETES MELLITUS WITH MICROALBUMINURIA, WITHOUT LONG-TERM CURRENT USE OF INSULIN (HCC): ICD-10-CM

## 2025-08-14 DIAGNOSIS — E11.69 DYSLIPIDEMIA ASSOCIATED WITH TYPE 2 DIABETES MELLITUS (HCC): ICD-10-CM

## 2025-08-14 DIAGNOSIS — E11.65 TYPE 2 DIABETES MELLITUS WITH HYPERGLYCEMIA, WITHOUT LONG-TERM CURRENT USE OF INSULIN (HCC): ICD-10-CM

## 2025-08-14 DIAGNOSIS — R05.1 ACUTE COUGH: ICD-10-CM

## 2025-08-14 DIAGNOSIS — M10.9 GOUT, ARTHRITIS: ICD-10-CM

## 2025-08-14 DIAGNOSIS — E11.29 TYPE 2 DIABETES MELLITUS WITH MICROALBUMINURIA, WITHOUT LONG-TERM CURRENT USE OF INSULIN (HCC): ICD-10-CM

## 2025-08-14 DIAGNOSIS — R06.2 WHEEZING: ICD-10-CM

## 2025-08-14 PROCEDURE — 3074F SYST BP LT 130 MM HG: CPT

## 2025-08-14 PROCEDURE — 3078F DIAST BP <80 MM HG: CPT

## 2025-08-14 PROCEDURE — 99214 OFFICE O/P EST MOD 30 MIN: CPT

## 2025-08-14 RX ORDER — METOPROLOL SUCCINATE 25 MG/1
25 TABLET, EXTENDED RELEASE ORAL DAILY
Qty: 90 TABLET | Refills: 3 | Status: SHIPPED | OUTPATIENT
Start: 2025-08-14

## 2025-08-14 RX ORDER — ALLOPURINOL 300 MG/1
300 TABLET ORAL DAILY
Qty: 90 TABLET | Refills: 3 | Status: SHIPPED | OUTPATIENT
Start: 2025-08-14

## 2025-08-14 RX ORDER — LOSARTAN POTASSIUM 50 MG/1
50 TABLET ORAL DAILY
Qty: 90 TABLET | Refills: 3 | Status: SHIPPED | OUTPATIENT
Start: 2025-08-14

## 2025-08-14 RX ORDER — DORZOLAMIDE HYDROCHLORIDE AND TIMOLOL MALEATE 20; 5 MG/ML; MG/ML
1 SOLUTION/ DROPS OPHTHALMIC 2 TIMES DAILY
Qty: 9 ML | Refills: 3 | Status: CANCELLED | OUTPATIENT
Start: 2025-08-14

## 2025-08-14 RX ORDER — ALBUTEROL SULFATE 90 UG/1
2 INHALANT RESPIRATORY (INHALATION) EVERY 4 HOURS PRN
Qty: 1 EACH | Refills: 11 | Status: SHIPPED | OUTPATIENT
Start: 2025-08-14

## 2025-08-14 RX ORDER — ROSUVASTATIN CALCIUM 20 MG/1
20 TABLET, COATED ORAL EVERY EVENING
Qty: 90 TABLET | Refills: 3 | Status: SHIPPED | OUTPATIENT
Start: 2025-08-14

## 2025-08-14 ASSESSMENT — ENCOUNTER SYMPTOMS
ABDOMINAL PAIN: 0
CONSTIPATION: 0
WEAKNESS: 0
NAUSEA: 0
COUGH: 0
BLURRED VISION: 0
MYALGIAS: 0
VOMITING: 0
DIARRHEA: 0
FEVER: 0
HEADACHES: 0
SHORTNESS OF BREATH: 0
DIZZINESS: 0
WEIGHT LOSS: 0
CHILLS: 0

## 2025-08-14 ASSESSMENT — FIBROSIS 4 INDEX: FIB4 SCORE: 2.11
